# Patient Record
Sex: MALE | Race: WHITE | Employment: OTHER | URBAN - NONMETROPOLITAN AREA
[De-identification: names, ages, dates, MRNs, and addresses within clinical notes are randomized per-mention and may not be internally consistent; named-entity substitution may affect disease eponyms.]

---

## 2017-04-03 ENCOUNTER — OFFICE VISIT - GICH (OUTPATIENT)
Dept: FAMILY MEDICINE | Facility: OTHER | Age: 67
End: 2017-04-03

## 2017-04-03 ENCOUNTER — HISTORY (OUTPATIENT)
Dept: FAMILY MEDICINE | Facility: OTHER | Age: 67
End: 2017-04-03

## 2017-04-03 DIAGNOSIS — E78.2 MIXED HYPERLIPIDEMIA: ICD-10-CM

## 2017-04-03 DIAGNOSIS — Z00.00 ENCOUNTER FOR GENERAL ADULT MEDICAL EXAMINATION WITHOUT ABNORMAL FINDINGS: ICD-10-CM

## 2017-04-03 DIAGNOSIS — Z86.0100 HISTORY OF COLONIC POLYPS: ICD-10-CM

## 2017-04-03 DIAGNOSIS — I10 ESSENTIAL (PRIMARY) HYPERTENSION: ICD-10-CM

## 2017-04-03 LAB
A/G RATIO - HISTORICAL: 1.5 (ref 1–2)
ALBUMIN SERPL-MCNC: 4.5 G/DL (ref 3.5–5.7)
ALP SERPL-CCNC: 71 IU/L (ref 34–104)
ALT (SGPT) - HISTORICAL: 18 IU/L (ref 7–52)
ANION GAP - HISTORICAL: 9 (ref 5–18)
AST SERPL-CCNC: 15 IU/L (ref 13–39)
BACTERIA URINE: NORMAL BACTERIA/HPF
BILIRUB SERPL-MCNC: 0.6 MG/DL (ref 0.3–1)
BILIRUB UR QL: NEGATIVE
BUN SERPL-MCNC: 11 MG/DL (ref 7–25)
BUN/CREAT RATIO - HISTORICAL: 13
CALCIUM SERPL-MCNC: 9.9 MG/DL (ref 8.6–10.3)
CHLORIDE SERPLBLD-SCNC: 98 MMOL/L (ref 98–107)
CHOL/HDL RATIO - HISTORICAL: 5.86
CHOLESTEROL TOTAL: 217 MG/DL
CLARITY, URINE: CLEAR CLARITY
CO2 SERPL-SCNC: 28 MMOL/L (ref 21–31)
COLOR UR: YELLOW COLOR
CREAT SERPL-MCNC: 0.84 MG/DL (ref 0.7–1.3)
EPITHELIAL CELLS: NORMAL EPI/HPF
GFR IF NOT AFRICAN AMERICAN - HISTORICAL: >60 ML/MIN/1.73M2
GLOBULIN - HISTORICAL: 3.1 G/DL (ref 2–3.7)
GLUCOSE SERPL-MCNC: 95 MG/DL (ref 70–105)
GLUCOSE URINE: NEGATIVE MG/DL
HDLC SERPL-MCNC: 37 MG/DL (ref 23–92)
HEMOGLOBIN: 16.1 G/DL (ref 13.5–17.5)
KETONES UR QL: NEGATIVE MG/DL
LDLC SERPL CALC-MCNC: 135 MG/DL
LEUKOCYTE ESTERASE URINE: NEGATIVE
MCV RBC AUTO: 95 FL (ref 80–100)
NITRITE UR QL STRIP: NEGATIVE
NON-HDL CHOLESTEROL - HISTORICAL: 180 MG/DL
OCCULT BLOOD,URINE - HISTORICAL: ABNORMAL
PATIENT STATUS - HISTORICAL: ABNORMAL
PH UR: 5.5 [PH]
POTASSIUM SERPL-SCNC: 3.8 MMOL/L (ref 3.5–5.1)
PROT SERPL-MCNC: 7.6 G/DL (ref 6.4–8.9)
PROTEIN QUALITATIVE,URINE - HISTORICAL: NEGATIVE MG/DL
RBC - HISTORICAL: NORMAL /HPF
SODIUM SERPL-SCNC: 135 MMOL/L (ref 133–143)
SP GR UR STRIP: 1.01
TRIGL SERPL-MCNC: 223 MG/DL
UROBILINOGEN,QUALITATIVE - HISTORICAL: NORMAL EU/DL
WBC - HISTORICAL: NORMAL /HPF

## 2017-04-06 ENCOUNTER — AMBULATORY - GICH (OUTPATIENT)
Dept: FAMILY MEDICINE | Facility: OTHER | Age: 67
End: 2017-04-06

## 2018-01-04 NOTE — PROGRESS NOTES
Patient Information     Patient Name MRN Sex Nina Bone [id number] Male 1950      Progress Notes signed by Nitza Pisano MD at 2017  2:46 PM      Author:  Nitza Pisano MD Service:  (none) Author Type:  Physician     Filed:  2017  2:46 PM Encounter Date:  2017 Status:  Signed     :  Nitza Pisano MD (Physician)            -  2017        [place] [address]      RE:  NINA LABOY  MR#:  [id number]  :  1950    Dear Mr. Laboy:    The results of your lab work are enclosed.     As you can see, your blood profile is entirely normal.     Your cholesterol has gone up a bit since last year when it was 190. This is still acceptable, although it would be a good idea to work on diet and exercise.     We checked your hemoglobin and your urine, both of which were normal.     I would recommend trying to cut back a bit on cholesterol foods, such as eggs and red meat.     I would also encourage you to try to work on quitting smoking, and cut back a bit on the alcohol use.     Overall, this lab work looks just fine, and we should check it again in one year.     Sincerely,      NITZA PISANO MD    WR/hml  Voice Job ID: [id number]  Text Job ID:  [id number]    ENC A copy of comprehensive metabolic panel and lipid panel done 2017.     cc:  2017  RE:  NINA LABOY  MR#:  [id number]  Page 1

## 2018-01-04 NOTE — NURSING NOTE
Patient Information     Patient Name MRN Babar Sloan [id number] Male 1950      Nursing Note by Fany Pelletier at 4/3/2017  2:45 PM     Author:  Fany Pelletier Service:  (none) Author Type:  (none)     Filed:  4/3/2017  3:40 PM Encounter Date:  4/3/2017 Status:  Signed     :  Fany Pelletier            Patient presents to the clinic today for a px.  Fayn Pelletier ....................  4/3/2017   2:51 PM

## 2018-01-04 NOTE — PROGRESS NOTES
Patient Information     Patient Name MRN Babar Sloan [id number] Male 1950      Progress Notes by Chuck Howard MD at 4/3/2017  2:45 PM     Author:  Chuck Howard MD Service:  (none) Author Type:  Physician     Filed:  4/3/2017  5:08 PM Encounter Date:  4/3/2017 Status:  Signed     :  Chuck Howard MD (Physician)            HPI-In today for comprehensive evaluation. He's been feeling fine, and states that he's had a fairly good year. His medication remains the same. He's had no particular problems.    He continues to smoke at least a half pack per day sometimes a whole pack per day. He drinks on call on a daily basis, 4-5 beers. His wife had inform me that she felt that this is becoming a problem. He denies any particular problems.    He otherwise feels well.   Past Medical History:     Diagnosis  Date     ACNE ROSACEA 2011     External hemorrhoids     noted on colonoscopy      Healthcare maintenance      Heavy alcohol use     4-5 beers/day      HYPERTENSION      INGUINAL HERNIA, RIGHT 2011     Microscopic hematuria      Tobacco use      Past Surgical History:      Procedure  Laterality Date     Bilat cataracts       CARPAL TUNNEL RELEASE Right     right       Closed reduction of right forearm fracture       COLONOSCOPY DIAGNOSTIC  16    F/U        COLONOSCOPY SCREENING      F/U        HERNIA REPAIR Right 13    [facility] with mesh       Family History       Problem   Relation Age of Onset     Hypertension  Other      Multiple family members       Hypertension  Father      Heart Disease  Father      Social History        Substance Use Topics          Smoking status:   Current Every Day Smoker      Packs/day:  0.50      Years:  42.00      Types:  Cigarettes      Smokeless tobacco:   Never Used      Alcohol use   Yes      Comment: About 6 beers daily, more on the weekend.       No Known Allergies    Current Outpatient Prescriptions:       aspirin 81 mg tablet, Take 81 mg by mouth once daily., Disp: , Rfl:      atenolol (TENORMIN) 25 mg tablet, Take 1 tablet by mouth once daily., Disp: 90 tablet, Rfl: 4     hydroCHLOROthiazide (HCTZ) 25 mg tablet, Take 1 tablet by mouth once daily., Disp: 90 tablet, Rfl: 4  Medications have been reviewed by me and are current to the best of my knowledge and ability.      ROS:  See HPI:  General denies any recent weight change, fever, chills, or night sweats. Eyes, ENT, cardiopulmonary, GI, , rheumatologic, hematologic, skin, lymph, neurologic, psychiatric and endocrine are all negative.    Physical exam-he is a pleasant, cooperative,  66 y.o. male   in no apparent distress.  HEENT-within normal limits for age.  Neck-supple with no adenopathy, thyromegaly, or bruits.  Good carotid pulses.  Lungs-clear with good air movement.  No rales, rhonchi, or wheezes are heard  Heart-regular rhythm with no murmur or gallop appreciated.  Abdomen-soft and nontender with no organomegaly or masses, bowel sounds are normal.  No bruits are heard.  Back-no CVA or low back tenderness.  -normal penis and testicles.  No hernia.  No lesions are seen.  Rectal-normal sphincter tone. Empty rectal vault. Normal size prostate with no nodules, masses, or asymmetry and no tenderness.  Extremities-no cyanosis, clubbing, or edema.  Normal distal pulses.  Skin-no significant lesions or rashes are noted. He does have rosacea of the face which is fairly stable  Lymphatic-no abnormal nodes are noted   Neurologic-intact and nonfocal.  Psychiatric-alert and oriented.  Normal mood and affect.    Lab-pending  Imaging-none indicated    Assessment-preventive healthcare-normal exam. Immunizations and colonoscopy are both up-to-date.    Persistent smoking    Fairly heavy alcohol use-patient denies but wife states that this is a problem. Of note is that his wife, who was seen a few days ago, asked me not to say that she had said anything.    Hypertension with  good control    History of colon polyp    Mild hyperlipidemia    Plan-encouraged him to work on cutting down or quitting smoking along with alcohol use which is entirely beer drinking. Medications refilled for another year. Laboratory studies done, he'll be notified of the results. Colonoscopy will be due in 3 years. Follow-up at yearly intervals.    Chuck Howard MD ....................  4/3/2017   5:00 PM  HPI-In today for comprehensive evaluation.    Past Medical History:     Diagnosis  Date     ACNE ROSACEA 12/1/2011     External hemorrhoids 12/05    noted on colonoscopy      Healthcare maintenance      Heavy alcohol use     4-5 beers/day      HYPERTENSION      INGUINAL HERNIA, RIGHT 12/1/2011     Microscopic hematuria      Tobacco use      Past Surgical History:      Procedure  Laterality Date     Bilat cataracts       CARPAL TUNNEL RELEASE Right 2003    right       Closed reduction of right forearm fracture       COLONOSCOPY DIAGNOSTIC  4/25/16    F/U 2021       COLONOSCOPY SCREENING  12/05    F/U 2015       HERNIA REPAIR Right 1/29/13    [facility] with mesh       Family History       Problem   Relation Age of Onset     Hypertension  Other      Multiple family members       Hypertension  Father      Heart Disease  Father      Social History        Substance Use Topics          Smoking status:   Current Every Day Smoker      Packs/day:  0.50      Years:  42.00      Types:  Cigarettes      Smokeless tobacco:   Never Used      Alcohol use   Yes      Comment: About 6 beers daily, more on the weekend.

## 2018-01-27 VITALS
DIASTOLIC BLOOD PRESSURE: 86 MMHG | HEIGHT: 69 IN | BODY MASS INDEX: 27.25 KG/M2 | SYSTOLIC BLOOD PRESSURE: 136 MMHG | WEIGHT: 184 LBS | HEART RATE: 60 BPM

## 2018-02-24 ENCOUNTER — DOCUMENTATION ONLY (OUTPATIENT)
Dept: FAMILY MEDICINE | Facility: OTHER | Age: 68
End: 2018-02-24

## 2018-02-24 PROBLEM — I10 HYPERTENSION: Status: ACTIVE | Noted: 2018-02-24

## 2018-02-24 RX ORDER — ATENOLOL 25 MG/1
25 TABLET ORAL DAILY
COMMUNITY
Start: 2017-04-03 | End: 2018-04-16

## 2018-02-24 RX ORDER — HYDROCHLOROTHIAZIDE 25 MG/1
25 TABLET ORAL DAILY
COMMUNITY
Start: 2017-04-03 | End: 2018-05-15

## 2018-04-16 DIAGNOSIS — I10 BENIGN ESSENTIAL HYPERTENSION: Primary | ICD-10-CM

## 2018-04-20 RX ORDER — ATENOLOL 25 MG/1
TABLET ORAL
Qty: 90 TABLET | Refills: 0 | Status: SHIPPED | OUTPATIENT
Start: 2018-04-20 | End: 2018-05-15

## 2018-04-20 NOTE — TELEPHONE ENCOUNTER
[facility] pharmacy and pt request a Rx refill for atenolol.  Beta Blockers protocol failed due to no blood pressure under 140/90 in past 12 months and no recent (12 mo) or future (30 days) visit within the authorizing provider's speciality.  Pt's last exam with PCP Dr. CHAKA Howard was on 4-3-17, yet has a scheduled appt with PCP on 5-15-18.  Will route to PCP for review and consideration of refill.  Unable to complete prescription refill per RN Medication Refill Policy.................... Cristiane Arvizu ....................  4/20/2018   10:30 AM

## 2018-05-15 ENCOUNTER — OFFICE VISIT (OUTPATIENT)
Dept: FAMILY MEDICINE | Facility: OTHER | Age: 68
End: 2018-05-15
Attending: FAMILY MEDICINE
Payer: MEDICARE

## 2018-05-15 ENCOUNTER — HOSPITAL ENCOUNTER (OUTPATIENT)
Dept: GENERAL RADIOLOGY | Facility: OTHER | Age: 68
Discharge: HOME | End: 2018-05-15
Attending: FAMILY MEDICINE | Admitting: FAMILY MEDICINE
Payer: MEDICARE

## 2018-05-15 VITALS
HEART RATE: 76 BPM | HEIGHT: 69 IN | WEIGHT: 177.4 LBS | DIASTOLIC BLOOD PRESSURE: 98 MMHG | BODY MASS INDEX: 26.28 KG/M2 | SYSTOLIC BLOOD PRESSURE: 136 MMHG

## 2018-05-15 DIAGNOSIS — G89.29 CHRONIC PAIN OF LEFT KNEE: ICD-10-CM

## 2018-05-15 DIAGNOSIS — M25.562 CHRONIC PAIN OF LEFT KNEE: ICD-10-CM

## 2018-05-15 DIAGNOSIS — Z13.6 ENCOUNTER FOR ABDOMINAL AORTIC ANEURYSM (AAA) SCREENING: ICD-10-CM

## 2018-05-15 DIAGNOSIS — I10 BENIGN ESSENTIAL HYPERTENSION: Primary | ICD-10-CM

## 2018-05-15 LAB
ALBUMIN SERPL-MCNC: 4.8 G/DL (ref 3.5–5.7)
ALBUMIN UR-MCNC: NEGATIVE MG/DL
ALP SERPL-CCNC: 81 U/L (ref 34–104)
ALT SERPL W P-5'-P-CCNC: 22 U/L (ref 7–52)
ANION GAP SERPL CALCULATED.3IONS-SCNC: 9 MMOL/L (ref 3–14)
APPEARANCE UR: CLEAR
AST SERPL W P-5'-P-CCNC: 19 U/L (ref 13–39)
BASOPHILS # BLD AUTO: 0.1 10E9/L (ref 0–0.2)
BASOPHILS NFR BLD AUTO: 0.9 %
BILIRUB SERPL-MCNC: 0.6 MG/DL (ref 0.3–1)
BILIRUB UR QL STRIP: NEGATIVE
BUN SERPL-MCNC: 18 MG/DL (ref 7–25)
CALCIUM SERPL-MCNC: 10.3 MG/DL (ref 8.6–10.3)
CHLORIDE SERPL-SCNC: 99 MMOL/L (ref 98–107)
CHOLEST SERPL-MCNC: 193 MG/DL
CO2 SERPL-SCNC: 27 MMOL/L (ref 21–31)
COLOR UR AUTO: YELLOW
CREAT SERPL-MCNC: 0.86 MG/DL (ref 0.7–1.3)
DIFFERENTIAL METHOD BLD: NORMAL
EOSINOPHIL # BLD AUTO: 0.2 10E9/L (ref 0–0.7)
EOSINOPHIL NFR BLD AUTO: 2.5 %
ERYTHROCYTE [DISTWIDTH] IN BLOOD BY AUTOMATED COUNT: 13.1 % (ref 10–15)
GFR SERPL CREATININE-BSD FRML MDRD: 89 ML/MIN/1.7M2
GLUCOSE SERPL-MCNC: 95 MG/DL (ref 70–105)
GLUCOSE UR STRIP-MCNC: NEGATIVE MG/DL
HCT VFR BLD AUTO: 43.7 % (ref 40–53)
HDLC SERPL-MCNC: 36 MG/DL (ref 23–92)
HGB BLD-MCNC: 15.5 G/DL (ref 13.3–17.7)
HGB UR QL STRIP: ABNORMAL
IMM GRANULOCYTES # BLD: 0 10E9/L (ref 0–0.4)
IMM GRANULOCYTES NFR BLD: 0.6 %
KETONES UR STRIP-MCNC: NEGATIVE MG/DL
LDLC SERPL CALC-MCNC: 120 MG/DL
LEUKOCYTE ESTERASE UR QL STRIP: NEGATIVE
LYMPHOCYTES # BLD AUTO: 2.2 10E9/L (ref 0.8–5.3)
LYMPHOCYTES NFR BLD AUTO: 32.2 %
MCH RBC QN AUTO: 31.8 PG (ref 26.5–33)
MCHC RBC AUTO-ENTMCNC: 35.5 G/DL (ref 31.5–36.5)
MCV RBC AUTO: 90 FL (ref 78–100)
MONOCYTES # BLD AUTO: 0.7 10E9/L (ref 0–1.3)
MONOCYTES NFR BLD AUTO: 10.1 %
NEUTROPHILS # BLD AUTO: 3.6 10E9/L (ref 1.6–8.3)
NEUTROPHILS NFR BLD AUTO: 53.7 %
NITRATE UR QL: NEGATIVE
NONHDLC SERPL-MCNC: 157 MG/DL
PH UR STRIP: 5 PH (ref 5–7)
PLATELET # BLD AUTO: 301 10E9/L (ref 150–450)
POTASSIUM SERPL-SCNC: 4.2 MMOL/L (ref 3.5–5.1)
PROT SERPL-MCNC: 7.9 G/DL (ref 6.4–8.9)
RBC # BLD AUTO: 4.88 10E12/L (ref 4.4–5.9)
RBC #/AREA URNS AUTO: NORMAL /HPF
SODIUM SERPL-SCNC: 135 MMOL/L (ref 134–144)
SOURCE: ABNORMAL
SP GR UR STRIP: 1.02 (ref 1–1.03)
TRIGL SERPL-MCNC: 187 MG/DL
UROBILINOGEN UR STRIP-ACNC: 0.2 EU/DL (ref 0.2–1)
WBC # BLD AUTO: 6.7 10E9/L (ref 4–11)
WBC #/AREA URNS AUTO: NORMAL /HPF

## 2018-05-15 PROCEDURE — 85025 COMPLETE CBC W/AUTO DIFF WBC: CPT | Performed by: FAMILY MEDICINE

## 2018-05-15 PROCEDURE — G0463 HOSPITAL OUTPT CLINIC VISIT: HCPCS

## 2018-05-15 PROCEDURE — 99214 OFFICE O/P EST MOD 30 MIN: CPT | Performed by: FAMILY MEDICINE

## 2018-05-15 PROCEDURE — 81001 URINALYSIS AUTO W/SCOPE: CPT | Performed by: FAMILY MEDICINE

## 2018-05-15 PROCEDURE — 80053 COMPREHEN METABOLIC PANEL: CPT | Performed by: FAMILY MEDICINE

## 2018-05-15 PROCEDURE — 36415 COLL VENOUS BLD VENIPUNCTURE: CPT | Performed by: FAMILY MEDICINE

## 2018-05-15 PROCEDURE — 80061 LIPID PANEL: CPT | Performed by: FAMILY MEDICINE

## 2018-05-15 RX ORDER — ATENOLOL 50 MG/1
50 TABLET ORAL DAILY
Qty: 90 TABLET | Refills: 3 | Status: SHIPPED | OUTPATIENT
Start: 2018-05-15 | End: 2018-09-06

## 2018-05-15 RX ORDER — HYDROCHLOROTHIAZIDE 25 MG/1
25 TABLET ORAL DAILY
Qty: 90 TABLET | Refills: 3 | Status: SHIPPED | OUTPATIENT
Start: 2018-05-15 | End: 2019-06-17

## 2018-05-15 ASSESSMENT — PAIN SCALES - GENERAL: PAINLEVEL: MODERATE PAIN (4)

## 2018-05-15 NOTE — PROGRESS NOTES
HPI-In today for comprehensive evaluation.  Overall, he is been feeling fine.  The only thing is bothering him is his left knee.  He is noted for several months, in fact perhaps over a year, that he has had pain in the left knee fairly diffusely over the anterior aspect.  There is been no injury at all recently and he does not recall any remote injury.  The pain stays localized in the area of the knee without radiation.  He is been taking just 1 200 mg ibuprofen daily and this seems to help and he is noted that since the weather got nicer out the pain is improved.  It bothers him mostly with walking and not at rest.  Otherwise he is feeling fine with no new problems or concerns.    He continues to smoke.  He is been a smoker since high school and has roughly a 35-pack-year history.  He is down to about a half pack per day now.    Medication remains the same.    Past Medical History:   Diagnosis Date     Encounter for general adult medical examination without abnormal findings     No Comments Provided     Essential (primary) hypertension     No Comments Provided     Other microscopic hematuria     No Comments Provided     Other problems related to lifestyle     4-5 beers/day     Residual hemorrhoidal skin tags     12/05,noted on colonoscopy     Rosacea     12/1/2011     Tobacco use     No Comments Provided     Unilateral inguinal hernia without obstruction or gangrene     12/1/2011     Past Surgical History:   Procedure Laterality Date     COLONOSCOPY      12/05,F/U 2015     COLONOSCOPY      4/25/16,F/U 2021     OTHER SURGICAL HISTORY      [id number],OTHER     OTHER SURGICAL HISTORY      [id number],OTHER     OTHER SURGICAL HISTORY      1/29/13,,HERNIA REPAIR,Right,[facility] with mesh     RELEASE CARPAL TUNNEL      2003,right     Family History   Problem Relation Age of Onset     Hypertension Father      Hypertension     HEART DISEASE Father      Heart Disease     Hypertension Other      Hypertension,Multiple family members      Social History   Substance Use Topics     Smoking status: Current Every Day Smoker     Packs/day: 0.50     Years: 42.00     Types: Cigarettes     Smokeless tobacco: Never Used     Alcohol use Yes      Comment: Alcoholic Drinks/day: About 6 beers daily, more on the weekend.     No Known Allergies    Current Outpatient Prescriptions:      aspirin 81 MG tablet, Take 81 mg by mouth daily, Disp: , Rfl:      atenolol (TENORMIN) 50 MG tablet, Take 1 tablet (50 mg) by mouth daily Dose increase, Disp: 90 tablet, Rfl: 3     hydrochlorothiazide (HYDRODIURIL) 25 MG tablet, Take 1 tablet (25 mg) by mouth daily, Disp: 90 tablet, Rfl: 3     [DISCONTINUED] atenolol (TENORMIN) 25 MG tablet, TAKE 1 TABLET BY MOUTH ONCE DAILY, Disp: 90 tablet, Rfl: 0     [DISCONTINUED] hydrochlorothiazide (HYDRODIURIL) 25 MG tablet, Take 25 mg by mouth daily, Disp: , Rfl:     ROS:  See HPI: No recent weight change, fever, chills, night sweats.  Eyes, ENT, cardiopulmonary, GI, , rheumatologic, hematologic, skin, lymph, neurologic, psychiatric and endocrine are all negative other than was noted in the HPI and his rosacea which he says is quite stable.    Physical exam-he is a pleasant, cooperative,  67 year old male   in noapparent distress.  HEENT-within normal limits for age.  He has diffuse rosacea changes on the face  Neck-supple with no adenopathy, thyromegaly, or bruits.  Good carotid pulses.  Lungs-clear with good air movement.  No rales, rhonchi, or wheezes are heard  Heart-regular rhythm with no murmur or gallop appreciated.  Abdomen-soft and nontender with no organomegaly or masses, bowel sounds are normal.  No bruits are heard.  Back-no CVA or low back tenderness.  -normal penis and testicles.  No hernia.  No lesions are seen.  Rectal-normal sphincter tone.  There are few small external hemorrhoids that are not inflamed.  Rectal vault is empty.  Prostate is normal size with no nodules, tenderness, or asymmetry.  Extremities-no  cyanosis, clubbing, or edema.  Normal distal pulses.  Skin-no significant lesions or rashes are noted.  Lymphatic-noabnormal nodes are noted   Neurologic-intact and nonfocal.  Psychiatric-alert and oriented.  Normal mood and affect.    Lab-pending  Imaging-the x-rays were done and personally reviewed by me and reviewed with the patient.  He does have some very mild medial joint line narrowing, but no real specific findings otherwise.    Assessment-preventive healthcare-normal exam.  Immunizations are all up-to-date.  Colonoscopy is due in 2021 for follow-up of a tubular adenoma.  With his smoking history screening aortic ultrasound is recommended and will be scheduled.    Hypertension with borderline adequate control.  Repeat blood pressure by me was the same.  His pulse rate is up over 70s I recommended increasing atenolol to 50 mg daily, continue same hydrochlorothiazide, and follow-up for blood pressure recheck in 2-3 months.  Laboratory studies will be done and he will be notified of the results.    Knee pain-this is improving and appears to be likely related to tendinitis.  Suggested he just continue with exercising, use heat and ice when needed, ibuprofen, and reevaluate if symptoms worsen.    Plan-med changes as noted above.  Ultrasound scheduled.  Otherwise if all goes well follow-up in 2-3 months for blood pressure and then at yearly intervals for a general exam.    NITZA PISANO....................  5/15/2018   2:03 PM

## 2018-05-15 NOTE — MR AVS SNAPSHOT
"              After Visit Summary   5/15/2018    Babar Laboy    MRN: [id number]           Patient Information     Date Of Birth          1950        Visit Information        Provider Department      5/15/2018 12:45 PM Chuck Howard MD [facility]        Today's Diagnoses     Benign essential hypertension    -  1    Chronic pain of left knee        Encounter for abdominal aortic aneurysm (AAA) screening           Follow-ups after your visit        Future tests that were ordered for you today     Open Future Orders        Priority Expected Expires Ordered    US Aorta Medicare AAA Screening Routine  5/15/2019 5/15/2018    XR Knee Standing 2v  Bilateral & 2v Left Routine 5/15/2018 5/15/2019 5/15/2018            Who to contact     If you have questions or need follow up information about today's clinic visit or your schedule please contact [facility] directly at [phone].  Normal or non-critical lab and imaging results will be communicated to you by [employer], letter or phone within 4 business days after the clinic has received the results. If you do not hear from us within 7 days, please contact the clinic through [employer] or phone. If you have a critical or abnormal lab result, we will notify you by phone as soon as possible.  Submit refill requests through [employer] or call your pharmacy and they will forward the refill request to us. Please allow 3 business days for your refill to be completed.          Additional Information About Your Visit        MyChart Information     [employer] lets you send messages to your doctor, view your test results, renew your prescriptions, schedule appointments and more. To sign up, go to [email or web address] . Click on \"Log in\" on the left side of the screen, which will take you to the Welcome page. Then click on \"Sign up Now\" on the right side of the page.     You will be asked to enter the access code listed below, as well " "as some personal information. Please follow the directions to create your username and password.     Your access code is: [email or web address]  Expires: 2018  2:09 PM     Your access code will  in 90 days. If you need help or a new code, please call your [place] clinic or [phone].        Care EveryWhere ID     This is your Care EveryWhere ID. This could be used by other organizations to access your [place] medical records  [id number]        Your Vitals Were     Pulse Height BMI (Body Mass Index)             76 5' 9\" (1.753 m) 26.2 kg/m2          Blood Pressure from Last 3 Encounters:   05/15/18 (!) 136/98   17 136/86   16 130/82    Weight from Last 3 Encounters:   05/15/18 177 lb 6.4 oz (80.5 kg)   17 184 lb (83.5 kg)   16 185 lb 12.8 oz (84.3 kg)              We Performed the Following     *UA reflex to Microscopic     CBC with platelets differential     Comprehensive metabolic panel     Lipid Profile          Today's Medication Changes          These changes are accurate as of 5/15/18  2:09 PM.  If you have any questions, ask your nurse or doctor.               These medicines have changed or have updated prescriptions.        Dose/Directions    atenolol 50 MG tablet   Commonly known as:  TENORMIN   This may have changed:  See the new instructions.   Used for:  Benign essential hypertension   Changed by:  Chuck Howard MD        Dose:  50 mg   Take 1 tablet (50 mg) by mouth daily Dose increase   Quantity:  90 tablet   Refills:  3            Where to get your medicines      These medications were sent to [facility] - [place], [place] - [address]  [address], [facility] [address]     Phone:  [phone]     atenolol 50 MG tablet    hydrochlorothiazide 25 MG tablet                Primary Care Provider Office Phone # Fax #    Chuck Howard [phone]       [address] [facility] [address]      "   Equal Access to Services     [facility] : Hadii aad ku hadsilviojosefina Kelseykhoa, wabernardinoda luqadaha, qaybta jesigraciethompson mccall. So [facility] [phone].    ATENCIÓN: Si habla español, tiene a pena disposición servicios gratuitos de asistencia lingüística. Llame al [phone].    We comply with applicable federal civil rights laws and [place] laws. We do not discriminate on the basis of race, color, national origin, age, disability, sex, sexual orientation, or gender identity.            Thank you!     Thank you for choosing [facility]  for your care. Our goal is always to provide you with excellent care. Hearing back from our patients is one way we can continue to improve our services. Please take a few minutes to complete the written survey that you may receive in the mail after your visit with us. Thank you!             Your Updated Medication List - Protect others around you: Learn how to safely use, store and throw away your medicines at www.disposemymeds.org.          This list is accurate as of 5/15/18  2:09 PM.  Always use your most recent med list.                   Brand Name Dispense Instructions for use Diagnosis    aspirin 81 MG tablet      Take 81 mg by mouth daily        atenolol 50 MG tablet    TENORMIN    90 tablet    Take 1 tablet (50 mg) by mouth daily Dose increase    Benign essential hypertension       hydrochlorothiazide 25 MG tablet    HYDRODIURIL    90 tablet    Take 1 tablet (25 mg) by mouth daily    Benign essential hypertension

## 2018-05-15 NOTE — LETTER
May 15, 2018      Babar Laboy  [address]   [facility] [address]        Dear ,    I am writing to inform you of your test results.    Your lab work all looks just fine.  It is essentially normal.  Your cholesterol has come down nicely from last year, and the slightly elevated triglycerides are not a problem.    Lab work should just be repeated again in 1 year.    As we discussed, we should see her back in August or September for blood pressure recheck.    Resulted Orders   Comprehensive metabolic panel   Result Value Ref Range    Sodium 135 134 - 144 mmol/L    Potassium 4.2 3.5 - 5.1 mmol/L    Chloride 99 98 - 107 mmol/L    Carbon Dioxide 27 21 - 31 mmol/L    Anion Gap 9 3 - 14 mmol/L    Glucose 95 70 - 105 mg/dL    Urea Nitrogen 18 7 - 25 mg/dL    Creatinine 0.86 0.70 - 1.30 mg/dL    GFR Estimate 89 >60 mL/min/1.7m2    GFR Estimate If Black >90 >60 mL/min/1.7m2    Calcium 10.3 8.6 - 10.3 mg/dL    Bilirubin Total 0.6 0.3 - 1.0 mg/dL    Albumin 4.8 3.5 - 5.7 g/dL    Protein Total 7.9 6.4 - 8.9 g/dL    Alkaline Phosphatase 81 34 - 104 U/L    ALT 22 7 - 52 U/L    AST 19 13 - 39 U/L   CBC with platelets differential   Result Value Ref Range    WBC 6.7 4.0 - 11.0 10e9/L    RBC Count 4.88 4.4 - 5.9 10e12/L    Hemoglobin 15.5 13.3 - 17.7 g/dL    Hematocrit 43.7 40.0 - 53.0 %    MCV 90 78 - 100 fl    MCH 31.8 26.5 - 33.0 pg    MCHC 35.5 31.5 - 36.5 g/dL    RDW 13.1 10.0 - 15.0 %    Platelet Count 301 150 - 450 10e9/L    Diff Method Automated Method     % Neutrophils 53.7 %    % Lymphocytes 32.2 %    % Monocytes 10.1 %    % Eosinophils 2.5 %    % Basophils 0.9 %    % Immature Granulocytes 0.6 %    Absolute Neutrophil 3.6 1.6 - 8.3 10e9/L    Absolute Lymphocytes 2.2 0.8 - 5.3 10e9/L    Absolute Monocytes 0.7 0.0 - 1.3 10e9/L    Absolute Eosinophils 0.2 0.0 - 0.7 10e9/L    Absolute Basophils 0.1 0.0 - 0.2 10e9/L    Abs Immature Granulocytes 0.0 0 - 0.4 10e9/L   Lipid Profile   Result Value Ref Range    Cholesterol  193 <200 mg/dL    Triglycerides 187 (H) <150 mg/dL      Comment:      Borderline high:  150-199 mg/dl  High:             200-499 mg/dl  Very high:       >499 mg/dl      HDL Cholesterol 36 23 - 92 mg/dL    LDL Cholesterol Calculated 120 (H) <100 mg/dL      Comment:      Above desirable:  100-129 mg/dl  Borderline High:  130-159 mg/dL  High:             160-189 mg/dL  Very high:       >189 mg/dl      Non HDL Cholesterol 157 (H) <130 mg/dL      Comment:      Above Desirable:  130-159 mg/dl  Borderline high:  160-189 mg/dl  High:             190-219 mg/dl  Very high:       >219 mg/dl     *UA reflex to Microscopic   Result Value Ref Range    Color Urine Yellow     Appearance Urine Clear     Glucose Urine Negative NEG^Negative mg/dL    Bilirubin Urine Negative NEG^Negative    Ketones Urine Negative NEG^Negative mg/dL    Specific Gravity Urine 1.025 1.003 - 1.035    Blood Urine Trace (A) NEG^Negative    pH Urine 5.0 5.0 - 7.0 pH    Protein Albumin Urine Negative NEG^Negative mg/dL    Urobilinogen Urine 0.2 0.2 - 1.0 EU/dL    Nitrite Urine Negative NEG^Negative    Leukocyte Esterase Urine Negative NEG^Negative    Source Unspecified Urine    Urine Microscopic   Result Value Ref Range    WBC Urine 0 - 5 OTO5^0 - 5 /HPF    RBC Urine O - 2 OTO2^O - 2 /HPF       If you have any questions or concerns, please call the clinic at the number listed above.       Sincerely,        NITZA PISANO MD

## 2018-05-15 NOTE — NURSING NOTE
Patient presents today for annual physical. Patient complains of left knee pain that he would like looked at.    Gianna Crain LPN on 5/15/2018 at 12:52 PM

## 2018-05-17 ENCOUNTER — HOSPITAL ENCOUNTER (OUTPATIENT)
Dept: ULTRASOUND IMAGING | Facility: OTHER | Age: 68
Discharge: HOME | End: 2018-05-17
Attending: FAMILY MEDICINE | Admitting: FAMILY MEDICINE
Payer: MEDICARE

## 2018-05-17 DIAGNOSIS — Z13.6 ENCOUNTER FOR ABDOMINAL AORTIC ANEURYSM (AAA) SCREENING: ICD-10-CM

## 2018-05-17 PROCEDURE — 76706 US ABDL AORTA SCREEN AAA: CPT

## 2018-09-04 ENCOUNTER — TELEPHONE (OUTPATIENT)
Dept: FAMILY MEDICINE | Facility: OTHER | Age: 68
End: 2018-09-04

## 2018-09-04 NOTE — TELEPHONE ENCOUNTER
WLR-Pt needs med chk appt. I am unable to schedule in a timely manner. Please call. Thank you.  Vinita Alcaraz

## 2018-09-06 ENCOUNTER — OFFICE VISIT (OUTPATIENT)
Dept: FAMILY MEDICINE | Facility: OTHER | Age: 68
End: 2018-09-06
Attending: FAMILY MEDICINE
Payer: COMMERCIAL

## 2018-09-06 VITALS
BODY MASS INDEX: 25.64 KG/M2 | HEART RATE: 88 BPM | WEIGHT: 173.6 LBS | DIASTOLIC BLOOD PRESSURE: 86 MMHG | SYSTOLIC BLOOD PRESSURE: 134 MMHG | RESPIRATION RATE: 16 BRPM

## 2018-09-06 DIAGNOSIS — I10 BENIGN ESSENTIAL HYPERTENSION: ICD-10-CM

## 2018-09-06 PROCEDURE — G0463 HOSPITAL OUTPT CLINIC VISIT: HCPCS

## 2018-09-06 PROCEDURE — 99213 OFFICE O/P EST LOW 20 MIN: CPT | Performed by: FAMILY MEDICINE

## 2018-09-06 RX ORDER — ATENOLOL 50 MG/1
50 TABLET ORAL DAILY
Qty: 90 TABLET | Refills: 3 | Status: SHIPPED | OUTPATIENT
Start: 2018-09-06 | End: 2019-07-01

## 2018-09-06 ASSESSMENT — PAIN SCALES - GENERAL: PAINLEVEL: NO PAIN (0)

## 2018-09-06 NOTE — PROGRESS NOTES
SUBJECTIVE:  68 year old male who presents for blood pressure follow-up.  He tolerated the increase in medication well.  No new problems or concerns.  Denies any cardiopulmonary, neurologic, or any other symptoms.  He did have a gastroenteritis a couple of weeks ago but is resolved.  His only symptom was diarrhea.  No vomiting or abdominal pain.  No fever or chills.    Additional Review of Systems: See HPI: No new symptoms otherwise    Past Medical History:   Diagnosis Date     Encounter for general adult medical examination without abnormal findings     No Comments Provided     Essential (primary) hypertension     No Comments Provided     Other microscopic hematuria     No Comments Provided     Other problems related to lifestyle     4-5 beers/day     Residual hemorrhoidal skin tags     12/05,noted on colonoscopy     Rosacea     12/1/2011     Tobacco use     No Comments Provided     Unilateral inguinal hernia without obstruction or gangrene     12/1/2011        Current Outpatient Prescriptions   Medication Sig Dispense Refill     aspirin 81 MG tablet Take 81 mg by mouth daily       atenolol (TENORMIN) 50 MG tablet Take 1 tablet (50 mg) by mouth daily Dose increase 90 tablet 3     hydrochlorothiazide (HYDRODIURIL) 25 MG tablet Take 1 tablet (25 mg) by mouth daily 90 tablet 3     [DISCONTINUED] atenolol (TENORMIN) 50 MG tablet Take 1 tablet (50 mg) by mouth daily Dose increase 90 tablet 3       Allergies as of 09/06/2018     (No Known Allergies)        OBJECTIVE:  /86 (BP Location: Right arm, Patient Position: Sitting, Cuff Size: Adult Regular)  Pulse 88  Resp 16  Wt 173 lb 9.6 oz (78.7 kg)  BMI 25.64 kg/m2  EXAM: {EXAM -   General: He is a pleasant healthy-appearing man, cooperative, and in no apparent distress  HEENT/neck: Normal  Chest/cardiac: Lungs are clear.  Cardiac exam is normal.  Abdomen/: Soft and nontender  Blood pressure rechecked by me was 134/84    Labs/imaging: Previous labs are  reviewed    ASSESSMENT/PLAN:  Hypertension with good control.  Medications will remain the same, refilled for a year.  Recommended intermittent monitoring at home, and follow-up at yearly intervals.  NITZA PISANO MD on 9/6/2018 at 12:31 PM      Blood pressure follow-up.  He is feeling just fine.

## 2018-09-06 NOTE — NURSING NOTE
Babar presents to the clinic today for a blood pressure check and follow up.        Kuldeep Quiros LPN 09/06/18 10:09 AM

## 2018-09-06 NOTE — NURSING NOTE
"Chief Complaint   Patient presents with     Hypertension     Blood pressure check       Initial /86 (BP Location: Right arm, Patient Position: Sitting, Cuff Size: Adult Regular)  Pulse 88  Resp 16  Wt 173 lb 9.6 oz (78.7 kg)  BMI 25.64 kg/m2 Estimated body mass index is 25.64 kg/(m^2) as calculated from the following:    Height as of 5/15/18: 5' 9\" (1.753 m).    Weight as of this encounter: 173 lb 9.6 oz (78.7 kg).  Medication Reconciliation: complete    Kuldeep Quiros LPN    "

## 2018-09-06 NOTE — MR AVS SNAPSHOT
"              After Visit Summary   2018    Babar Laboy    MRN: [id number]           Patient Information     Date Of Birth          1950        Visit Information        Provider Department      2018 10:00 AM Chuck Howard MD [facility]        Today's Diagnoses     Benign essential hypertension           Follow-ups after your visit        Who to contact     If you have questions or need follow up information about today's clinic visit or your schedule please contact [facility] directly at [phone].  Normal or non-critical lab and imaging results will be communicated to you by [employer], letter or phone within 4 business days after the clinic has received the results. If you do not hear from us within 7 days, please contact the clinic through [employer] or phone. If you have a critical or abnormal lab result, we will notify you by phone as soon as possible.  Submit refill requests through [employer] or call your pharmacy and they will forward the refill request to us. Please allow 3 business days for your refill to be completed.          Additional Information About Your Visit        MyChart Information     [employer] lets you send messages to your doctor, view your test results, renew your prescriptions, schedule appointments and more. To sign up, go to [email or web address] . Click on \"Log in\" on the left side of the screen, which will take you to the Welcome page. Then click on \"Sign up Now\" on the right side of the page.     You will be asked to enter the access code listed below, as well as some personal information. Please follow the directions to create your username and password.     Your access code is: [email or web address]  Expires: 2018 12:31 PM     Your access code will  in 90 days. If you need help or a new code, please call your [place] clinic or [phone].        Care EveryWhere ID     This is your Care EveryWhere ID. This could be " used by other organizations to access your [facility] medical records  [id number]        Your Vitals Were     Pulse Respirations BMI (Body Mass Index)             88 16 25.64 kg/m2          Blood Pressure from Last 3 Encounters:   09/06/18 134/86   05/15/18 (!) 136/98   04/03/17 136/86    Weight from Last 3 Encounters:   09/06/18 173 lb 9.6 oz (78.7 kg)   05/15/18 177 lb 6.4 oz (80.5 kg)   04/03/17 184 lb (83.5 kg)              Today, you had the following     No orders found for display         Where to get your medicines      These medications were sent to [id number] - [place], [place] - [address]  [address], [facility] [address]     Phone:  [phone]     atenolol 50 MG tablet          Primary Care Provider Office Phone # Fax #    Chuck Howard [phone]       [address] [facility] [address]        Equal Access to Services     [facility]: Hadii aad ku hadasho Sobroderickali, waaxda luqadaha, qaybta kaalmada danilo, thompson treviño . So [facility] [phone].    ATENCIÓN: Si habla español, tiene a pena disposición servicios gratuitos de asistencia lingüística. Liliam al [phone].    We comply with applicable federal civil rights laws and [place] laws. We do not discriminate on the basis of race, color, national origin, age, disability, sex, sexual orientation, or gender identity.            Thank you!     Thank you for choosing [facility] AND [facility]  for your care. Our goal is always to provide you with excellent care. Hearing back from our patients is one way we can continue to improve our services. Please take a few minutes to complete the written survey that you may receive in the mail after your visit with us. Thank you!             Your Updated Medication List - Protect others around you: Learn how to safely use, store and throw away your medicines at www.disposemymeds.org.          This  list is accurate as of 9/6/18 12:31 PM.  Always use your most recent med list.                   Brand Name Dispense Instructions for use Diagnosis    aspirin 81 MG tablet      Take 81 mg by mouth daily        atenolol 50 MG tablet    TENORMIN    90 tablet    Take 1 tablet (50 mg) by mouth daily Dose increase    Benign essential hypertension       hydrochlorothiazide 25 MG tablet    HYDRODIURIL    90 tablet    Take 1 tablet (25 mg) by mouth daily    Benign essential hypertension

## 2019-01-01 ENCOUNTER — HOSPITAL ENCOUNTER (OUTPATIENT)
Dept: GENERAL RADIOLOGY | Facility: OTHER | Age: 69
End: 2019-12-23
Attending: SURGERY | Admitting: SURGERY
Payer: MEDICARE

## 2019-01-01 ENCOUNTER — NURSE TRIAGE (OUTPATIENT)
Dept: PEDIATRICS | Facility: OTHER | Age: 69
End: 2019-01-01

## 2019-01-01 ENCOUNTER — HOSPITAL ENCOUNTER (OUTPATIENT)
Facility: OTHER | Age: 69
Discharge: HOME | End: 2019-12-23
Attending: SURGERY | Admitting: SURGERY
Payer: MEDICARE

## 2019-01-01 ENCOUNTER — ANESTHESIA (OUTPATIENT)
Dept: SURGERY | Facility: OTHER | Age: 69
End: 2019-01-01
Payer: MEDICARE

## 2019-01-01 ENCOUNTER — ONCOLOGY VISIT (OUTPATIENT)
Dept: ONCOLOGY | Facility: OTHER | Age: 69
End: 2019-01-01
Attending: NURSE PRACTITIONER
Payer: MEDICARE

## 2019-01-01 ENCOUNTER — HOSPITAL ENCOUNTER (OUTPATIENT)
Dept: INFUSION THERAPY | Facility: OTHER | Age: 69
Discharge: HOME | End: 2019-12-31
Attending: INTERNAL MEDICINE | Admitting: INTERNAL MEDICINE
Payer: MEDICARE

## 2019-01-01 ENCOUNTER — TELEPHONE (OUTPATIENT)
Dept: PEDIATRICS | Facility: OTHER | Age: 69
End: 2019-01-01

## 2019-01-01 ENCOUNTER — ONCOLOGY VISIT (OUTPATIENT)
Dept: ONCOLOGY | Facility: OTHER | Age: 69
End: 2019-01-01
Attending: INTERNAL MEDICINE
Payer: COMMERCIAL

## 2019-01-01 ENCOUNTER — APPOINTMENT (OUTPATIENT)
Dept: GENERAL RADIOLOGY | Facility: OTHER | Age: 69
End: 2019-01-01
Attending: EMERGENCY MEDICINE
Payer: MEDICARE

## 2019-01-01 ENCOUNTER — TRANSFERRED RECORDS (OUTPATIENT)
Dept: HEALTH INFORMATION MANAGEMENT | Facility: OTHER | Age: 69
End: 2019-01-01

## 2019-01-01 ENCOUNTER — THERAPY VISIT (OUTPATIENT)
Dept: SLEEP MEDICINE | Facility: OTHER | Age: 69
End: 2019-01-01
Attending: INTERNAL MEDICINE
Payer: MEDICARE

## 2019-01-01 ENCOUNTER — APPOINTMENT (OUTPATIENT)
Dept: CT IMAGING | Facility: OTHER | Age: 69
End: 2019-01-01
Attending: PHYSICIAN ASSISTANT
Payer: MEDICARE

## 2019-01-01 ENCOUNTER — HOSPITAL ENCOUNTER (OUTPATIENT)
Dept: INFUSION THERAPY | Facility: OTHER | Age: 69
Discharge: HOME | End: 2019-12-30
Attending: INTERNAL MEDICINE | Admitting: INTERNAL MEDICINE
Payer: MEDICARE

## 2019-01-01 ENCOUNTER — TELEPHONE (OUTPATIENT)
Dept: SURGERY | Facility: OTHER | Age: 69
End: 2019-01-01

## 2019-01-01 ENCOUNTER — APPOINTMENT (OUTPATIENT)
Dept: GENERAL RADIOLOGY | Facility: OTHER | Age: 69
End: 2019-01-01
Attending: SURGERY
Payer: MEDICARE

## 2019-01-01 ENCOUNTER — ANESTHESIA EVENT (OUTPATIENT)
Dept: SURGERY | Facility: OTHER | Age: 69
End: 2019-01-01
Payer: MEDICARE

## 2019-01-01 ENCOUNTER — HOSPITAL ENCOUNTER (OUTPATIENT)
Dept: MRI IMAGING | Facility: OTHER | Age: 69
End: 2019-12-31
Attending: NURSE PRACTITIONER
Payer: MEDICARE

## 2019-01-01 ENCOUNTER — HOSPITAL ENCOUNTER (OUTPATIENT)
Dept: PET IMAGING | Facility: OTHER | Age: 69
Discharge: HOME | End: 2019-12-27
Attending: INTERNAL MEDICINE | Admitting: INTERNAL MEDICINE
Payer: MEDICARE

## 2019-01-01 ENCOUNTER — ANESTHESIA EVENT (OUTPATIENT)
Dept: ANESTHESIOLOGY | Facility: OTHER | Age: 69
End: 2019-01-01

## 2019-01-01 ENCOUNTER — OFFICE VISIT (OUTPATIENT)
Dept: CARDIOLOGY | Facility: OTHER | Age: 69
End: 2019-01-01
Attending: INTERNAL MEDICINE
Payer: COMMERCIAL

## 2019-01-01 ENCOUNTER — HOSPITAL ENCOUNTER (OUTPATIENT)
Dept: MRI IMAGING | Facility: OTHER | Age: 69
Discharge: HOME | End: 2019-12-19
Attending: INTERNAL MEDICINE | Admitting: INTERNAL MEDICINE
Payer: MEDICARE

## 2019-01-01 ENCOUNTER — HOSPITAL ENCOUNTER (EMERGENCY)
Facility: OTHER | Age: 69
Discharge: HOME | End: 2019-12-03
Attending: PHYSICIAN ASSISTANT | Admitting: PHYSICIAN ASSISTANT
Payer: MEDICARE

## 2019-01-01 ENCOUNTER — ANESTHESIA (OUTPATIENT)
Dept: ANESTHESIOLOGY | Facility: OTHER | Age: 69
End: 2019-01-01

## 2019-01-01 ENCOUNTER — HOSPITAL ENCOUNTER (EMERGENCY)
Facility: OTHER | Age: 69
Discharge: HOME | End: 2019-12-06
Attending: EMERGENCY MEDICINE | Admitting: EMERGENCY MEDICINE
Payer: MEDICARE

## 2019-01-01 VITALS
HEIGHT: 69 IN | DIASTOLIC BLOOD PRESSURE: 88 MMHG | HEART RATE: 60 BPM | OXYGEN SATURATION: 99 % | BODY MASS INDEX: 27.7 KG/M2 | SYSTOLIC BLOOD PRESSURE: 124 MMHG | TEMPERATURE: 98.1 F | RESPIRATION RATE: 16 BRPM | WEIGHT: 187 LBS

## 2019-01-01 VITALS
RESPIRATION RATE: 16 BRPM | DIASTOLIC BLOOD PRESSURE: 85 MMHG | OXYGEN SATURATION: 95 % | TEMPERATURE: 97.3 F | SYSTOLIC BLOOD PRESSURE: 127 MMHG

## 2019-01-01 VITALS
SYSTOLIC BLOOD PRESSURE: 175 MMHG | DIASTOLIC BLOOD PRESSURE: 99 MMHG | WEIGHT: 175 LBS | OXYGEN SATURATION: 98 % | TEMPERATURE: 98.5 F | HEIGHT: 69 IN | RESPIRATION RATE: 12 BRPM | BODY MASS INDEX: 25.92 KG/M2 | HEART RATE: 58 BPM

## 2019-01-01 VITALS
OXYGEN SATURATION: 99 % | SYSTOLIC BLOOD PRESSURE: 150 MMHG | WEIGHT: 175 LBS | DIASTOLIC BLOOD PRESSURE: 80 MMHG | TEMPERATURE: 98 F | RESPIRATION RATE: 20 BRPM | BODY MASS INDEX: 25.92 KG/M2 | HEIGHT: 69 IN

## 2019-01-01 VITALS
DIASTOLIC BLOOD PRESSURE: 78 MMHG | BODY MASS INDEX: 27.25 KG/M2 | RESPIRATION RATE: 18 BRPM | OXYGEN SATURATION: 99 % | SYSTOLIC BLOOD PRESSURE: 110 MMHG | HEART RATE: 60 BPM | HEIGHT: 69 IN | WEIGHT: 184 LBS | TEMPERATURE: 96.9 F

## 2019-01-01 VITALS
WEIGHT: 180 LBS | SYSTOLIC BLOOD PRESSURE: 157 MMHG | DIASTOLIC BLOOD PRESSURE: 78 MMHG | RESPIRATION RATE: 16 BRPM | HEART RATE: 57 BPM | BODY MASS INDEX: 26.57 KG/M2 | TEMPERATURE: 96.3 F

## 2019-01-01 DIAGNOSIS — Z71.6 TOBACCO ABUSE COUNSELING: ICD-10-CM

## 2019-01-01 DIAGNOSIS — I10 HYPERTENSION: ICD-10-CM

## 2019-01-01 DIAGNOSIS — Z79.01 ON CONTINUOUS ORAL ANTICOAGULATION: ICD-10-CM

## 2019-01-01 DIAGNOSIS — E87.1 HYPONATREMIA: ICD-10-CM

## 2019-01-01 DIAGNOSIS — C34.90 MALIGNANT NEOPLASM OF LUNG, UNSPECIFIED LATERALITY, UNSPECIFIED PART OF LUNG (H): ICD-10-CM

## 2019-01-01 DIAGNOSIS — M54.9 BACK PAIN, UNSPECIFIED BACK LOCATION, UNSPECIFIED BACK PAIN LATERALITY, UNSPECIFIED CHRONICITY: ICD-10-CM

## 2019-01-01 DIAGNOSIS — R91.8 HILAR MASS: Primary | ICD-10-CM

## 2019-01-01 DIAGNOSIS — Z85.118 PERSONAL HISTORY OF MALIGNANT NEOPLASM OF LUNG: ICD-10-CM

## 2019-01-01 DIAGNOSIS — C34.90 MALIGNANT NEOPLASM OF LUNG, UNSPECIFIED LATERALITY, UNSPECIFIED PART OF LUNG (H): Primary | ICD-10-CM

## 2019-01-01 DIAGNOSIS — R04.2 HEMOPTYSIS: ICD-10-CM

## 2019-01-01 DIAGNOSIS — R91.8 LUNG MASS: ICD-10-CM

## 2019-01-01 DIAGNOSIS — I48.0 PAROXYSMAL ATRIAL FIBRILLATION (H): ICD-10-CM

## 2019-01-01 DIAGNOSIS — C78.00 MALIGNANT NEOPLASM METASTATIC TO LUNG, UNSPECIFIED LATERALITY (H): ICD-10-CM

## 2019-01-01 DIAGNOSIS — R06.83 HABITUAL SNORING: Primary | ICD-10-CM

## 2019-01-01 DIAGNOSIS — G47.33 OSA (OBSTRUCTIVE SLEEP APNEA): ICD-10-CM

## 2019-01-01 DIAGNOSIS — I71.21 ASCENDING AORTIC ANEURYSM (H): ICD-10-CM

## 2019-01-01 DIAGNOSIS — R94.39 ABNORMAL STRESS TEST: ICD-10-CM

## 2019-01-01 DIAGNOSIS — I48.91 ATRIAL FIBRILLATION (H): ICD-10-CM

## 2019-01-01 DIAGNOSIS — G89.3 CANCER ASSOCIATED PAIN: Primary | ICD-10-CM

## 2019-01-01 DIAGNOSIS — Z79.899 ANALGESIC USE: ICD-10-CM

## 2019-01-01 DIAGNOSIS — Z72.0 TOBACCO ABUSE: ICD-10-CM

## 2019-01-01 DIAGNOSIS — Z23 NEED FOR INFLUENZA VACCINATION: Primary | ICD-10-CM

## 2019-01-01 DIAGNOSIS — I48.91 NEW ONSET A-FIB (H): ICD-10-CM

## 2019-01-01 DIAGNOSIS — Z95.828 PORT-A-CATH IN PLACE: ICD-10-CM

## 2019-01-01 DIAGNOSIS — I10 BENIGN ESSENTIAL HYPERTENSION: Chronic | ICD-10-CM

## 2019-01-01 DIAGNOSIS — E78.2 MIXED HYPERLIPIDEMIA: ICD-10-CM

## 2019-01-01 DIAGNOSIS — I51.7 ASYMMETRIC SEPTAL HYPERTROPHY: ICD-10-CM

## 2019-01-01 LAB
ALBUMIN SERPL-MCNC: 4.1 G/DL (ref 3.5–5.7)
ALBUMIN SERPL-MCNC: 4.2 G/DL (ref 3.5–5.7)
ALBUMIN SERPL-MCNC: 4.3 G/DL (ref 3.5–5.7)
ALBUMIN SERPL-MCNC: 4.6 G/DL (ref 3.5–5.7)
ALBUMIN UR-MCNC: NEGATIVE MG/DL
ALP SERPL-CCNC: 78 U/L (ref 34–104)
ALP SERPL-CCNC: 90 U/L (ref 34–104)
ALP SERPL-CCNC: 93 U/L (ref 34–104)
ALP SERPL-CCNC: 97 U/L (ref 34–104)
ALT SERPL W P-5'-P-CCNC: 15 U/L (ref 7–52)
ALT SERPL W P-5'-P-CCNC: 15 U/L (ref 7–52)
ALT SERPL W P-5'-P-CCNC: 17 U/L (ref 7–52)
ALT SERPL W P-5'-P-CCNC: 18 U/L (ref 7–52)
ANION GAP SERPL CALCULATED.3IONS-SCNC: 4 MMOL/L (ref 3–14)
ANION GAP SERPL CALCULATED.3IONS-SCNC: 6 MMOL/L (ref 3–14)
ANION GAP SERPL CALCULATED.3IONS-SCNC: 7 MMOL/L (ref 3–14)
ANION GAP SERPL CALCULATED.3IONS-SCNC: 8 MMOL/L (ref 3–14)
APPEARANCE UR: CLEAR
APTT PPP: 37 SEC (ref 22–37)
AST SERPL W P-5'-P-CCNC: 14 U/L (ref 13–39)
AST SERPL W P-5'-P-CCNC: 15 U/L (ref 13–39)
AST SERPL W P-5'-P-CCNC: 16 U/L (ref 13–39)
AST SERPL W P-5'-P-CCNC: 18 U/L (ref 13–39)
BASOPHILS # BLD AUTO: 0 10E9/L (ref 0–0.2)
BASOPHILS # BLD AUTO: 0.1 10E9/L (ref 0–0.2)
BASOPHILS NFR BLD AUTO: 0.6 %
BASOPHILS NFR BLD AUTO: 0.6 %
BASOPHILS NFR BLD AUTO: 0.7 %
BASOPHILS NFR BLD AUTO: 0.8 %
BILIRUB SERPL-MCNC: 0.4 MG/DL (ref 0.3–1)
BILIRUB SERPL-MCNC: 0.5 MG/DL (ref 0.3–1)
BILIRUB SERPL-MCNC: 0.6 MG/DL (ref 0.3–1)
BILIRUB SERPL-MCNC: 0.7 MG/DL (ref 0.3–1)
BILIRUB UR QL STRIP: NEGATIVE
BUN SERPL-MCNC: 11 MG/DL (ref 7–25)
BUN SERPL-MCNC: 15 MG/DL (ref 7–25)
BUN SERPL-MCNC: 17 MG/DL (ref 7–25)
BUN SERPL-MCNC: 17 MG/DL (ref 7–25)
CALCIUM SERPL-MCNC: 10.1 MG/DL (ref 8.6–10.3)
CALCIUM SERPL-MCNC: 9.3 MG/DL (ref 8.6–10.3)
CALCIUM SERPL-MCNC: 9.5 MG/DL (ref 8.6–10.3)
CALCIUM SERPL-MCNC: 9.6 MG/DL (ref 8.6–10.3)
CHLORIDE SERPL-SCNC: 89 MMOL/L (ref 98–107)
CHLORIDE SERPL-SCNC: 91 MMOL/L (ref 98–107)
CHLORIDE SERPL-SCNC: 92 MMOL/L (ref 98–107)
CHLORIDE SERPL-SCNC: 94 MMOL/L (ref 98–107)
CO2 SERPL-SCNC: 25 MMOL/L (ref 21–31)
CO2 SERPL-SCNC: 27 MMOL/L (ref 21–31)
CO2 SERPL-SCNC: 28 MMOL/L (ref 21–31)
CO2 SERPL-SCNC: 30 MMOL/L (ref 21–31)
COLOR UR AUTO: YELLOW
CREAT SERPL-MCNC: 0.81 MG/DL (ref 0.7–1.3)
CREAT SERPL-MCNC: 0.91 MG/DL (ref 0.7–1.3)
CREAT SERPL-MCNC: 1.01 MG/DL (ref 0.7–1.3)
CREAT SERPL-MCNC: 1.11 MG/DL (ref 0.7–1.3)
DIFFERENTIAL METHOD BLD: ABNORMAL
DIFFERENTIAL METHOD BLD: ABNORMAL
DIFFERENTIAL METHOD BLD: NORMAL
DIFFERENTIAL METHOD BLD: NORMAL
EOSINOPHIL # BLD AUTO: 0.1 10E9/L (ref 0–0.7)
EOSINOPHIL # BLD AUTO: 0.2 10E9/L (ref 0–0.7)
EOSINOPHIL NFR BLD AUTO: 1.8 %
EOSINOPHIL NFR BLD AUTO: 2.5 %
EOSINOPHIL NFR BLD AUTO: 2.8 %
EOSINOPHIL NFR BLD AUTO: 2.9 %
ERYTHROCYTE [DISTWIDTH] IN BLOOD BY AUTOMATED COUNT: 12.4 % (ref 10–15)
ERYTHROCYTE [DISTWIDTH] IN BLOOD BY AUTOMATED COUNT: 12.8 % (ref 10–15)
ERYTHROCYTE [DISTWIDTH] IN BLOOD BY AUTOMATED COUNT: 13 % (ref 10–15)
ERYTHROCYTE [DISTWIDTH] IN BLOOD BY AUTOMATED COUNT: 13.3 % (ref 10–15)
GFR SERPL CREATININE-BSD FRML MDRD: 66 ML/MIN/{1.73_M2}
GFR SERPL CREATININE-BSD FRML MDRD: 74 ML/MIN/{1.73_M2}
GFR SERPL CREATININE-BSD FRML MDRD: 83 ML/MIN/{1.73_M2}
GFR SERPL CREATININE-BSD FRML MDRD: >90 ML/MIN/{1.73_M2}
GLUCOSE SERPL-MCNC: 101 MG/DL (ref 70–105)
GLUCOSE SERPL-MCNC: 102 MG/DL (ref 70–105)
GLUCOSE SERPL-MCNC: 115 MG/DL (ref 70–105)
GLUCOSE SERPL-MCNC: 174 MG/DL (ref 70–105)
GLUCOSE UR STRIP-MCNC: NEGATIVE MG/DL
HCT VFR BLD AUTO: 38.3 % (ref 40–53)
HCT VFR BLD AUTO: 39.7 % (ref 40–53)
HCT VFR BLD AUTO: 40.1 % (ref 40–53)
HCT VFR BLD AUTO: 42.5 % (ref 40–53)
HGB BLD-MCNC: 13.4 G/DL (ref 13.3–17.7)
HGB BLD-MCNC: 14.1 G/DL (ref 13.3–17.7)
HGB BLD-MCNC: 14.1 G/DL (ref 13.3–17.7)
HGB BLD-MCNC: 14.7 G/DL (ref 13.3–17.7)
HGB UR QL STRIP: ABNORMAL
IMM GRANULOCYTES # BLD: 0 10E9/L (ref 0–0.4)
IMM GRANULOCYTES NFR BLD: 0.5 %
IMM GRANULOCYTES NFR BLD: 0.6 %
INR PPP: 1.2 (ref 0–1.3)
KETONES UR STRIP-MCNC: NEGATIVE MG/DL
LACTATE SERPL-SCNC: 0.6 MMOL/L (ref 0.5–2.2)
LDH SERPL L TO P-CCNC: 180 U/L (ref 140–271)
LEUKOCYTE ESTERASE UR QL STRIP: NEGATIVE
LIPASE SERPL-CCNC: 56 U/L (ref 11–82)
LYMPHOCYTES # BLD AUTO: 1.6 10E9/L (ref 0.8–5.3)
LYMPHOCYTES # BLD AUTO: 1.6 10E9/L (ref 0.8–5.3)
LYMPHOCYTES # BLD AUTO: 1.8 10E9/L (ref 0.8–5.3)
LYMPHOCYTES # BLD AUTO: 2 10E9/L (ref 0.8–5.3)
LYMPHOCYTES NFR BLD AUTO: 20.7 %
LYMPHOCYTES NFR BLD AUTO: 22.7 %
LYMPHOCYTES NFR BLD AUTO: 26.4 %
LYMPHOCYTES NFR BLD AUTO: 31.2 %
MCH RBC QN AUTO: 30.5 PG (ref 26.5–33)
MCH RBC QN AUTO: 30.9 PG (ref 26.5–33)
MCH RBC QN AUTO: 31.1 PG (ref 26.5–33)
MCH RBC QN AUTO: 31.1 PG (ref 26.5–33)
MCHC RBC AUTO-ENTMCNC: 34.6 G/DL (ref 31.5–36.5)
MCHC RBC AUTO-ENTMCNC: 35 G/DL (ref 31.5–36.5)
MCHC RBC AUTO-ENTMCNC: 35.2 G/DL (ref 31.5–36.5)
MCHC RBC AUTO-ENTMCNC: 35.5 G/DL (ref 31.5–36.5)
MCV RBC AUTO: 87 FL (ref 78–100)
MCV RBC AUTO: 87 FL (ref 78–100)
MCV RBC AUTO: 89 FL (ref 78–100)
MCV RBC AUTO: 89 FL (ref 78–100)
MONOCYTES # BLD AUTO: 0.9 10E9/L (ref 0–1.3)
MONOCYTES NFR BLD AUTO: 11.6 %
MONOCYTES NFR BLD AUTO: 12.9 %
MONOCYTES NFR BLD AUTO: 13.4 %
MONOCYTES NFR BLD AUTO: 14.4 %
NEUTROPHILS # BLD AUTO: 3.1 10E9/L (ref 1.6–8.3)
NEUTROPHILS # BLD AUTO: 3.8 10E9/L (ref 1.6–8.3)
NEUTROPHILS # BLD AUTO: 4.1 10E9/L (ref 1.6–8.3)
NEUTROPHILS # BLD AUTO: 5 10E9/L (ref 1.6–8.3)
NEUTROPHILS NFR BLD AUTO: 50.1 %
NEUTROPHILS NFR BLD AUTO: 56.4 %
NEUTROPHILS NFR BLD AUTO: 60.4 %
NEUTROPHILS NFR BLD AUTO: 64.8 %
NITRATE UR QL: NEGATIVE
PH UR STRIP: 5.5 PH (ref 5–9)
PLATELET # BLD AUTO: 278 10E9/L (ref 150–450)
PLATELET # BLD AUTO: 291 10E9/L (ref 150–450)
PLATELET # BLD AUTO: 307 10E9/L (ref 150–450)
PLATELET # BLD AUTO: 364 10E9/L (ref 150–450)
POTASSIUM SERPL-SCNC: 3.4 MMOL/L (ref 3.5–5.1)
POTASSIUM SERPL-SCNC: 4 MMOL/L (ref 3.5–5.1)
POTASSIUM SERPL-SCNC: 4.1 MMOL/L (ref 3.5–5.1)
POTASSIUM SERPL-SCNC: 4.2 MMOL/L (ref 3.5–5.1)
PROT SERPL-MCNC: 7.3 G/DL (ref 6.4–8.9)
PROT SERPL-MCNC: 7.4 G/DL (ref 6.4–8.9)
PROT SERPL-MCNC: 7.7 G/DL (ref 6.4–8.9)
PROT SERPL-MCNC: 8.3 G/DL (ref 6.4–8.9)
RBC # BLD AUTO: 4.4 10E12/L (ref 4.4–5.9)
RBC # BLD AUTO: 4.53 10E12/L (ref 4.4–5.9)
RBC # BLD AUTO: 4.54 10E12/L (ref 4.4–5.9)
RBC # BLD AUTO: 4.76 10E12/L (ref 4.4–5.9)
RBC #/AREA URNS AUTO: NORMAL /HPF
SODIUM SERPL-SCNC: 120 MMOL/L (ref 134–144)
SODIUM SERPL-SCNC: 123 MMOL/L (ref 134–144)
SODIUM SERPL-SCNC: 126 MMOL/L (ref 134–144)
SODIUM SERPL-SCNC: 127 MMOL/L (ref 134–144)
SODIUM SERPL-SCNC: 128 MMOL/L (ref 134–144)
SOURCE: ABNORMAL
SP GR UR STRIP: <1.005 (ref 1–1.03)
TROPONIN I SERPL-MCNC: 3.8 PG/ML
TSH SERPL DL<=0.05 MIU/L-ACNC: 1.56 IU/ML (ref 0.34–5.6)
UROBILINOGEN UR STRIP-ACNC: 0.2 EU/DL (ref 0.2–1)
WBC # BLD AUTO: 6.3 10E9/L (ref 4–11)
WBC # BLD AUTO: 6.7 10E9/L (ref 4–11)
WBC # BLD AUTO: 6.8 10E9/L (ref 4–11)
WBC # BLD AUTO: 7.7 10E9/L (ref 4–11)
WBC #/AREA URNS AUTO: NORMAL /HPF

## 2019-01-01 PROCEDURE — 85610 PROTHROMBIN TIME: CPT | Performed by: PHYSICIAN ASSISTANT

## 2019-01-01 PROCEDURE — 25000128 H RX IP 250 OP 636: Performed by: SURGERY

## 2019-01-01 PROCEDURE — 99214 OFFICE O/P EST MOD 30 MIN: CPT | Performed by: INTERNAL MEDICINE

## 2019-01-01 PROCEDURE — 25800030 ZZH RX IP 258 OP 636: Performed by: INTERNAL MEDICINE

## 2019-01-01 PROCEDURE — A9575 INJ GADOTERATE MEGLUMI 0.1ML: HCPCS | Performed by: NURSE PRACTITIONER

## 2019-01-01 PROCEDURE — 96375 TX/PRO/DX INJ NEW DRUG ADDON: CPT

## 2019-01-01 PROCEDURE — 99283 EMERGENCY DEPT VISIT LOW MDM: CPT | Mod: Z6 | Performed by: EMERGENCY MEDICINE

## 2019-01-01 PROCEDURE — 85025 COMPLETE CBC W/AUTO DIFF WBC: CPT | Performed by: INTERNAL MEDICINE

## 2019-01-01 PROCEDURE — 25000125 ZZHC RX 250: Performed by: SURGERY

## 2019-01-01 PROCEDURE — 84484 ASSAY OF TROPONIN QUANT: CPT | Performed by: PHYSICIAN ASSISTANT

## 2019-01-01 PROCEDURE — 72157 MRI CHEST SPINE W/O & W/DYE: CPT

## 2019-01-01 PROCEDURE — G0463 HOSPITAL OUTPT CLINIC VISIT: HCPCS | Mod: 25

## 2019-01-01 PROCEDURE — 93010 ELECTROCARDIOGRAM REPORT: CPT | Performed by: INTERNAL MEDICINE

## 2019-01-01 PROCEDURE — 74174 CTA ABD&PLVS W/CONTRAST: CPT

## 2019-01-01 PROCEDURE — 25000125 ZZHC RX 250: Performed by: NURSE ANESTHETIST, CERTIFIED REGISTERED

## 2019-01-01 PROCEDURE — 96360 HYDRATION IV INFUSION INIT: CPT | Performed by: PHYSICIAN ASSISTANT

## 2019-01-01 PROCEDURE — G0463 HOSPITAL OUTPT CLINIC VISIT: HCPCS

## 2019-01-01 PROCEDURE — 71045 X-RAY EXAM CHEST 1 VIEW: CPT

## 2019-01-01 PROCEDURE — 96417 CHEMO IV INFUS EACH ADDL SEQ: CPT

## 2019-01-01 PROCEDURE — 25800030 ZZH RX IP 258 OP 636: Performed by: NURSE ANESTHETIST, CERTIFIED REGISTERED

## 2019-01-01 PROCEDURE — 85025 COMPLETE CBC W/AUTO DIFF WBC: CPT | Mod: ZL | Performed by: INTERNAL MEDICINE

## 2019-01-01 PROCEDURE — 25000128 H RX IP 250 OP 636: Performed by: INTERNAL MEDICINE

## 2019-01-01 PROCEDURE — 25500064 ZZH RX 255 OP 636: Performed by: INTERNAL MEDICINE

## 2019-01-01 PROCEDURE — A9575 INJ GADOTERATE MEGLUMI 0.1ML: HCPCS | Performed by: INTERNAL MEDICINE

## 2019-01-01 PROCEDURE — 37000008 ZZH ANESTHESIA TECHNICAL FEE, 1ST 30 MIN: Performed by: SURGERY

## 2019-01-01 PROCEDURE — 85025 COMPLETE CBC W/AUTO DIFF WBC: CPT | Performed by: EMERGENCY MEDICINE

## 2019-01-01 PROCEDURE — 80053 COMPREHEN METABOLIC PANEL: CPT | Mod: ZL | Performed by: INTERNAL MEDICINE

## 2019-01-01 PROCEDURE — 25000128 H RX IP 250 OP 636: Performed by: NURSE ANESTHETIST, CERTIFIED REGISTERED

## 2019-01-01 PROCEDURE — A9552 F18 FDG: HCPCS | Performed by: INTERNAL MEDICINE

## 2019-01-01 PROCEDURE — 36415 COLL VENOUS BLD VENIPUNCTURE: CPT | Performed by: EMERGENCY MEDICINE

## 2019-01-01 PROCEDURE — 99284 EMERGENCY DEPT VISIT MOD MDM: CPT | Mod: 25 | Performed by: EMERGENCY MEDICINE

## 2019-01-01 PROCEDURE — 36000052 ZZH SURGERY LEVEL 2 EA 15 ADDTL MIN: Performed by: SURGERY

## 2019-01-01 PROCEDURE — C1788 PORT, INDWELLING, IMP: HCPCS | Performed by: SURGERY

## 2019-01-01 PROCEDURE — 83605 ASSAY OF LACTIC ACID: CPT | Performed by: PHYSICIAN ASSISTANT

## 2019-01-01 PROCEDURE — 36561 INSERT TUNNELED CV CATH: CPT | Performed by: SURGERY

## 2019-01-01 PROCEDURE — 36415 COLL VENOUS BLD VENIPUNCTURE: CPT | Mod: ZL | Performed by: INTERNAL MEDICINE

## 2019-01-01 PROCEDURE — 36561 INSERT TUNNELED CV CATH: CPT | Performed by: NURSE ANESTHETIST, CERTIFIED REGISTERED

## 2019-01-01 PROCEDURE — 80053 COMPREHEN METABOLIC PANEL: CPT | Performed by: PHYSICIAN ASSISTANT

## 2019-01-01 PROCEDURE — 25500064 ZZH RX 255 OP 636: Performed by: NURSE PRACTITIONER

## 2019-01-01 PROCEDURE — 96413 CHEMO IV INFUSION 1 HR: CPT

## 2019-01-01 PROCEDURE — 36000054 ZZH SURGERY LEVEL 2 W FLUORO 1ST 30 MIN: Performed by: SURGERY

## 2019-01-01 PROCEDURE — 81001 URINALYSIS AUTO W/SCOPE: CPT | Performed by: PHYSICIAN ASSISTANT

## 2019-01-01 PROCEDURE — 34300033 ZZH RX 343: Performed by: INTERNAL MEDICINE

## 2019-01-01 PROCEDURE — 80053 COMPREHEN METABOLIC PANEL: CPT | Performed by: EMERGENCY MEDICINE

## 2019-01-01 PROCEDURE — 27210794 ZZH OR GENERAL SUPPLY STERILE: Performed by: SURGERY

## 2019-01-01 PROCEDURE — 85025 COMPLETE CBC W/AUTO DIFF WBC: CPT | Performed by: PHYSICIAN ASSISTANT

## 2019-01-01 PROCEDURE — 72158 MRI LUMBAR SPINE W/O & W/DYE: CPT

## 2019-01-01 PROCEDURE — 93005 ELECTROCARDIOGRAM TRACING: CPT | Performed by: PHYSICIAN ASSISTANT

## 2019-01-01 PROCEDURE — 25500064 ZZH RX 255 OP 636: Performed by: PHYSICIAN ASSISTANT

## 2019-01-01 PROCEDURE — 40000306 ZZH STATISTIC PRE PROC ASSESS II: Performed by: SURGERY

## 2019-01-01 PROCEDURE — 90662 IIV NO PRSV INCREASED AG IM: CPT

## 2019-01-01 PROCEDURE — G0399 HOME SLEEP TEST/TYPE 3 PORTA: HCPCS | Mod: TC

## 2019-01-01 PROCEDURE — 25800030 ZZH RX IP 258 OP 636: Performed by: PHYSICIAN ASSISTANT

## 2019-01-01 PROCEDURE — 78815 PET IMAGE W/CT SKULL-THIGH: CPT | Mod: PI

## 2019-01-01 PROCEDURE — 40000278 XR SURGERY CARM FLUORO LESS THAN 5 MIN

## 2019-01-01 PROCEDURE — 96367 TX/PROPH/DG ADDL SEQ IV INF: CPT

## 2019-01-01 PROCEDURE — G0008 ADMIN INFLUENZA VIRUS VAC: HCPCS

## 2019-01-01 PROCEDURE — 71046 X-RAY EXAM CHEST 2 VIEWS: CPT

## 2019-01-01 PROCEDURE — 96372 THER/PROPH/DIAG INJ SC/IM: CPT | Performed by: EMERGENCY MEDICINE

## 2019-01-01 PROCEDURE — 83690 ASSAY OF LIPASE: CPT | Performed by: PHYSICIAN ASSISTANT

## 2019-01-01 PROCEDURE — 83615 LACTATE (LD) (LDH) ENZYME: CPT | Mod: ZL | Performed by: INTERNAL MEDICINE

## 2019-01-01 PROCEDURE — 85730 THROMBOPLASTIN TIME PARTIAL: CPT | Performed by: PHYSICIAN ASSISTANT

## 2019-01-01 PROCEDURE — 99285 EMERGENCY DEPT VISIT HI MDM: CPT | Mod: 25 | Performed by: PHYSICIAN ASSISTANT

## 2019-01-01 PROCEDURE — 99284 EMERGENCY DEPT VISIT MOD MDM: CPT | Mod: Z6 | Performed by: PHYSICIAN ASSISTANT

## 2019-01-01 PROCEDURE — 70553 MRI BRAIN STEM W/O & W/DYE: CPT

## 2019-01-01 PROCEDURE — 25000128 H RX IP 250 OP 636: Performed by: EMERGENCY MEDICINE

## 2019-01-01 PROCEDURE — 84295 ASSAY OF SERUM SODIUM: CPT | Performed by: PHYSICIAN ASSISTANT

## 2019-01-01 PROCEDURE — 71000027 ZZH RECOVERY PHASE 2 EACH 15 MINS: Performed by: SURGERY

## 2019-01-01 PROCEDURE — 84443 ASSAY THYROID STIM HORMONE: CPT | Mod: GZ | Performed by: INTERNAL MEDICINE

## 2019-01-01 PROCEDURE — 99284 EMERGENCY DEPT VISIT MOD MDM: CPT | Performed by: EMERGENCY MEDICINE

## 2019-01-01 PROCEDURE — 37000009 ZZH ANESTHESIA TECHNICAL FEE, EACH ADDTL 15 MIN: Performed by: SURGERY

## 2019-01-01 PROCEDURE — 99205 OFFICE O/P NEW HI 60 MIN: CPT | Performed by: INTERNAL MEDICINE

## 2019-01-01 PROCEDURE — 36415 COLL VENOUS BLD VENIPUNCTURE: CPT | Performed by: PHYSICIAN ASSISTANT

## 2019-01-01 PROCEDURE — 80053 COMPREHEN METABOLIC PANEL: CPT | Performed by: INTERNAL MEDICINE

## 2019-01-01 DEVICE — CATH PORT MRI POWERPORT 8FR 1808060: Type: IMPLANTABLE DEVICE | Site: JUGULAR | Status: FUNCTIONAL

## 2019-01-01 RX ORDER — HEPARIN SODIUM (PORCINE) LOCK FLUSH IV SOLN 100 UNIT/ML 100 UNIT/ML
500 SOLUTION INTRAVENOUS
Status: COMPLETED | OUTPATIENT
Start: 2019-01-01 | End: 2019-01-01

## 2019-01-01 RX ORDER — LORAZEPAM 0.5 MG/1
0.5 TABLET ORAL EVERY 4 HOURS PRN
Qty: 30 TABLET | Refills: 3 | Status: SHIPPED | OUTPATIENT
Start: 2019-01-01 | End: 2020-01-01

## 2019-01-01 RX ORDER — HEPARIN SODIUM (PORCINE) LOCK FLUSH IV SOLN 100 UNIT/ML 100 UNIT/ML
500 SOLUTION INTRAVENOUS
Status: CANCELLED
Start: 2020-01-01

## 2019-01-01 RX ORDER — ALBUTEROL SULFATE 0.83 MG/ML
2.5 SOLUTION RESPIRATORY (INHALATION)
Status: CANCELLED | OUTPATIENT
Start: 2019-01-01

## 2019-01-01 RX ORDER — PROPOFOL 10 MG/ML
INJECTION, EMULSION INTRAVENOUS PRN
Status: DISCONTINUED | OUTPATIENT
Start: 2019-01-01 | End: 2019-01-01

## 2019-01-01 RX ORDER — NALOXONE HYDROCHLORIDE 0.4 MG/ML
.1-.4 INJECTION, SOLUTION INTRAMUSCULAR; INTRAVENOUS; SUBCUTANEOUS
Status: CANCELLED | OUTPATIENT
Start: 2019-01-01

## 2019-01-01 RX ORDER — ONDANSETRON 2 MG/ML
4 INJECTION INTRAMUSCULAR; INTRAVENOUS EVERY 30 MIN PRN
Status: DISCONTINUED | OUTPATIENT
Start: 2019-01-01 | End: 2019-01-01 | Stop reason: HOSPADM

## 2019-01-01 RX ORDER — DIPHENHYDRAMINE HYDROCHLORIDE 50 MG/ML
50 INJECTION INTRAMUSCULAR; INTRAVENOUS
Status: CANCELLED
Start: 2020-01-01

## 2019-01-01 RX ORDER — MEPERIDINE HYDROCHLORIDE 50 MG/ML
25 INJECTION INTRAMUSCULAR; INTRAVENOUS; SUBCUTANEOUS EVERY 30 MIN PRN
Status: CANCELLED | OUTPATIENT
Start: 2020-01-01

## 2019-01-01 RX ORDER — PALONOSETRON 0.05 MG/ML
0.25 INJECTION, SOLUTION INTRAVENOUS ONCE
Status: COMPLETED | OUTPATIENT
Start: 2019-01-01 | End: 2019-01-01

## 2019-01-01 RX ORDER — METHYLPREDNISOLONE SODIUM SUCCINATE 125 MG/2ML
125 INJECTION, POWDER, LYOPHILIZED, FOR SOLUTION INTRAMUSCULAR; INTRAVENOUS
Status: CANCELLED
Start: 2019-01-01

## 2019-01-01 RX ORDER — SODIUM CHLORIDE 9 MG/ML
1000 INJECTION, SOLUTION INTRAVENOUS CONTINUOUS PRN
Status: CANCELLED
Start: 2019-01-01

## 2019-01-01 RX ORDER — MEPERIDINE HYDROCHLORIDE 50 MG/ML
25 INJECTION INTRAMUSCULAR; INTRAVENOUS; SUBCUTANEOUS EVERY 30 MIN PRN
Status: CANCELLED | OUTPATIENT
Start: 2019-01-01

## 2019-01-01 RX ORDER — SODIUM CHLORIDE 9 MG/ML
1000 INJECTION, SOLUTION INTRAVENOUS CONTINUOUS PRN
Status: CANCELLED
Start: 2020-01-01

## 2019-01-01 RX ORDER — CEFAZOLIN SODIUM 1 G/50ML
1 INJECTION, SOLUTION INTRAVENOUS SEE ADMIN INSTRUCTIONS
Status: DISCONTINUED | OUTPATIENT
Start: 2019-01-01 | End: 2019-01-01 | Stop reason: HOSPADM

## 2019-01-01 RX ORDER — EPINEPHRINE 0.3 MG/.3ML
0.3 INJECTION SUBCUTANEOUS EVERY 5 MIN PRN
Status: CANCELLED | OUTPATIENT
Start: 2019-01-01

## 2019-01-01 RX ORDER — SODIUM CHLORIDE, SODIUM LACTATE, POTASSIUM CHLORIDE, CALCIUM CHLORIDE 600; 310; 30; 20 MG/100ML; MG/100ML; MG/100ML; MG/100ML
INJECTION, SOLUTION INTRAVENOUS CONTINUOUS
Status: DISCONTINUED | OUTPATIENT
Start: 2019-01-01 | End: 2019-01-01 | Stop reason: HOSPADM

## 2019-01-01 RX ORDER — SODIUM CHLORIDE, SODIUM LACTATE, POTASSIUM CHLORIDE, CALCIUM CHLORIDE 600; 310; 30; 20 MG/100ML; MG/100ML; MG/100ML; MG/100ML
INJECTION, SOLUTION INTRAVENOUS CONTINUOUS PRN
Status: DISCONTINUED | OUTPATIENT
Start: 2019-01-01 | End: 2019-01-01

## 2019-01-01 RX ORDER — ALBUTEROL SULFATE 0.83 MG/ML
2.5 SOLUTION RESPIRATORY (INHALATION)
Status: CANCELLED | OUTPATIENT
Start: 2020-01-01

## 2019-01-01 RX ORDER — METHYLPREDNISOLONE SODIUM SUCCINATE 125 MG/2ML
125 INJECTION, POWDER, LYOPHILIZED, FOR SOLUTION INTRAMUSCULAR; INTRAVENOUS
Status: CANCELLED
Start: 2020-01-01

## 2019-01-01 RX ORDER — OXYCODONE HYDROCHLORIDE 5 MG/1
5 TABLET ORAL EVERY 6 HOURS PRN
Qty: 25 TABLET | Refills: 0 | Status: SHIPPED | OUTPATIENT
Start: 2019-01-01 | End: 2020-01-01

## 2019-01-01 RX ORDER — PROPOFOL 10 MG/ML
INJECTION, EMULSION INTRAVENOUS CONTINUOUS PRN
Status: DISCONTINUED | OUTPATIENT
Start: 2019-01-01 | End: 2019-01-01

## 2019-01-01 RX ORDER — DIPHENHYDRAMINE HYDROCHLORIDE 50 MG/ML
50 INJECTION INTRAMUSCULAR; INTRAVENOUS
Status: CANCELLED
Start: 2019-01-01

## 2019-01-01 RX ORDER — BUPIVACAINE HYDROCHLORIDE AND EPINEPHRINE 5; 5 MG/ML; UG/ML
INJECTION, SOLUTION PERINEURAL PRN
Status: DISCONTINUED | OUTPATIENT
Start: 2019-01-01 | End: 2019-01-01 | Stop reason: HOSPADM

## 2019-01-01 RX ORDER — EPINEPHRINE 0.3 MG/.3ML
0.3 INJECTION SUBCUTANEOUS EVERY 5 MIN PRN
Status: CANCELLED | OUTPATIENT
Start: 2020-01-01

## 2019-01-01 RX ORDER — NALOXONE HYDROCHLORIDE 0.4 MG/ML
.1-.4 INJECTION, SOLUTION INTRAMUSCULAR; INTRAVENOUS; SUBCUTANEOUS
Status: DISCONTINUED | OUTPATIENT
Start: 2019-01-01 | End: 2019-01-01 | Stop reason: HOSPADM

## 2019-01-01 RX ORDER — BUPROPION HYDROCHLORIDE 100 MG/1
100 TABLET ORAL 2 TIMES DAILY
Qty: 30 TABLET | Refills: 0 | Status: SHIPPED | OUTPATIENT
Start: 2019-01-01 | End: 2019-01-01

## 2019-01-01 RX ORDER — EPINEPHRINE 1 MG/ML
0.3 INJECTION, SOLUTION, CONCENTRATE INTRAVENOUS EVERY 5 MIN PRN
Status: CANCELLED | OUTPATIENT
Start: 2019-01-01

## 2019-01-01 RX ORDER — PALONOSETRON 0.05 MG/ML
0.25 INJECTION, SOLUTION INTRAVENOUS ONCE
Status: CANCELLED
Start: 2019-01-01

## 2019-01-01 RX ORDER — OXYCODONE HYDROCHLORIDE 5 MG/1
5 TABLET ORAL EVERY 6 HOURS PRN
Qty: 25 TABLET | Refills: 0 | Status: SHIPPED | OUTPATIENT
Start: 2019-01-01 | End: 2019-01-01

## 2019-01-01 RX ORDER — NALOXONE HYDROCHLORIDE 0.4 MG/ML
.1-.4 INJECTION, SOLUTION INTRAMUSCULAR; INTRAVENOUS; SUBCUTANEOUS
Status: CANCELLED | OUTPATIENT
Start: 2020-01-01

## 2019-01-01 RX ORDER — LIDOCAINE 40 MG/G
CREAM TOPICAL
Status: DISCONTINUED | OUTPATIENT
Start: 2019-01-01 | End: 2019-01-01 | Stop reason: HOSPADM

## 2019-01-01 RX ORDER — HYDROMORPHONE HYDROCHLORIDE 1 MG/ML
0.5 INJECTION, SOLUTION INTRAMUSCULAR; INTRAVENOUS; SUBCUTANEOUS
Status: COMPLETED | OUTPATIENT
Start: 2019-01-01 | End: 2019-01-01

## 2019-01-01 RX ORDER — LORAZEPAM 2 MG/ML
0.5 INJECTION INTRAMUSCULAR EVERY 4 HOURS PRN
Status: CANCELLED
Start: 2019-01-01

## 2019-01-01 RX ORDER — LORAZEPAM 2 MG/ML
0.5 INJECTION INTRAMUSCULAR EVERY 4 HOURS PRN
Status: CANCELLED
Start: 2020-01-01

## 2019-01-01 RX ORDER — ALBUTEROL SULFATE 90 UG/1
1-2 AEROSOL, METERED RESPIRATORY (INHALATION)
Status: CANCELLED
Start: 2020-01-01

## 2019-01-01 RX ORDER — LIDOCAINE/PRILOCAINE 2.5 %-2.5%
CREAM (GRAM) TOPICAL
Qty: 30 G | Refills: 1 | Status: SHIPPED | OUTPATIENT
Start: 2019-01-01

## 2019-01-01 RX ORDER — ALBUTEROL SULFATE 90 UG/1
1-2 AEROSOL, METERED RESPIRATORY (INHALATION)
Status: CANCELLED
Start: 2019-01-01

## 2019-01-01 RX ORDER — EPINEPHRINE 1 MG/ML
0.3 INJECTION, SOLUTION, CONCENTRATE INTRAVENOUS EVERY 5 MIN PRN
Status: CANCELLED | OUTPATIENT
Start: 2020-01-01

## 2019-01-01 RX ORDER — FENTANYL CITRATE 50 UG/ML
25-50 INJECTION, SOLUTION INTRAMUSCULAR; INTRAVENOUS
Status: DISCONTINUED | OUTPATIENT
Start: 2019-01-01 | End: 2019-01-01 | Stop reason: HOSPADM

## 2019-01-01 RX ORDER — CEFAZOLIN SODIUM 2 G/100ML
2 INJECTION, SOLUTION INTRAVENOUS
Status: COMPLETED | OUTPATIENT
Start: 2019-01-01 | End: 2019-01-01

## 2019-01-01 RX ORDER — ONDANSETRON 4 MG/1
4 TABLET, ORALLY DISINTEGRATING ORAL EVERY 30 MIN PRN
Status: DISCONTINUED | OUTPATIENT
Start: 2019-01-01 | End: 2019-01-01 | Stop reason: HOSPADM

## 2019-01-01 RX ORDER — PROCHLORPERAZINE MALEATE 10 MG
10 TABLET ORAL EVERY 6 HOURS PRN
Qty: 30 TABLET | Refills: 3 | Status: SHIPPED | OUTPATIENT
Start: 2019-01-01 | End: 2020-01-01

## 2019-01-01 RX ORDER — FENTANYL CITRATE 50 UG/ML
INJECTION, SOLUTION INTRAMUSCULAR; INTRAVENOUS PRN
Status: DISCONTINUED | OUTPATIENT
Start: 2019-01-01 | End: 2019-01-01

## 2019-01-01 RX ADMIN — LIDOCAINE HYDROCHLORIDE 0.1 ML: 10 INJECTION, SOLUTION EPIDURAL; INFILTRATION; INTRACAUDAL; PERINEURAL at 13:27

## 2019-01-01 RX ADMIN — Medication 500 UNITS: at 11:34

## 2019-01-01 RX ADMIN — PROPOFOL 60 MG: 10 INJECTION, EMULSION INTRAVENOUS at 14:13

## 2019-01-01 RX ADMIN — FENTANYL CITRATE 50 MCG: 50 INJECTION, SOLUTION INTRAMUSCULAR; INTRAVENOUS at 14:13

## 2019-01-01 RX ADMIN — CEFAZOLIN SODIUM 2 G: 2 INJECTION, SOLUTION INTRAVENOUS at 14:09

## 2019-01-01 RX ADMIN — GADOTERATE MEGLUMINE 17 ML: 376.9 INJECTION INTRAVENOUS at 08:56

## 2019-01-01 RX ADMIN — SODIUM CHLORIDE 250 ML: 9 INJECTION, SOLUTION INTRAVENOUS at 10:05

## 2019-01-01 RX ADMIN — PALONOSETRON 0.25 MG: 0.05 INJECTION, SOLUTION INTRAVENOUS at 10:51

## 2019-01-01 RX ADMIN — HYDROMORPHONE HYDROCHLORIDE 0.5 MG: 1 INJECTION, SOLUTION INTRAMUSCULAR; INTRAVENOUS; SUBCUTANEOUS at 11:20

## 2019-01-01 RX ADMIN — SODIUM CHLORIDE, POTASSIUM CHLORIDE, SODIUM LACTATE AND CALCIUM CHLORIDE: 600; 310; 30; 20 INJECTION, SOLUTION INTRAVENOUS at 13:27

## 2019-01-01 RX ADMIN — SODIUM CHLORIDE 1000 ML: 9 INJECTION, SOLUTION INTRAVENOUS at 12:58

## 2019-01-01 RX ADMIN — CARBOPLATIN 530 MG: 10 INJECTION, SOLUTION INTRAVENOUS at 12:36

## 2019-01-01 RX ADMIN — SODIUM CHLORIDE 1000 ML: 9 INJECTION, SOLUTION INTRAVENOUS at 14:32

## 2019-01-01 RX ADMIN — IOHEXOL 99 ML: 350 INJECTION, SOLUTION INTRAVENOUS at 12:44

## 2019-01-01 RX ADMIN — MIDAZOLAM 2 MG: 1 INJECTION INTRAMUSCULAR; INTRAVENOUS at 13:41

## 2019-01-01 RX ADMIN — ATEZOLIZUMAB 1200 MG: 1200 INJECTION, SOLUTION INTRAVENOUS at 11:25

## 2019-01-01 RX ADMIN — SODIUM CHLORIDE 150 MG: 9 INJECTION, SOLUTION INTRAVENOUS at 10:53

## 2019-01-01 RX ADMIN — GADOTERATE MEGLUMINE 17 ML: 376.9 INJECTION INTRAVENOUS at 14:11

## 2019-01-01 RX ADMIN — FLUDEOXYGLUCOSE F-18 12.96 MCI.: 500 INJECTION, SOLUTION INTRAVENOUS at 17:04

## 2019-01-01 RX ADMIN — SODIUM CHLORIDE 250 ML: 9 INJECTION, SOLUTION INTRAVENOUS at 10:48

## 2019-01-01 RX ADMIN — SODIUM CHLORIDE, POTASSIUM CHLORIDE, SODIUM LACTATE AND CALCIUM CHLORIDE: 600; 310; 30; 20 INJECTION, SOLUTION INTRAVENOUS at 14:09

## 2019-01-01 RX ADMIN — PROPOFOL 140 MCG/KG/MIN: 10 INJECTION, EMULSION INTRAVENOUS at 14:13

## 2019-01-01 RX ADMIN — ETOPOSIDE 200 MG: 20 INJECTION, SOLUTION, CONCENTRATE INTRAVENOUS at 13:32

## 2019-01-01 RX ADMIN — ETOPOSIDE 200 MG: 20 INJECTION, SOLUTION, CONCENTRATE INTRAVENOUS at 10:19

## 2019-01-01 ASSESSMENT — ENCOUNTER SYMPTOMS
ADENOPATHY: 0
CONFUSION: 0
VOMITING: 0
BRUISES/BLEEDS EASILY: 0
BACK PAIN: 1
DIARRHEA: 0
CHILLS: 0
NAUSEA: 0
DYSRHYTHMIAS: 1
ABDOMINAL PAIN: 1
LIGHT-HEADEDNESS: 0
CHILLS: 0
CONSTIPATION: 0
FEVER: 0
SHORTNESS OF BREATH: 1
FLANK PAIN: 1
WOUND: 0
CHEST TIGHTNESS: 0
DYSURIA: 0
CHEST TIGHTNESS: 0
COUGH: 1
AGITATION: 0
VOMITING: 0
NAUSEA: 0
BACK PAIN: 1
SHORTNESS OF BREATH: 0
HEMATURIA: 0
FEVER: 0

## 2019-01-01 ASSESSMENT — PAIN SCALES - GENERAL
PAINLEVEL: MODERATE PAIN (4)
PAINLEVEL: NO PAIN (0)

## 2019-01-01 ASSESSMENT — MIFFLIN-ST. JEOR
SCORE: 1613.86
SCORE: 1600
SCORE: 1559.17
SCORE: 1559.17

## 2019-01-01 ASSESSMENT — LIFESTYLE VARIABLES: TOBACCO_USE: 1

## 2019-06-17 DIAGNOSIS — I10 BENIGN ESSENTIAL HYPERTENSION: ICD-10-CM

## 2019-06-19 RX ORDER — HYDROCHLOROTHIAZIDE 25 MG/1
TABLET ORAL
Qty: 30 TABLET | Refills: 0 | Status: SHIPPED | OUTPATIENT
Start: 2019-06-19 | End: 2019-07-01

## 2019-06-19 NOTE — TELEPHONE ENCOUNTER
Patient due for labs, has appt with Dr. Grady on 7/1/19.  Sent 30 day supply to get him through to appt.  Prescription refilled per RN Medication Refill Policy..................Iwona Srinivasan 6/19/2019 1:44 PM

## 2019-07-01 ENCOUNTER — OFFICE VISIT (OUTPATIENT)
Dept: PEDIATRICS | Facility: OTHER | Age: 69
End: 2019-07-01
Attending: INTERNAL MEDICINE
Payer: COMMERCIAL

## 2019-07-01 VITALS
DIASTOLIC BLOOD PRESSURE: 82 MMHG | HEIGHT: 69 IN | HEART RATE: 78 BPM | BODY MASS INDEX: 27.58 KG/M2 | SYSTOLIC BLOOD PRESSURE: 130 MMHG | WEIGHT: 186.2 LBS | TEMPERATURE: 97.8 F | RESPIRATION RATE: 16 BRPM

## 2019-07-01 DIAGNOSIS — E78.2 MIXED HYPERLIPIDEMIA: ICD-10-CM

## 2019-07-01 DIAGNOSIS — I10 BENIGN ESSENTIAL HYPERTENSION: ICD-10-CM

## 2019-07-01 DIAGNOSIS — Z76.89 ENCOUNTER TO ESTABLISH CARE: Primary | ICD-10-CM

## 2019-07-01 DIAGNOSIS — Z23 NEED FOR VACCINATION: ICD-10-CM

## 2019-07-01 DIAGNOSIS — Z12.5 SPECIAL SCREENING FOR MALIGNANT NEOPLASM OF PROSTATE: ICD-10-CM

## 2019-07-01 DIAGNOSIS — I48.91 ATRIAL FIBRILLATION, UNSPECIFIED TYPE (H): ICD-10-CM

## 2019-07-01 DIAGNOSIS — I49.9 IRREGULAR HEART BEAT: ICD-10-CM

## 2019-07-01 LAB
ANION GAP SERPL CALCULATED.3IONS-SCNC: 7 MMOL/L (ref 3–14)
BUN SERPL-MCNC: 17 MG/DL (ref 7–25)
CALCIUM SERPL-MCNC: 9.9 MG/DL (ref 8.6–10.3)
CHLORIDE SERPL-SCNC: 99 MMOL/L (ref 98–107)
CHOLEST SERPL-MCNC: 201 MG/DL
CO2 SERPL-SCNC: 26 MMOL/L (ref 21–31)
CREAT SERPL-MCNC: 0.83 MG/DL (ref 0.7–1.3)
GFR SERPL CREATININE-BSD FRML MDRD: >90 ML/MIN/{1.73_M2}
GLUCOSE SERPL-MCNC: 117 MG/DL (ref 70–105)
HDLC SERPL-MCNC: 36 MG/DL (ref 23–92)
LDLC SERPL CALC-MCNC: 127 MG/DL
NONHDLC SERPL-MCNC: 165 MG/DL
POTASSIUM SERPL-SCNC: 4.2 MMOL/L (ref 3.5–5.1)
PSA SERPL-ACNC: 0.47 NG/ML
SODIUM SERPL-SCNC: 132 MMOL/L (ref 134–144)
TRIGL SERPL-MCNC: 190 MG/DL

## 2019-07-01 PROCEDURE — 36415 COLL VENOUS BLD VENIPUNCTURE: CPT | Mod: ZL | Performed by: INTERNAL MEDICINE

## 2019-07-01 PROCEDURE — G0103 PSA SCREENING: HCPCS | Mod: ZL | Performed by: INTERNAL MEDICINE

## 2019-07-01 PROCEDURE — 96372 THER/PROPH/DIAG INJ SC/IM: CPT

## 2019-07-01 PROCEDURE — 93005 ELECTROCARDIOGRAM TRACING: CPT | Performed by: INTERNAL MEDICINE

## 2019-07-01 PROCEDURE — 93010 ELECTROCARDIOGRAM REPORT: CPT | Performed by: INTERNAL MEDICINE

## 2019-07-01 PROCEDURE — 80048 BASIC METABOLIC PNL TOTAL CA: CPT | Mod: ZL | Performed by: INTERNAL MEDICINE

## 2019-07-01 PROCEDURE — G0009 ADMIN PNEUMOCOCCAL VACCINE: HCPCS

## 2019-07-01 PROCEDURE — 90732 PPSV23 VACC 2 YRS+ SUBQ/IM: CPT

## 2019-07-01 PROCEDURE — G0463 HOSPITAL OUTPT CLINIC VISIT: HCPCS | Mod: 25

## 2019-07-01 PROCEDURE — 99397 PER PM REEVAL EST PAT 65+ YR: CPT | Mod: GY | Performed by: INTERNAL MEDICINE

## 2019-07-01 PROCEDURE — 80061 LIPID PANEL: CPT | Mod: ZL | Performed by: INTERNAL MEDICINE

## 2019-07-01 PROCEDURE — G0009 ADMIN PNEUMOCOCCAL VACCINE: HCPCS | Performed by: INTERNAL MEDICINE

## 2019-07-01 RX ORDER — HYDROCHLOROTHIAZIDE 25 MG/1
25 TABLET ORAL DAILY
Qty: 90 TABLET | Refills: 3 | Status: SHIPPED | OUTPATIENT
Start: 2019-07-01 | End: 2020-01-01

## 2019-07-01 RX ORDER — ATENOLOL 50 MG/1
50 TABLET ORAL DAILY
Qty: 90 TABLET | Refills: 3 | Status: SHIPPED | OUTPATIENT
Start: 2019-07-01 | End: 2020-01-01

## 2019-07-01 RX ORDER — ATORVASTATIN CALCIUM 10 MG/1
10 TABLET, FILM COATED ORAL DAILY
Qty: 90 TABLET | Refills: 3 | Status: SHIPPED | OUTPATIENT
Start: 2019-07-01 | End: 2020-01-01

## 2019-07-01 ASSESSMENT — MIFFLIN-ST. JEOR: SCORE: 1614.98

## 2019-07-01 ASSESSMENT — PAIN SCALES - GENERAL: PAINLEVEL: NO PAIN (0)

## 2019-07-01 NOTE — NURSING NOTE
Patient presents to clinic for PX and medication review.  Ivonne Staley LPN ....................  7/1/2019   8:58 AM      No LMP for male patient.  Medication Reconciliation: complete    Ivonne Staley LPN ....................  7/1/2019   8:58 AM

## 2019-07-01 NOTE — LETTER
July 1, 2019      Babar Laboy  [address]   [facility] [address]        Dear ,    We are writing to inform you of your test results.    Cholesterol is worse, glad you're starting the simvastatin.  Glucose is a little high, could be a sign of pre-diabetes.  Kidney function is normal.    Signed, Mario Grady MD  Internal Medicine & Pediatrics      Resulted Orders   PSA Screen GH   Result Value Ref Range    PSA Screen 0.474 <3.100 ng/mL      Comment:      The DXI Access PSAS WHO assay is a two site immunoenzymatic assay. Assay   values obtained with different assay methods cannot be used interchangeably   due to differences in assay methods and reagent specificity.     Lipid Profile   Result Value Ref Range    Cholesterol 201 (H) <200 mg/dL    Triglycerides 190 (H) <150 mg/dL      Comment:      Borderline high:  150-199 mg/dl  High:             200-499 mg/dl  Very high:       >499 mg/dl      HDL Cholesterol 36 23 - 92 mg/dL    LDL Cholesterol Calculated 127 (H) <100 mg/dL      Comment:      Above desirable:  100-129 mg/dl  Borderline High:  130-159 mg/dL  High:             160-189 mg/dL  Very high:       >189 mg/dl      Non HDL Cholesterol 165 (H) <130 mg/dL      Comment:      Above Desirable:  130-159 mg/dl  Borderline high:  160-189 mg/dl  High:             190-219 mg/dl  Very high:       >219 mg/dl     Basic metabolic panel   Result Value Ref Range    Sodium 132 (L) 134 - 144 mmol/L    Potassium 4.2 3.5 - 5.1 mmol/L    Chloride 99 98 - 107 mmol/L    Carbon Dioxide 26 21 - 31 mmol/L    Anion Gap 7 3 - 14 mmol/L    Glucose 117 (H) 70 - 105 mg/dL    Urea Nitrogen 17 7 - 25 mg/dL    Creatinine 0.83 0.70 - 1.30 mg/dL    GFR Estimate >90 >60 mL/min/[1.73_m2]    GFR Estimate If Black >90 >60 mL/min/[1.73_m2]    Calcium 9.9 8.6 - 10.3 mg/dL       If you have any questions or concerns, please call the clinic at the number listed above.       Sincerely,        Mario Grady MD

## 2019-07-01 NOTE — NURSING NOTE
Chief Complaint   Patient presents with     Physical     Prior to injection, verified patient identity using patient's name and date of birth.  Due to injection administration, patient instructed to remain in clinic for 15 minutes  afterwards, and to report any adverse reaction to me immediately.    pneumovax    Drug Amount Wasted:  None.  Vial/Syringe: Single dose vial  Expiration Date:  09/26/2020    Medication Reconciliation: completed   Yarelis Tom LPN  7/1/2019 9:49 AM

## 2019-07-01 NOTE — PATIENT INSTRUCTIONS
-- Continue aspirin    -- Start atorvastatin (cholesterol)   -- Start Eliquis (afib)     -- Tetanus (Tdap) and shingles at pharmacy or nurse only   -- Pneumonia vaccine today    The 10-year ASCVD risk score ([place] PEREZ Jr., et al., 2013) is: 25%    Values used to calculate the score:      Age: 68 years      Sex: Male      Is Non- : No      Diabetic: No      Tobacco smoker: Yes      Systolic Blood Pressure: 130 mmHg      Is BP treated: Yes      HDL Cholesterol: 36 mg/dL      Total Cholesterol: 193 mg/dL

## 2019-07-01 NOTE — PROGRESS NOTES
Subjective  Babar Laboy is a 68 year old male who presents for [facility] primary care.  Previous physician was Dr. Howard.  He has a history of hypertension which has been well controlled on atenolol, hydrochlorothiazide.  He continues on aspirin for prophylaxis.  He is been feeling well.  No exertional symptoms.  Gets up to urinate once at night.  No blood in stool.  Colonoscopies are up-to-date.  He continues to smoke, does not want to quit.    Review of Systems:  Skin: Negative  Eyes: Negative  Ears/Nose/Throat: Negative  Respiratory: Negative  Cardiovascular: Negative  Gastrointestinal: Negative  Genitourinary: Negative  Musculoskeletal: Negative  Neurologic: Negative  Psychiatric: Negative  Hematologic/Lymphatic/Immunologic: Negative  Endocrine: Negative        Problem List/PMH: reviewed in EMR, and made relevant updates today.  Medications: reviewed in EMR, and made relevant updates today.  Allergies: reviewed in EMR, and made relevant updates today.    Social Hx:  Social History     Tobacco Use     Smoking status: Current Every Day Smoker     Packs/day: 0.50     Years: 42.00     Pack years: 21.00     Types: Cigarettes     Smokeless tobacco: Never Used   Substance Use Topics     Alcohol use: Yes     Comment: Alcoholic Drinks/day: About 6 beers daily, more on the weekend.     Drug use: No     Social History     Social History Narrative    .  Two sons living at home.  He had a daughter in her early 20's who was killed in a motor vehicle accident in 2004.      Wife works outside the home as well.      Patient is now retired from [employer].    **pre upload 01/10/2013**     I reviewed social history and made relevant updates today.    Family Hx:   Family History   Problem Relation Age of Onset     Hypertension Father      Heart Disease Father      Myocardial Infarction Father      Cerebrovascular Disease Father      Hypertension Other         Hypertension,Multiple family members      "Pancreatic Cancer Brother      Colon Cancer No family hx of      Prostate Cancer No family hx of        Objective  Vitals: reviewed in EMR.  /82 (BP Location: Right arm, Patient Position: Sitting, Cuff Size: Adult Large)   Pulse 78   Temp 97.8  F (36.6  C) (Tympanic)   Resp 16   Ht 1.753 m (5' 9\")   Wt 84.5 kg (186 lb 3.2 oz)   BMI 27.50 kg/m      Gen: Pleasant male, NAD.  HEENT: MMM, no OP erythema.   Neck: Supple, no JVD, no bruits.  CV: Irregularly irregular, no murmur  Pulm: CTAB no w/r/r  Neuro: Grossly intact  Msk: No lower extremity edema.  Skin: No concerning lesions.  Psychiatric: Normal affect and insight. Does not appear anxious or depressed.    Component      Latest Ref Rng & Units 5/15/2018   WBC      4.0 - 11.0 10e9/L 6.7   RBC Count      4.4 - 5.9 10e12/L 4.88   Hemoglobin      13.3 - 17.7 g/dL 15.5   Hematocrit      40.0 - 53.0 % 43.7   MCV      78 - 100 fl 90   MCH      26.5 - 33.0 pg 31.8   MCHC      31.5 - 36.5 g/dL 35.5   RDW      10.0 - 15.0 % 13.1   Platelet Count      150 - 450 10e9/L 301   Diff Method       Automated Method   % Neutrophils      % 53.7   % Lymphocytes      % 32.2   % Monocytes      % 10.1   % Eosinophils      % 2.5   % Basophils      % 0.9   % Immature Granulocytes      % 0.6   Absolute Neutrophil      1.6 - 8.3 10e9/L 3.6   Absolute Lymphocytes      0.8 - 5.3 10e9/L 2.2   Absolute Monocytes      0.0 - 1.3 10e9/L 0.7   Absolute Eosinophils      0.0 - 0.7 10e9/L 0.2   Absolute Basophils      0.0 - 0.2 10e9/L 0.1   Abs Immature Granulocytes      0 - 0.4 10e9/L 0.0   Sodium      134 - 144 mmol/L 135   Potassium      3.5 - 5.1 mmol/L 4.2   Chloride      98 - 107 mmol/L 99   Carbon Dioxide      21 - 31 mmol/L 27   Anion Gap      3 - 14 mmol/L 9   Glucose      70 - 105 mg/dL 95   Urea Nitrogen      7 - 25 mg/dL 18   Creatinine      0.70 - 1.30 mg/dL 0.86   GFR Estimate      >60 mL/min/1.7m2 89   GFR Estimate If Black      >60 mL/min/1.7m2 >90   Calcium      8.6 - 10.3 " mg/dL 10.3   Bilirubin Total      0.3 - 1.0 mg/dL 0.6   Albumin      3.5 - 5.7 g/dL 4.8   Protein Total      6.4 - 8.9 g/dL 7.9   Alkaline Phosphatase      34 - 104 U/L 81   ALT      7 - 52 U/L 22   AST      13 - 39 U/L 19   Cholesterol      <200 mg/dL 193   Triglycerides      <150 mg/dL 187 (H)   HDL Cholesterol      23 - 92 mg/dL 36   LDL Cholesterol Calculated      <100 mg/dL 120 (H)   Non HDL Cholesterol      <130 mg/dL 157 (H)     Result Information     Status: Final result (Resulted: 7/1/2019) Provider Status: Open   Narrative     EKG Interpretation:   Rhythm: Irregularly irregular  Rate: 78  Axis: Normal  Conduction: Normal  QRS: Normal  ST Segments: Normal  T-wave: Normal  Chambers: Normal  PACs Present: No  PVCs Present: No    Impression:   Abnormal EKG.  Atrial fibrillation. Compared to 1/15/13: afib is new.    Signed, Mario Grady MD  Internal Medicine & Pediatrics  7/1/2019  10:47 AM       Assessment    ICD-10-CM    1. Encounter to establish care Z76.89    2. Benign essential hypertension I10 hydrochlorothiazide (HYDRODIURIL) 25 MG tablet     atenolol (TENORMIN) 50 MG tablet     Basic metabolic panel     Basic metabolic panel   3. Mixed hyperlipidemia E78.2 Lipid Profile     atorvastatin (LIPITOR) 10 MG tablet     Lipid Profile   4. Special screening for malignant neoplasm of prostate Z12.5 PSA Screen GH     PSA Screen GH   5. Need for vaccination Z23 Pneumococcal vaccine 23 valent PPSV23  (Pneumovax) [70440]   6. Irregular heart beat I49.9 EKG 12-lead, tracing only   7. Atrial fibrillation, unspecified type (H) I48.91 apixaban ANTICOAGULANT (ELIQUIS) 5 MG tablet     Echocardiogram Complete     Orders Placed This Encounter   Procedures     Pneumococcal vaccine 23 valent PPSV23  (Pneumovax) [[id number]]     Basic metabolic panel     Lipid Profile     PSA Screen GH     EKG 12-lead, tracing only     Echocardiogram Complete       We had a lengthy discussion today with regards to atrial fibrillation including  pathophysiology, expected clinical course, possible complications.  Ultimately we decided on a start of Eliquis for risk reduction given his CHADS2-VASc score of 3 (age, hypertension, vascular disease).    Recommended tobacco cessation, the patient declined.    Plan   -- Expected clinical course discussed   -- Medications and their side effects discussed  Patient Instructions    -- Continue aspirin    -- Start atorvastatin (cholesterol)   -- Start Eliquis (afib)     -- Tetanus (Tdap) and shingles at pharmacy or nurse only   -- Pneumonia vaccine today    The 10-year ASCVD risk score ([place] PEREZ Jr., et al., 2013) is: 25%    Values used to calculate the score:      Age: 68 years      Sex: Male      Is Non- : No      Diabetic: No      Tobacco smoker: Yes      Systolic Blood Pressure: 130 mmHg      Is BP treated: Yes      HDL Cholesterol: 36 mg/dL      Total Cholesterol: 193 mg/dL      Signed, Mario Grady MD  Internal Medicine & Pediatrics

## 2019-07-10 ENCOUNTER — HOSPITAL ENCOUNTER (OUTPATIENT)
Dept: CARDIOLOGY | Facility: OTHER | Age: 69
Discharge: HOME | End: 2019-07-10
Attending: INTERNAL MEDICINE | Admitting: INTERNAL MEDICINE
Payer: MEDICARE

## 2019-07-10 DIAGNOSIS — I48.91 ATRIAL FIBRILLATION, UNSPECIFIED TYPE (H): ICD-10-CM

## 2019-07-10 PROCEDURE — 93306 TTE W/DOPPLER COMPLETE: CPT

## 2019-07-10 PROCEDURE — 93306 TTE W/DOPPLER COMPLETE: CPT | Mod: 26 | Performed by: INTERNAL MEDICINE

## 2019-07-17 ENCOUNTER — TELEPHONE (OUTPATIENT)
Dept: CARDIAC REHAB | Facility: OTHER | Age: 69
End: 2019-07-17

## 2019-07-17 NOTE — TELEPHONE ENCOUNTER
Called to remind patient of stress test and review instructions. Verified .  Instructed in no caffeine, no smoking, light early breakfast and to take his medications as usual. He was told to check in at diagnostics a few minutes before test. Patient verbalized understanding.

## 2019-07-18 ENCOUNTER — TELEPHONE (OUTPATIENT)
Dept: PEDIATRICS | Facility: OTHER | Age: 69
End: 2019-07-18

## 2019-07-18 ENCOUNTER — HOSPITAL ENCOUNTER (OUTPATIENT)
Dept: NUCLEAR MEDICINE | Facility: OTHER | Age: 69
Discharge: HOME | End: 2019-07-18
Attending: INTERNAL MEDICINE | Admitting: INTERNAL MEDICINE
Payer: MEDICARE

## 2019-07-18 ENCOUNTER — HOSPITAL ENCOUNTER (OUTPATIENT)
Dept: NUCLEAR MEDICINE | Facility: OTHER | Age: 69
End: 2019-07-18
Attending: INTERNAL MEDICINE
Payer: MEDICARE

## 2019-07-18 DIAGNOSIS — I48.91 ATRIAL FIBRILLATION, UNSPECIFIED TYPE (H): ICD-10-CM

## 2019-07-18 DIAGNOSIS — I48.0 PAROXYSMAL ATRIAL FIBRILLATION (H): ICD-10-CM

## 2019-07-18 DIAGNOSIS — R94.39 ABNORMAL STRESS TEST: Primary | ICD-10-CM

## 2019-07-18 PROCEDURE — 78452 HT MUSCLE IMAGE SPECT MULT: CPT

## 2019-07-18 PROCEDURE — 34300033 ZZH RX 343: Performed by: INTERNAL MEDICINE

## 2019-07-18 PROCEDURE — A9500 TC99M SESTAMIBI: HCPCS | Performed by: INTERNAL MEDICINE

## 2019-07-18 PROCEDURE — 93016 CV STRESS TEST SUPVJ ONLY: CPT | Performed by: INTERNAL MEDICINE

## 2019-07-18 PROCEDURE — 93018 CV STRESS TEST I&R ONLY: CPT | Performed by: INTERNAL MEDICINE

## 2019-07-18 PROCEDURE — 93017 CV STRESS TEST TRACING ONLY: CPT

## 2019-07-18 PROCEDURE — 25000128 H RX IP 250 OP 636: Performed by: INTERNAL MEDICINE

## 2019-07-18 RX ORDER — AMINOPHYLLINE 25 MG/ML
50 INJECTION, SOLUTION INTRAVENOUS 2 TIMES DAILY PRN
Status: DISCONTINUED | OUTPATIENT
Start: 2019-07-18 | End: 2019-07-19 | Stop reason: HOSPADM

## 2019-07-18 RX ORDER — REGADENOSON 0.08 MG/ML
0.4 INJECTION, SOLUTION INTRAVENOUS ONCE
Status: COMPLETED | OUTPATIENT
Start: 2019-07-18 | End: 2019-07-18

## 2019-07-18 RX ADMIN — KIT FOR THE PREPARATION OF TECHNETIUM TC99M SESTAMIBI 8.28 MILLICURIE: 1 INJECTION, POWDER, LYOPHILIZED, FOR SOLUTION PARENTERAL at 11:00

## 2019-07-18 RX ADMIN — REGADENOSON 0.4 MG: 0.08 INJECTION, SOLUTION INTRAVENOUS at 13:02

## 2019-07-18 RX ADMIN — KIT FOR THE PREPARATION OF TECHNETIUM TC99M SESTAMIBI 31.6 MILLICURIE: 1 INJECTION, POWDER, LYOPHILIZED, FOR SOLUTION PARENTERAL at 13:00

## 2019-07-18 NOTE — PROGRESS NOTES
10:45  The patient arrived for a Lexiscan Cardiolite stress test.  The procedure, risks, and benefits were discussed with the patient ,and the consent was signed.  A saline lock was started,and the Cardiolite was injected by x-ray.  The patient was taken to the waiting area, to await resting images at 11:35.  12:40: The patient returned from x-ray and was prepped for the stress test.    arrived, and the patient was administered the Lexiscan per procedure.  The patient tolerated the procedure.  He was given a snack and taken to x-ray in stable condition for stress images.  The saline lock will be removed by x-ray for proper disposal.  The patient was instructed that the ordering MD will call with results in one to two days. Please see the chart for the complete test results.

## 2019-07-18 NOTE — TELEPHONE ENCOUNTER
I called and spoke with Mr. Laboy. Relayed positive stress test.       ICD-10-CM    1. Abnormal stress test R94.39 CARDIOLOGY EVAL ADULT REFERRAL   2. Paroxysmal atrial fibrillation (H) I48.0 CARDIOLOGY EVAL ADULT REFERRAL      No orders of the defined types were placed in this encounter.    Orders Placed This Encounter   Procedures     CARDIOLOGY EVAL ADULT REFERRAL     Signed, Mraio Grady MD  Internal Medicine & Pediatrics

## 2019-08-07 PROBLEM — Z71.6 TOBACCO ABUSE COUNSELING: Status: ACTIVE | Noted: 2019-08-07

## 2019-08-07 PROBLEM — Z72.0 TOBACCO ABUSE: Status: ACTIVE | Noted: 2019-08-07

## 2019-08-07 PROBLEM — Z79.01 ON CONTINUOUS ORAL ANTICOAGULATION: Status: ACTIVE | Noted: 2019-08-07

## 2019-08-07 PROBLEM — I48.0 PAROXYSMAL ATRIAL FIBRILLATION (H): Status: ACTIVE | Noted: 2019-08-07

## 2019-08-07 PROBLEM — I51.7 ASYMMETRIC SEPTAL HYPERTROPHY: Status: ACTIVE | Noted: 2019-08-07

## 2019-08-07 PROBLEM — I48.91 NEW ONSET A-FIB (H): Status: ACTIVE | Noted: 2019-08-07

## 2019-08-07 PROBLEM — I71.21 ASCENDING AORTIC ANEURYSM (H): Status: ACTIVE | Noted: 2019-08-07

## 2019-08-07 PROBLEM — R94.39 ABNORMAL STRESS TEST: Status: ACTIVE | Noted: 2019-08-07

## 2019-08-07 NOTE — PROGRESS NOTES
[facility]ARE   CARDIOLOGY CONSULT     Babar Laboy   1950  [id number]    Mario Grady     Chief Complaint   Patient presents with     New Patient          HPI:   Mr. Laboy is a 69-year-old gentleman who is being seen by cardiology for new onset atrial fibrillation on 7/1/2019.  He also has a history of hypertension, hyperlipidemia, stress test on 7/18/2019 without symptoms, tobacco abuse with counseling, ascending aortic aneurysm at 4.4 cm, asymmetric septal hypertrophy, on Eliquis, and concerns for NESSA.    Babar states he had previously seen Dr. Howard.  Dr. Howard has since retired and was seen by Dr. Grady.  As part of his new patient work-up, he had an EKG.  His EKG on 7/1/2019 showed new onset atrial fibrillation.  He was appropriately started on Eliquis 5 mg twice a day and continued on atenolol 50 mg daily.  He was subsequently referred to cardiology.  With atrial fibrillation, he has denied palpitations, fluttering his chest, fatigue, and shortness of breath.  Essentially, he has had no symptoms to suggest A. fib.    He was set up for an echocardiogram.  An echocardiogram on 7/10/2019 showed an EF of 55% to 60% with an ascending aortic aneurysm at 4.4 cm.  There was thickening of the basal septum.  He has had elevated blood pressure in the past.  I suspect that this aneurysm is secondary to hypertension.  This was discussed today.  It was suggested that he be on more aggressive antihypertensive medications.    He had a stress test on 7/18/2019.  He has denied symptoms which include chest tightness, chest discomfort, or chest pain.  He essentially has had no anginal symptoms.  He denies shortness of breath or dyspnea.  Patient is lacking symptoms, additional treatment was not pursued.  If the were to have concerning symptoms in the future, would consider angiogram at that time.  His risk factors include hyperlipidemia, hypertension, tobacco abuse, obesity, and sedentary  lifestyle.    IMAGING RESULTS:   Ultrasound for abdominal aortic aneurysm on 5/17/2018.  Aortic dimensions:  Maximal proximal diameter:  2.7 cm.  Maximal diameter in the midportion: 2.3 cm.  Maximal distal diameter: 2.0 cm.  Right common iliac artery maximum diameter: 1.2 cm.  Left common iliac artery maximum diameter: 1.0 cm.                                      Impression:  1.  Minimal atherosclerotic plaque. No aneurysm.    Echocardiogram 7/10/2019.  Global and regional left and right ventricular function are normal with an  LVEF of 55-60%.  No significant valvular abnormalities were noted.  Dilated aortic root and proximal ascending aorta measuring 4.4 cm (2.2 cm/m2)  and 3.8 cm (1.9 cm/m2) respectively. Consider CTA or MRA of the thoracic aorta  to better assess aortic dimensions.  Previous study not available for comparison.    Stress test on 7/18/2019.  IMPRESSION: Equivocal small reversible perfusion deficit within the  inferior apical myocardium.    ALLERGIES:   No Known Allergies     PAST MEDICAL HISTORY:   Past Medical History:   Diagnosis Date     Encounter for general adult medical examination without abnormal findings     No Comments Provided     Essential (primary) hypertension     No Comments Provided     Other microscopic hematuria     No Comments Provided     Other problems related to lifestyle     4-5 beers/day     Residual hemorrhoidal skin tags     12/05,noted on colonoscopy     Rosacea     12/1/2011     Tobacco use     No Comments Provided     Unilateral inguinal hernia without obstruction or gangrene     12/1/2011        PAST SURGICAL HISTORY:   Past Surgical History:   Procedure Laterality Date     COLONOSCOPY      12/05,F/U 2015     COLONOSCOPY  04/25/2016 4/25/16,F/U 2021 tubular adenomas     OTHER SURGICAL HISTORY      [id number],OTHER     OTHER SURGICAL HISTORY      [id number],OTHER     OTHER SURGICAL HISTORY      1/29/13,,HERNIA REPAIR,Right,RIH with mesh     RELEASE CARPAL TUNNEL       2003,right        FAMILY HISTORY:   Family History   Problem Relation Age of Onset     Hypertension Father      Heart Disease Father      Myocardial Infarction Father      Cerebrovascular Disease Father      Hypertension Other         Hypertension,Multiple family members     Pancreatic Cancer Brother      Colon Cancer No family hx of      Prostate Cancer No family hx of         SOCIAL HISTORY:   Social History     Socioeconomic History     Marital status:      Spouse name: Not on file     Number of children: Not on file     Years of education: Not on file     Highest education level: Not on file   Occupational History     Not on file   Social Needs     Financial resource strain: Not on file     Food insecurity:     Worry: Not on file     Inability: Not on file     Transportation needs:     Medical: Not on file     Non-medical: Not on file   Tobacco Use     Smoking status: Current Every Day Smoker     Packs/day: 0.50     Years: 42.00     Pack years: 21.00     Types: Cigarettes     Smokeless tobacco: Never Used   Substance and Sexual Activity     Alcohol use: Yes     Comment: Alcoholic Drinks/day: About 6 beers daily, more on the weekend.     Drug use: No     Sexual activity: Yes     Partners: Female   Lifestyle     Physical activity:     Days per week: Not on file     Minutes per session: Not on file     Stress: Not on file   Relationships     Social connections:     Talks on phone: Not on file     Gets together: Not on file     Attends [employer] service: Not on file     Active member of club or organization: Not on file     Attends meetings of clubs or organizations: Not on file     Relationship status: Not on file     Intimate partner violence:     Fear of current or ex partner: Not on file     Emotionally abused: Not on file     Physically abused: Not on file     Forced sexual activity: Not on file   Other Topics Concern     Parent/sibling w/ CABG, MI or angioplasty before 65F 55M? Not Asked   Social  History Narrative    .  Two sons living at home.  He had a daughter in her early 20's who was killed in a motor vehicle accident in 2004.      Wife works outside the home as well.      Patient is now retired from [employer].    **pre upload 01/10/2013**         CURRENT MEDICATIONS:   Prior to Admission medications    Medication Sig Start Date End Date Taking? Authorizing Provider   apixaban ANTICOAGULANT (ELIQUIS) 5 MG tablet Take 1 tablet (5 mg) by mouth 2 times daily 7/1/19   Mario Grady MD   aspirin 81 MG tablet Take 81 mg by mouth daily    Reported, Patient   atenolol (TENORMIN) 50 MG tablet Take 1 tablet (50 mg) by mouth daily Dose increase 7/1/19   Mario Grady MD   atorvastatin (LIPITOR) 10 MG tablet Take 1 tablet (10 mg) by mouth daily 7/1/19   Mario Grady MD   hydrochlorothiazide (HYDRODIURIL) 25 MG tablet Take 1 tablet (25 mg) by mouth daily 7/1/19   Mario Grady MD          ROS:   CONSTITUTIONAL: No weight loss, fever, chills, weakness or fatigue.   HEENT: Eyes: No visual changes. Ears, Nose, Throat: No hearing loss, congestion or difficulty swallowing.   CARDIOVASCULAR: No chest pain, chest pressure or chest discomfort. No palpitations or lower extremity edema.   RESPIRATORY: No shortness of breath, dyspnea upon exertion, cough or sputum production.   GASTROINTESTINAL: No abdominal pain. No anorexia, nausea, vomiting or diarrhea.   NEUROLOGICAL: No headache, lightheadedness, dizziness, syncope, ataxia or weakness.   HEMATOLOGIC: No anemia, bleeding or bruising.   PSYCHIATRIC: No history of depression or anxiety.   ENDOCRINOLOGIC: No reports of sweating, cold or heat intolerance. No polyuria or polydipsia.   SKIN: No abnormal rashes or itching.       PHYSICAL EXAM:   GENERAL: The patient is a well-developed, well-nourished, in no apparent distress. Alert and oriented x3.   HEENT: Head is normocephalic and atraumatic. Eyes are symmetrical with  normal visual tracking.  HEART: Regular rate and rhythm, S1S2 present without murmur, rub or gallop.   LUNGS: Respirations regular and unlabored. Clear to auscultation.   GI: Abdomen is soft and nondistended.   EXTREMITIES: No peripheral edema present.   MUSCULOSKELETAL: No joint swelling.   NEUROLOGIC: Alert and oriented X3.    SKIN: No jaundice. No rashes or visible skin lesions present.     LAB RESULTS:   Office Visit on 07/01/2019   Component Date Value Ref Range Status     PSA Screen 07/01/2019 0.474  <3.100 ng/mL Final     Cholesterol 07/01/2019 201* <200 mg/dL Final     Triglycerides 07/01/2019 190* <150 mg/dL Final     HDL Cholesterol 07/01/2019 36  23 - 92 mg/dL Final     LDL Cholesterol Calculated 07/01/2019 127* <100 mg/dL Final     Non HDL Cholesterol 07/01/2019 165* <130 mg/dL Final     Sodium 07/01/2019 132* 134 - 144 mmol/L Final     Potassium 07/01/2019 4.2  3.5 - 5.1 mmol/L Final     Chloride 07/01/2019 99  98 - 107 mmol/L Final     Carbon Dioxide 07/01/2019 26  21 - 31 mmol/L Final     Anion Gap 07/01/2019 7  3 - 14 mmol/L Final     Glucose 07/01/2019 117* 70 - 105 mg/dL Final     Urea Nitrogen 07/01/2019 17  7 - 25 mg/dL Final     Creatinine 07/01/2019 0.83  0.70 - 1.30 mg/dL Final     GFR Estimate 07/01/2019 >90  >60 mL/min/[1.73_m2] Final     GFR Estimate If Black 07/01/2019 >90  >60 mL/min/[1.73_m2] Final     Calcium 07/01/2019 9.9  8.6 - 10.3 mg/dL Final            ASSESSMENT:       ICD-10-CM    1. Paroxysmal atrial fibrillation (H) I48.0 EKG 12-lead, tracing only (Same Day)     apixaban ANTICOAGULANT (ELIQUIS) 5 MG tablet   2. New onset a-fib 7/1/2019 I48.91 EKG 12-lead, tracing only (Same Day)     apixaban ANTICOAGULANT (ELIQUIS) 5 MG tablet   3. Benign essential hypertension I10 EKG 12-lead, tracing only (Same Day)     losartan (COZAAR) 25 MG tablet   4. Mixed hyperlipidemia E78.2    5. Abnormal stress test on 7/18/2019 R94.39    6. Tobacco abuse Z72.0    7. Tobacco abuse counseling Z71.6     8. Ascending aortic aneurysm (H) I71.2 Echocardiogram Complete   9. Asymmetric septal hypertrophy (H) I42.2 Echocardiogram Complete   10. On continuous oral anticoagulation Z79.01    11. NESSA (obstructive sleep apnea) G47.33 SLEEP EVALUATION & MANAGEMENT REFERRAL - [facility] [phone] (Age 13 and up)         PLAN:   1.  We had a long discussion about atrial fibrillation.  The heart model in the room was used to explain the process.  We discussed anticoagulation which would include DOAC's versus Coumadin and we also discussed calcium channel blockers, beta blockers, antiarrhythmics, cardioversion, and ablation.  All the information was somewhat overwhelming.  He has been completely asymptomatic denying palpitations, fatigue, dizziness, or lightheadedness.  His EKG today did not support atrial fibrillation.  For now, he plans to continue atenolol 50 mg daily and Eliquis 5 mg twice a day.  This will give him time to think about his options.  He is aware that atrial fibrillation not increase mortality.  He also understands that he needs to be careful with fall risk as this could increase his risk of bleeding, particularly in his head which could be a life-threatening situation.  2.  We also discussed his elevated blood pressure.  He is currently on hydrochlorothiazide 25 mg daily and atenolol 50 mg daily.  His pressure today is 160/106.  Usually, his blood pressure has been in the 130s.  He also has an ascending aortic aneurysm which I suspect is from uncontrolled hypertension.  It was suggested that we attempt more aggressive blood pressure management which he was agreeable.  3.  He will be started on losartan 25 mg daily.  4.  Related to his ascending aortic aneurysm, he will have a repeat echocardiogram in 1 year.  5.  His wife describes him as snoring at night.  He is known to fall asleep while watching TV in the evening or while doing Sudoku.  He understands that  sleep apnea can be a trigger for A. fib.  With appropriate treatment of sleep apnea, blood pressure,  increasing activity and working on weight loss, this has shown to improve management for atrial fibrillation.  6.  He will be seen in 3 months follow-up to readdress treatment with potential changes for A. fib.      Thank you for allowing me to participate in the care of your patient. Please do not hesitate to contact me if you have any questions.     Luis Christianson, DO

## 2019-08-08 ENCOUNTER — OFFICE VISIT (OUTPATIENT)
Dept: CARDIOLOGY | Facility: OTHER | Age: 69
End: 2019-08-08
Attending: INTERNAL MEDICINE
Payer: COMMERCIAL

## 2019-08-08 VITALS
TEMPERATURE: 97.9 F | BODY MASS INDEX: 27.3 KG/M2 | RESPIRATION RATE: 16 BRPM | HEIGHT: 69 IN | DIASTOLIC BLOOD PRESSURE: 106 MMHG | HEART RATE: 98 BPM | SYSTOLIC BLOOD PRESSURE: 160 MMHG | WEIGHT: 184.3 LBS

## 2019-08-08 DIAGNOSIS — Z79.01 ON CONTINUOUS ORAL ANTICOAGULATION: ICD-10-CM

## 2019-08-08 DIAGNOSIS — I10 BENIGN ESSENTIAL HYPERTENSION: Chronic | ICD-10-CM

## 2019-08-08 DIAGNOSIS — I71.21 ASCENDING AORTIC ANEURYSM (H): ICD-10-CM

## 2019-08-08 DIAGNOSIS — Z72.0 TOBACCO ABUSE: ICD-10-CM

## 2019-08-08 DIAGNOSIS — I51.7 ASYMMETRIC SEPTAL HYPERTROPHY: ICD-10-CM

## 2019-08-08 DIAGNOSIS — G47.33 OSA (OBSTRUCTIVE SLEEP APNEA): ICD-10-CM

## 2019-08-08 DIAGNOSIS — R94.39 ABNORMAL STRESS TEST: ICD-10-CM

## 2019-08-08 DIAGNOSIS — Z71.6 TOBACCO ABUSE COUNSELING: ICD-10-CM

## 2019-08-08 DIAGNOSIS — E78.2 MIXED HYPERLIPIDEMIA: ICD-10-CM

## 2019-08-08 DIAGNOSIS — I48.0 PAROXYSMAL ATRIAL FIBRILLATION (H): Primary | ICD-10-CM

## 2019-08-08 DIAGNOSIS — I48.91 NEW ONSET A-FIB (H): ICD-10-CM

## 2019-08-08 PROCEDURE — 99203 OFFICE O/P NEW LOW 30 MIN: CPT | Performed by: INTERNAL MEDICINE

## 2019-08-08 PROCEDURE — G0463 HOSPITAL OUTPT CLINIC VISIT: HCPCS

## 2019-08-08 PROCEDURE — 93005 ELECTROCARDIOGRAM TRACING: CPT | Performed by: INTERNAL MEDICINE

## 2019-08-08 PROCEDURE — 93010 ELECTROCARDIOGRAM REPORT: CPT | Performed by: INTERNAL MEDICINE

## 2019-08-08 RX ORDER — LOSARTAN POTASSIUM 25 MG/1
25 TABLET ORAL DAILY
Qty: 90 TABLET | Refills: 3 | Status: SHIPPED | OUTPATIENT
Start: 2019-08-08 | End: 2020-01-01

## 2019-08-08 ASSESSMENT — PAIN SCALES - GENERAL: PAINLEVEL: NO PAIN (0)

## 2019-08-08 ASSESSMENT — MIFFLIN-ST. JEOR: SCORE: 1601.36

## 2019-08-08 NOTE — NURSING NOTE
"Chief Complaint   Patient presents with     New Patient     Pt present to clinic today for a consult.  Initial BP (!) 160/106 (BP Location: Right arm, Patient Position: Sitting, Cuff Size: Adult Large)   Pulse 98   Temp 97.9  F (36.6  C) (Tympanic)   Resp 16   Ht 1.753 m (5' 9\")   Wt 83.6 kg (184 lb 4.8 oz)   BMI 27.22 kg/m   Estimated body mass index is 27.22 kg/m  as calculated from the following:    Height as of this encounter: 1.753 m (5' 9\").    Weight as of this encounter: 83.6 kg (184 lb 4.8 oz).  Medication Reconciliation: complete    Tavia Merritt LPN  "

## 2019-08-08 NOTE — PATIENT INSTRUCTIONS
You were seen by  Luis Christianson, DO      1. An echocardiogram has been ordered to be completed in 1 year. You will be called to schedule this. You will receive instructions for testing at that time.       2. You will be referred for a sleep study and evaluation here at [facility]. You will be called to schedule these appointments.     3. START taking Losartan 25 mg once daily.     4. CONTINUE taking Eliquis and Atenolol as prescribed.     5. No other changes.     You will follow up with [facility] in 3 months, sooner if needed.       Please call the cardiology office with problems, questions, or concerns at [phone].    If you experience chest pain, chest pressure, chest tightness, shortness of breath, fainting, lightheadedness, nausea, vomiting, or other concerning symptoms, please report to the Emergency Department or call 911. These symptoms may be emergent, and best treated in the Emergency Department.     Cardiology Nurses  DONYA Michel, RN  LATA Barry LPN  [facility] (Unit 3C)  [phone]

## 2019-10-21 ENCOUNTER — OFFICE VISIT (OUTPATIENT)
Dept: PULMONOLOGY | Facility: OTHER | Age: 69
End: 2019-10-21
Attending: INTERNAL MEDICINE
Payer: MEDICARE

## 2019-10-21 VITALS
HEART RATE: 65 BPM | OXYGEN SATURATION: 98 % | WEIGHT: 184.8 LBS | BODY MASS INDEX: 27.37 KG/M2 | SYSTOLIC BLOOD PRESSURE: 112 MMHG | DIASTOLIC BLOOD PRESSURE: 80 MMHG | HEIGHT: 69 IN

## 2019-10-21 DIAGNOSIS — R06.83 SNORING: Primary | ICD-10-CM

## 2019-10-21 DIAGNOSIS — G47.33 OSA (OBSTRUCTIVE SLEEP APNEA): ICD-10-CM

## 2019-10-21 PROCEDURE — G0463 HOSPITAL OUTPT CLINIC VISIT: HCPCS

## 2019-10-21 ASSESSMENT — MIFFLIN-ST. JEOR: SCORE: 1603.63

## 2019-10-21 NOTE — PROGRESS NOTES
"Sleep Medicine Note  Babar Laboy  October 21, 2019  [id number]    Chief Complaint: Atrial fibrillation with concern for significant sleep apnea    History of Present Illness: Babar Laboy is a 69 year old male presenting for above complaint.  He has a history of atrial fibrillation with a normal left ventricular ejection fraction.  He snores loudly.  He sleeps separate from his wife because they both snore and bother each other.  He feels a little bit tired during the daytime but does not feel overly tired.  He awakens feeling restored.  He does not have a dry mouth or headache in the morning.  He only uses the bathroom once during the night.  In addition to atrial fibrillation he has a history of hypertension.  Does not have any unusual behaviors.  No sleepwalking.  No restless limb symptoms.        Past Medical History:  Past Medical History:   Diagnosis Date     Encounter for general adult medical examination without abnormal findings     No Comments Provided     Essential (primary) hypertension     No Comments Provided     Other microscopic hematuria     No Comments Provided     Other problems related to lifestyle     4-5 beers/day     Residual hemorrhoidal skin tags     12/05,noted on colonoscopy     Rosacea     12/1/2011     Tobacco use     No Comments Provided     Unilateral inguinal hernia without obstruction or gangrene     12/1/2011       Medications:  Current Outpatient Medications   Medication     apixaban ANTICOAGULANT (ELIQUIS) 5 MG tablet     aspirin 81 MG tablet     atenolol (TENORMIN) 50 MG tablet     atorvastatin (LIPITOR) 10 MG tablet     hydrochlorothiazide (HYDRODIURIL) 25 MG tablet     losartan (COZAAR) 25 MG tablet     No current facility-administered medications for this visit.        Physical Exam:  /80 (BP Location: Right arm, Patient Position: Sitting, Cuff Size: Adult Large)   Pulse 65   Ht 5' 9\" (1.753 m)   Wt 184 lb 12.8 oz (83.8 kg)   SpO2 98%   BMI 27.29 kg/m    Neck " circumference 16 inches, pharynx is without erythema, dentition is intact, posterior crowding is noted, malampatti class IV.  No significant tonsillar tissue.  Lungs are clear no wheeze, rhonchi, crackles, or focal dullness.  Cardiovascular exam S1-S2, regular, no murmur or rub appreciated.    Assessment and Plan:  69 year old male presenting for snoring with high probability of sleep apnea.  He meets 6 out of 8 criteria on the stop bang screening tool.  He has atrial fibrillation which indicates a 50-50 risk of NESSA.  The diagnosis, pathophysiology, and treatment of obstructive sleep apnea was discussed with him.  He would prefer to do a home sleep apnea test if this is available.  I will put an order in for home sleep apnea test.  We discussed treatment with positive pressure therapy if significant sleep disordered breathing is shown to be present.    CC: [employer]

## 2019-10-21 NOTE — NURSING NOTE
Chief Complaint   Patient presents with     Sleep Apnea         Medication Reconciliation: complete    Mary Cartagena, LPN

## 2019-11-06 NOTE — PROGRESS NOTES
Cardiology Progress Note     Assessment & Plan   Babar Laboy is a 69 year old male who was originally seen on 8/8//2019 for new onset atrial fibrillation on 7/1/2019.    Evens states he had previously seen Dr. Howard. Dr. Howard has since retired and was seen by Dr. Grady. As part of his new patient work-up, he had an EKG. His EKG on 7/1/2019 showed new onset atrial fibrillation. He was appropriately started on Eliquis 5 mg twice a day and continued on atenolol 50 mg daily. He was subsequently referred to cardiology. With atrial fibrillation, he has denied palpitations, fluttering his chest, fatigue, or shortness of breath. Essentially, he has had no symptoms to suggest A. fib.     He was set up for an echocardiogram. An echocardiogram was completed on 7/10/2019 which showed an EF of 55% to 60% with an TAAA at 4.4 cm. There was thickening of the basal septum. He has had elevated blood pressure in the past proved on 11/7/2019 with changes to his medications. I suspect that this aneurysm is secondary to hypertension. This has been discussed previously.  His blood pressure has been controlled on losartan 25 mg daily, atenolol 50 mg daily, and hydrochlorothiazide 25 mg daily.     He had a stress test on 7/18/2019 which was read as having a small reversible perfusion defect in the inferior apical myocardium.  He has denied symptoms which include chest tightness, chest discomfort, or chest pain.  He essentially has had no anginal symptoms.  He denies shortness of breath or dyspnea.  Patient is lacking symptoms, additional treatment was not pursued.  If the were to have concerning symptoms in the future, would consider angiogram at that time.  His risk factors include hyperlipidemia, hypertension, tobacco abuse, obesity, and sedentary lifestyle.    11/7/2019:  He is here in follow-up for his blood pressure and atrial fibrillation.  He continues to deny symptoms which would correspond with atrial fibrillation.   He is tolerating Eliquis well but the cost is $90 a month.  It was suggested we provide him with the other names of the DOAC's to check and see if they are covered with his insurance.  At this point, he is adamant he does not want to go on Coumadin.  He has been taking aspirin but can be discontinued.  He is wondering how frequently he is in A. fib.  We discussed doing a ZIO Patch but this would not affect management.  It would only be for curiosity.  He plans to think about it.  Otherwise, he has no additional complaints.    Impression:  1.  Paroxysmal atrial fibrillation.  2.  New onset atrial fibrillation on 7/1/2019.  3.  Hypertension.  4.  Hyperlipidemia.  5.  Abnormal stress test on 7/18/2019 without cardiac symptoms.  6.  Tobacco abuse with counseling.  7.  TAAA at 4.4 cm on an echo from 7/10/2019.  A.  Asymmetric septal hypertrophy.  9.  On continuous anticoagulation.  10.  NESSA with referral placement on 8/8/2019.  He was seen on 10/20/2019 and was set up for home evaluation.    Plan:  1.  Stop aspirin 81 mg daily.  2.  Continue Eliquis 5 mg twice a day.  3.  He was given the names of the DOAC's in which he plans to check with insurance company to see if they are covered.  He will let us know if one of them is cheaper.  4.  To understand the burden of his atrial fibrillation, we discussed doing a ZIO Patch.  He understands it would not  but would be simply for curiosity.  He plans to think about it.  5.  No additional changes.  6.  Follow-up in 6 months or sooner with problems.    Luis Christianson        Physical Exam   Temp: 96.9  F (36.1  C) Temp src: Tympanic BP: 110/78 Pulse: 60   Resp: 18 SpO2: 99 %      Vitals:    11/07/19 1053   Weight: 83.5 kg (184 lb)     Vital Signs with Ranges  Temp:  [96.9  F (36.1  C)] 96.9  F (36.1  C)  Pulse:  [60] 60  Resp:  [18] 18  BP: (110)/(78) 110/78  SpO2:  [99 %] 99 %  ROS negative except that which was noted in the HPI.         Constitutional: awake,  alert, cooperative, no apparent distress, and appears stated age  Eyes: Lids and lashes normal, pupils equal, sclera clear, conjunctiva normal  ENT: Normocephalic, without obvious abnormality, atraumatic.  Respiratory: No increased work of breathing, good air exchange, clear to auscultation bilaterally, no crackles or wheezing  Cardiovascular: Normal apical impulse, regular rate and rhythm, normal S1 and S2, no S3 or S4, and no murmur noted  GI: No scars, normal bowel sounds, soft  Musculoskeletal: no lower extremity pitting edema present  Neurologic: Awake, alert, oriented to name, place and time.  Neuropsychiatric: General: normal, calm and normal eye contact    Medications         Data   No results found for this or any previous visit (from the past 24 hour(s)).  No results found for this or any previous visit (from the past 24 hour(s)).

## 2019-11-07 NOTE — NURSING NOTE
Immunization Documentation    Prior to Immunization administration, verified patients identity using patient's name and date of birth. Please see IMMUNIZATIONS  and order for additional information.  Patient / Parent instructed to remain in clinic for 15 minutes and report any adverse reaction to staff immediately.    Was entire vial of medication used? Yes  Vial/Syringe: Single dose vial    Brie Iniguez RN  11/7/2019   11:45 AM

## 2019-11-07 NOTE — NURSING NOTE
"Patient comes in for follow up on Afib.  Sneha Huber LPN ....................11/7/2019   10:57 AM  Chief Complaint   Patient presents with     Follow Up     tests       Initial /78 (BP Location: Right arm, Patient Position: Sitting, Cuff Size: Adult Large)   Pulse 60   Temp 96.9  F (36.1  C) (Tympanic)   Resp 18   Ht 1.753 m (5' 9\")   Wt 83.5 kg (184 lb)   SpO2 99%   BMI 27.17 kg/m   Estimated body mass index is 27.17 kg/m  as calculated from the following:    Height as of this encounter: 1.753 m (5' 9\").    Weight as of this encounter: 83.5 kg (184 lb).  Meds Reconciled: complete  Pt is on Aspirin  Pt is on a Statin  PHQ and/or AYANA reviewed. Pt referred to PCP/MH Provider as appropriate.    Sneha Huber LPN      "

## 2019-11-07 NOTE — PATIENT INSTRUCTIONS
You were seen by  Luis Christianson, DO       1. Stop Aspirin.     2. Consider Zio patch call if you would like to proceed with this.    3. DOAC's Xarelto, Eliquis, Pradaxa please let us know what is covered by you insurance.        You will follow up with [facility] in 6 months, sooner if needed.       Please call the cardiology office with problems, questions, or concerns at [phone].    If you experience chest pain, chest pressure, chest tightness, shortness of breath, fainting, lightheadedness, nausea, vomiting, or other concerning symptoms, please report to the Emergency Department or call 911. These symptoms may be emergent, and best treated in the Emergency Department.     Cardiology Nurses  JODI Crawford, LATA BROOKS LPN  [facility] (Unit 3C)  [phone]

## 2019-12-03 NOTE — ED AVS SNAPSHOT
[facility]  [address]  [place] [address]  Phone:  [phone]  Fax:  [phone]                                    Babar Laboy   MRN: [id number]    Department:  [facility]   Date of Visit:  12/3/2019           After Visit Summary Signature Page    I have received my discharge instructions, and my questions have been answered. I have discussed any challenges I see with this plan with the nurse or doctor.    ..........................................................................................................................................  Patient/Patient Representative Signature      ..........................................................................................................................................  Patient Representative Print Name and Relationship to Patient    ..................................................               ................................................  Date                                   Time    ..........................................................................................................................................  Reviewed by Signature/Title    ...................................................              ..............................................  Date                                               Time          22EPIC Rev 08/18

## 2019-12-03 NOTE — TELEPHONE ENCOUNTER
"Patient present to Unit 2 for work in with Dr. Grady for abdominal pain and was informed no available work in spots today.  Patient brought to room and triaged and due to ongoing abdominal pain for about 2 days patient transported to ER.    BP: 138/92 Pulse: 62 O2: 97% Temp: 98  States pain started on left side back area and is now in the front left side.    Tennille Kraus RN on 12/3/2019 at 11:56 AM      Additional Information    Negative: Passed out (i.e., fainted, collapsed and was not responding)    Negative: Shock suspected (e.g., cold/pale/clammy skin, too weak to stand, low BP, rapid pulse)    Negative: Sounds like a life-threatening emergency to the triager    Negative: Chest pain    Negative: Pain is mainly in upper abdomen (if needed ask: 'is it mainly above the belly button?')    Negative: SEVERE abdominal pain (e.g., excruciating)    Negative: Vomiting red blood or black (coffee ground) material    Negative: Bloody, black, or tarry bowel movements    Negative: Unable to urinate (or only a few drops) and bladder feels very full    Negative: Pain in scrotum persists > 1 hour    Constant abdominal pain lasting > 2 hours    Answer Assessment - Initial Assessment Questions  1. LOCATION: \"Where does it hurt?\"       Left side abdominal pain  2. RADIATION: \"Does the pain shoot anywhere else?\" (e.g., chest, back)      From back to front left side  3. ONSET: \"When did the pain begin?\" (Minutes, hours or days ago)       2 days ago  4. SUDDEN: \"Gradual or sudden onset?\"      sudden  5. PATTERN \"Does the pain come and go, or is it constant?\"     - If constant: \"Is it getting better, staying the same, or worsening?\"       (Note: Constant means the pain never goes away completely; most serious pain is constant and it progresses)      - If intermittent: \"How long does it last?\" \"Do you have pain now?\"      (Note: Intermittent means the pain goes away completely between bouts)      Constant, take Ibuprofen with " "relief  6. SEVERITY: \"How bad is the pain?\"  (e.g., Scale 1-10; mild, moderate, or severe)     - MILD (1-3): doesn't interfere with normal activities, abdomen soft and not tender to touch      - MODERATE (4-7): interferes with normal activities or awakens from sleep, tender to touch      - SEVERE (8-10): excruciating pain, doubled over, unable to do any normal activities        4-5/10  7. RECURRENT SYMPTOM: \"Have you ever had this type of abdominal pain before?\" If so, ask: \"When was the last time?\" and \"What happened that time?\"       denies  8. CAUSE: \"What do you think is causing the abdominal pain?\"      imsire  9. RELIEVING/AGGRAVATING FACTORS: \"What makes it better or worse?\" (e.g., movement, antacids, bowel movement)      Ibuprofen helps  10. OTHER SYMPTOMS: \"Has there been any vomiting, diarrhea, constipation, or urine problems?\"        denies    Protocols used: ABDOMINAL PAIN - MALE-A-OH      "

## 2019-12-03 NOTE — ED PROVIDER NOTES
"  History     Chief Complaint   Patient presents with     Flank Pain     HPI  Babar Laboy is a 69 year old male who presents with abdominal/flank/back pain. He reports it is located in his LUQ and radiates below left shoulder blade. Described as \"like a sore tooth\", rated 5/10. Denies chest pain, sob, dysuria, n/v, diarrhea, fevers. Hx of HTN, hyperlipidemia, smoker/ETOH.    Allergies:  No Known Allergies    Problem List:    Patient Active Problem List    Diagnosis Date Noted     NESSA (obstructive sleep apnea) 08/08/2019     Priority: Medium     Paroxysmal atrial fibrillation (H) 08/07/2019     Priority: Medium     New onset a-fib 7/1/2019 08/07/2019     Priority: Medium     Abnormal stress test on 7/18/2019 08/07/2019     Priority: Medium     Tobacco abuse 08/07/2019     Priority: Medium     Tobacco abuse counseling 08/07/2019     Priority: Medium     Ascending aortic aneurysm (H) 08/07/2019     Priority: Medium     Asymmetric septal hypertrophy (H) 08/07/2019     Priority: Medium     On continuous oral anticoagulation 08/07/2019     Priority: Medium     Benign essential hypertension 05/15/2018     Priority: Medium     H/O adenomatous polyp of colon 04/25/2016     Priority: Medium     Mixed hyperlipidemia 01/28/2016     Priority: Medium     Acne rosacea 12/01/2011     Priority: Medium        Past Medical History:    Past Medical History:   Diagnosis Date     Encounter for general adult medical examination without abnormal findings      Essential (primary) hypertension      Other microscopic hematuria      Other problems related to lifestyle      Residual hemorrhoidal skin tags      Rosacea      Tobacco use      Unilateral inguinal hernia without obstruction or gangrene        Past Surgical History:    Past Surgical History:   Procedure Laterality Date     COLONOSCOPY      12/05,F/U 2015     COLONOSCOPY  04/25/2016 4/25/16,F/U 2021 tubular adenomas     OTHER SURGICAL HISTORY      [id number],OTHER     OTHER SURGICAL " "HISTORY      [id number],OTHER     OTHER SURGICAL HISTORY      1/29/13,,HERNIA REPAIR,Right,RIH with mesh     RELEASE CARPAL TUNNEL      2003,right       Family History:    Family History   Problem Relation Age of Onset     Hypertension Father      Heart Disease Father      Myocardial Infarction Father      Cerebrovascular Disease Father      Hypertension Other         Hypertension,Multiple family members     Pancreatic Cancer Brother      Colon Cancer No family hx of      Prostate Cancer No family hx of        Social History:  Marital Status:   [2]  Social History     Tobacco Use     Smoking status: Current Every Day Smoker     Packs/day: 0.50     Years: 42.00     Pack years: 21.00     Types: Cigarettes     Smokeless tobacco: Never Used   Substance Use Topics     Alcohol use: Yes     Comment: Alcoholic Drinks/day: About 6 beers daily, more on the weekend.     Drug use: No        Medications:    apixaban ANTICOAGULANT (ELIQUIS) 5 MG tablet  atenolol (TENORMIN) 50 MG tablet  atorvastatin (LIPITOR) 10 MG tablet  hydrochlorothiazide (HYDRODIURIL) 25 MG tablet  losartan (COZAAR) 25 MG tablet          Review of Systems   Constitutional: Negative for chills and fever.   HENT: Negative for congestion.    Eyes: Negative for visual disturbance.   Respiratory: Negative for chest tightness and shortness of breath.    Cardiovascular: Negative for chest pain.   Gastrointestinal: Positive for abdominal pain. Negative for nausea and vomiting.   Genitourinary: Positive for flank pain. Negative for hematuria.   Musculoskeletal: Positive for back pain.   Skin: Negative for rash and wound.   Neurological: Negative for syncope.   Hematological: Negative for adenopathy. Does not bruise/bleed easily.   Psychiatric/Behavioral: Negative for confusion.       Physical Exam   BP: (!) 162/97  Pulse: 58  Heart Rate: 61  Temp: 98.3  F (36.8  C)  Resp: 21  Height: 175.3 cm (5' 9\")  Weight: 79.4 kg (175 lb)  SpO2: 96 %      Physical " Exam  Constitutional:       General: He is not in acute distress.     Appearance: He is well-developed. He is not diaphoretic.   HENT:      Head: Normocephalic and atraumatic.   Eyes:      General: No scleral icterus.     Extraocular Movements: Extraocular movements intact.      Conjunctiva/sclera: Conjunctivae normal.      Pupils: Pupils are equal, round, and reactive to light.   Neck:      Musculoskeletal: Neck supple.   Cardiovascular:      Rate and Rhythm: Normal rate and regular rhythm.   Pulmonary:      Effort: Pulmonary effort is normal.      Breath sounds: Normal breath sounds.   Abdominal:      Palpations: Abdomen is soft.      Tenderness: There is abdominal tenderness.      Comments: Minimal epigastric, LUQ tenderness   Musculoskeletal:         General: No deformity.   Lymphadenopathy:      Cervical: No cervical adenopathy.   Skin:     General: Skin is warm and dry.      Findings: No rash.      Comments: Spider angioma's nose and cheeks   Neurological:      General: No focal deficit present.      Mental Status: He is alert and oriented to person, place, and time.      Cranial Nerves: No cranial nerve deficit.   Psychiatric:         Mood and Affect: Mood normal.         Behavior: Behavior normal.         Thought Content: Thought content normal.         Judgment: Judgment normal.         ED Course        Procedures          EKG read at 1158.  Heart rate 64, occasional PAC, no ST changes.    Critical Care time:  none               Results for orders placed or performed during the hospital encounter of 12/03/19 (from the past 24 hour(s))   UA reflex to Microscopic and Culture   Result Value Ref Range    Color Urine Yellow     Appearance Urine Clear     Glucose Urine Negative NEG^Negative mg/dL    Bilirubin Urine Negative NEG^Negative    Ketones Urine Negative NEG^Negative mg/dL    Specific Gravity Urine <1.005 1.000 - 1.030    Blood Urine Small (A) NEG^Negative    pH Urine 5.5 5.0 - 9.0 pH    Protein Albumin  Urine Negative NEG^Negative mg/dL    Urobilinogen Urine 0.2 0.2 - 1.0 EU/dL    Nitrite Urine Negative NEG^Negative    Leukocyte Esterase Urine Negative NEG^Negative    Source Midstream Urine    Urine Microscopic   Result Value Ref Range    WBC Urine 0 - 5 OTO5^0 - 5 /HPF    RBC Urine O - 2 OTO2^O - 2 /HPF   CBC with platelets differential   Result Value Ref Range    WBC 6.3 4.0 - 11.0 10e9/L    RBC Count 4.54 4.4 - 5.9 10e12/L    Hemoglobin 14.1 13.3 - 17.7 g/dL    Hematocrit 39.7 (L) 40.0 - 53.0 %    MCV 87 78 - 100 fl    MCH 31.1 26.5 - 33.0 pg    MCHC 35.5 31.5 - 36.5 g/dL    RDW 13.0 10.0 - 15.0 %    Platelet Count 278 150 - 450 10e9/L    Diff Method Automated Method     % Neutrophils 50.1 %    % Lymphocytes 31.2 %    % Monocytes 14.4 %    % Eosinophils 2.9 %    % Basophils 0.8 %    % Immature Granulocytes 0.6 %    Absolute Neutrophil 3.1 1.6 - 8.3 10e9/L    Absolute Lymphocytes 2.0 0.8 - 5.3 10e9/L    Absolute Monocytes 0.9 0.0 - 1.3 10e9/L    Absolute Eosinophils 0.2 0.0 - 0.7 10e9/L    Absolute Basophils 0.1 0.0 - 0.2 10e9/L    Abs Immature Granulocytes 0.0 0 - 0.4 10e9/L   INR   Result Value Ref Range    INR 1.20 0 - 1.3   Partial thromboplastin time   Result Value Ref Range    PTT 37 22 - 37 sec   Comprehensive metabolic panel   Result Value Ref Range    Sodium 120 (L) 134 - 144 mmol/L    Potassium 4.1 3.5 - 5.1 mmol/L    Chloride 89 (L) 98 - 107 mmol/L    Carbon Dioxide 27 21 - 31 mmol/L    Anion Gap 4 3 - 14 mmol/L    Glucose 102 70 - 105 mg/dL    Urea Nitrogen 11 7 - 25 mg/dL    Creatinine 0.81 0.70 - 1.30 mg/dL    GFR Estimate >90 >60 mL/min/[1.73_m2]    GFR Estimate If Black >90 >60 mL/min/[1.73_m2]    Calcium 9.3 8.6 - 10.3 mg/dL    Bilirubin Total 0.6 0.3 - 1.0 mg/dL    Albumin 4.1 3.5 - 5.7 g/dL    Protein Total 7.3 6.4 - 8.9 g/dL    Alkaline Phosphatase 90 34 - 104 U/L    ALT 17 7 - 52 U/L    AST 18 13 - 39 U/L   Lipase   Result Value Ref Range    Lipase 56 11 - 82 U/L   Lactic acid   Result Value  Ref Range    Lactic Acid 0.6 0.5 - 2.2 mmol/L   Troponin GH   Result Value Ref Range    Troponin 3.8 <34.0 pg/mL   CTA Chest Abdomen Pelvis w Contrast    Narrative    PROCEDURE: CTA CHEST ABDOMEN PELVIS W CONTRAST 12/3/2019 1:00 PM    HISTORY: left upper abdominal pain that radiates below left shoulder  blade. known Ascending Aorta aneurysm. aorta change? spleen? kidney?    COMPARISONS: None.    Meds/Dose Given: 99 ml Omnipaque 350    TECHNIQUE: CTA images of the test, abdomen, and pelvis were obtained  after administration of IV contrast. Routine transaxial and  post-processed (multiplanar and/or MIP) reformations were obtained.  Noncontrast images of the chest were also obtained. Volume rendered  reconstructed images were reviewed.    FINDINGS: The aorta is normal in caliber with scattered  atherosclerotic plaque. No aortic dissection or aneurysm. Moderate  calcified and noncalcified plaque in the abdominal aorta produces  slight narrowing distally.    Branch vessels off the aortic arch are patent. In the abdomen, the  celiac, superior mesenteric, bilateral renal, and inferior mesenteric  arteries are all patent with scattered atherosclerotic plaque. The  common and external iliac arteries are patent. There is moderate  narrowing of the right internal iliac artery.    The heart is normal in size. Coronary artery calcifications are  present.    There is a left hilar mass measuring 4.3 x 4.0 cm encasing the  bronchovascular structures in the left upper lobe and to a lesser  extent the left lower lobe. This process involves the fissure in the  left lung. There is adjacent subcarinal lymphadenopathy measuring up  to 2.4 cm in short axis dimension. There are mild groundglass  opacities peripheral to the mass in the left upper lobe/lingula.  Pulmonary artery branches are displaced by this mass but not occluded.  There is narrowing of pulmonary veins in the left upper lobe.    There are several prominent lymph nodes at  the thoracic inlet,  including a right periesophageal lymph node measuring 1.2 cm. No  axillary lymphadenopathy.    There is a 1.5 cm cyst in the liver. Nonspecific 1.2 cm right adrenal  gland nodule is present. The spleen, pancreas, and left adrenal gland  are intact. The kidneys are intact. The gallbladder is present.    The bowel is normal in caliber. The appendix is normal.    No lymphadenopathy or ascites in the abdomen.    No suspicious osseous lesions.         Impression    IMPRESSION:   1. No aortic dissection or aneurysm.  2. Left hilar mass is suspicious for malignancy. Mediastinal  lymphadenopathy is suspicious for metastatic disease. Tissue sampling  recommended.    DELPHINE SALAZAR MD   Sodium   Result Value Ref Range    Sodium 123 (L) 134 - 144 mmol/L       Medications   iohexol (OMNIPAQUE) 350 mg/mL solution 100 mL (99 mLs Intravenous Given 12/3/19 1244)   0.9% sodium chloride BOLUS (0 mLs Intravenous Stopped 12/3/19 1405)   0.9% sodium chloride BOLUS (0 mLs Intravenous Stopped 12/3/19 1504)       Assessments & Plan (with Medical Decision Making)   Patient is nontoxic-appearing in no acute distress.  Heart, lung, bowel sounds normal.  Abdomen soft and appears to be nontender but he does report left upper quadrant pain that travels below his left scapula.  He is slightly hypertensive but otherwise has stable vital signs and is afebrile.    Lab work revealed hyponatremia 120, negative troponin, normal lipase and lactic acid.  No leukocytosis.  Urinalysis had small amount of blood otherwise noninfectious appearing.    Patient given fluids.  Repeat sodium level was 123.    Given patient's increased risk factors of hypertension, hyperlipidemia, longtime smoker, concern for possible aortic abnormality.  CT of chest and abdomen obtained.    CT scan findings show 1. No aortic dissection or aneurysm.  2. Left hilar mass is suspicious for malignancy. Mediastinal  lymphadenopathy is suspicious for metastatic  disease.     I discussed with Dr. Hamilton' office.  Recommend follow-up with PCP to obtain follow-up biopsy.  Referral was placed for patient to follow-up with Dr. Grady.  Overall, he appears very well and in no distress.  Strict return precautions are given, he understands and agrees with plan and patient is discharged.    Chalo Luna PA-C    I have reviewed the nursing notes.    I have reviewed the findings, diagnosis, plan and need for follow up with the patient.       Discharge Medication List as of 12/3/2019  3:04 PM          Final diagnoses:   Hyponatremia   Lung mass       12/3/2019   [facility]     Chalo Luna PA  12/03/19 3646

## 2019-12-03 NOTE — TELEPHONE ENCOUNTER
Pt seen in the ED today for abdominal pain.   Found mass on CT scan.  Would like him to be seen sooner that the 13th.  Would you like him to be worked in?  Ivonne Staley LPN ....................  12/3/2019   3:07 PM

## 2019-12-03 NOTE — DISCHARGE INSTRUCTIONS
Get plenty of fluids and rest.  You do have low sodium today however it appears to be rising while in the ED.  Try to refrain from alcohol use.  Is also found that you have a lung mass today with concern for cancer which is most likely causing some of your discomfort that you have been feeling lately.  Referral was placed for you to follow-up with your PCP to address those findings.  If worsening or concerning symptoms please return to the ED for further evaluation.

## 2019-12-03 NOTE — ED TRIAGE NOTES
"ED Nursing Triage Note (General)   ________________________________    Babar Laboy is a 69 year old Male that presents to triage private car  With history of  Onset of pain over the past three days that has remained steady started in his upper back (over kidney left side) now up into his left lower chest reported by patient with no cough, nausea, diaphoresis, vomiting, shortness of breath or changes in bowel or bladder.  Significant symptoms had onset 3 day(s) ago.  BP (!) 162/97   Temp 98.3  F (36.8  C) (Tympanic)   Ht 1.753 m (5' 9\")   Wt 79.4 kg (175 lb)   SpO2 96%   BMI 25.84 kg/m  t  Patient appears alert , in moderate distress., and cooperative behavior.    GCS Total = 15  Airway: intact  Breathing noted as Normal.  Circulation Normal  Skin normal  Action taken:  To room      PRE HOSPITAL PRIOR LIVING SITUATION Spouse  "

## 2019-12-04 NOTE — TELEPHONE ENCOUNTER
I called back to discuss hyponatremia.  Most likely SIADH.      -- Okay to liberalize salt   -- Restrict fluid   -- Warning signs discussed, return if concerns    Signed, Mario Grady MD, FAAP, FACP  Internal Medicine & Pediatrics

## 2019-12-04 NOTE — TELEPHONE ENCOUNTER
I called and spoke to Mr. Laboy.  He wants to move toward treating this mass.  Spoke to Dr. Prado who requested we send a referral and they will work to expedite the consult.      ICD-10-CM    1. Hilar mass R91.8 PULMONARY MEDICINE REFERRAL     Oncology/Hematology Adult Referral      Orders Placed This Encounter   Procedures     PULMONARY MEDICINE REFERRAL     Oncology/Hematology Adult Referral      -- Pulm consult to consider bronch with biopsy   -- Heme/Onc consult with Dr. Hamilton to follow   -- Okay to cancel the appointment with Dr. Grady on 12/13      Signed, Mario Grady MD, FAAP, FACP  Internal Medicine & Pediatrics

## 2019-12-04 NOTE — TELEPHONE ENCOUNTER
Appt canceled and unit 2 will work on expediting referral.   Ivonne Staley LPN ....................  12/4/2019   11:02 AM

## 2019-12-05 NOTE — TELEPHONE ENCOUNTER
Spoke to Unit 2 scheduling and pt will be seen on December 17th at 9:00 am with Dr. Prado at [facility].  Ivonne Staley LPN ....................  12/5/2019   4:13 PM

## 2019-12-05 NOTE — TELEPHONE ENCOUNTER
Spoke to wife.  Spoke to Unit 2 schedules and they contacted Dr. Prado's office and left message to see if they can get him in sooner.  Ivonne Staley LPN ....................  12/5/2019   2:51 PM

## 2019-12-05 NOTE — TELEPHONE ENCOUNTER
Needs to see Pulm first.  Dr. Hamilton should be after biopsy results are available, likely in 4-6 weeks.  I spoke to Dr. Prado, they told me they would expedite. Please have schedulers contact [facility]'s pulm clinic.    Signed, Mario Grady MD, FAAP, FACP  Internal Medicine & Pediatrics

## 2019-12-05 NOTE — TELEPHONE ENCOUNTER
Called patient back and answered his questions about his upcoming appointments.  Patient had questioned if he needed to see Dr. Hamilton still since he has the appointment with [facility] in January.  I explained that Dr. Hanson is an Oncologist and that these were two different kind of providers.  Patient then understood why he was to see both of them.  He will call if any further concerns.  Divina Del Valle LPN .......12/5/2019 10:02 AM

## 2019-12-05 NOTE — TELEPHONE ENCOUNTER
Called patient's wife back.  She had called after talking to her  and they are still concerned with some questions like why they need to see Dr. Hamilton first before he even has a biopsy. And second that they are willing to travel if needed to get an appointment sooner to see the Pulmonologist.  At this point his appointment is Jan 16th.  They don't want to wait that long.  Divina Del Valle LPN .......12/5/2019 10:17 AM

## 2019-12-06 NOTE — TELEPHONE ENCOUNTER
In more pain than normal on a scale of 1-10 its about a 5 or 6 today and some blood showing up in his phlegm when he coughs.

## 2019-12-06 NOTE — ED AVS SNAPSHOT
[facility]  [address]  [place] [address]  Phone:  [phone]  Fax:  [phone]                                    Bbaar Laboy   MRN: [id number]    Department:  [facility]   Date of Visit:  12/6/2019           After Visit Summary Signature Page    I have received my discharge instructions, and my questions have been answered. I have discussed any challenges I see with this plan with the nurse or doctor.    ..........................................................................................................................................  Patient/Patient Representative Signature      ..........................................................................................................................................  Patient Representative Print Name and Relationship to Patient    ..................................................               ................................................  Date                                   Time    ..........................................................................................................................................  Reviewed by Signature/Title    ...................................................              ..............................................  Date                                               Time          22EPIC Rev 08/18

## 2019-12-06 NOTE — ED TRIAGE NOTES
Pt states that he would help with smoking cessation.  Pt states that Wellbutrin has helped in the past.

## 2019-12-06 NOTE — TELEPHONE ENCOUNTER
"Patient stated he is having severe pain that is on his LLQ and radiates to his anterior side below his LLQ near his rib cage. Patient stated that he presented to the ED on 12/3/19 for similar pain and a mass was discovered in his lower abdomen. He said that he has an appointment with Dr. Schneider on 12/13/19 for follow up with this discovery.   Patient stated the pain started 3-4 days ago and that he has attempted to control the pain with Aleve but the pain has become intolerable and that 1 hour ago a cough has presented that has increased in frequency with blood tinged sputum. Patient rated current pain on a scale of 1-10 he rated his pain a 7 at the moment and stated that the current pain has lasted more then 2 hours and the Aleve is not relieving it anymore.   Based on the information provided per protocol patient needs to present to the ED. Patient agreed to this disposition and stated he would be coming to the ED. Dorothea Olivarez RN on 12/6/2019 at 9:35 AM      Reason for Disposition    SEVERE abdominal pain (e.g., excruciating)    Additional Information    Negative: Passed out (i.e., fainted, collapsed and was not responding)    Negative: Shock suspected (e.g., cold/pale/clammy skin, too weak to stand, low BP, rapid pulse)    Negative: Visible sweat on face or sweat is dripping down    Constant abdominal pain lasting > 2 hours    Answer Assessment - Initial Assessment Questions  1. LOCATION: \"Where does it hurt?\"       Left side of back, lower of rib cage  2. RADIATION: \"Does the pain shoot anywhere else?\" (e.g., chest, back)      Front of lower  3. ONSET: \"When did the pain begin?\" (e.g., minutes, hours or days ago)       3-4 days ago  4. SUDDEN: \"Gradual or sudden onset?\"       Sudden  5. PATTERN \"Does the pain come and go, or is it constant?\"     - If constant: \"Is it getting better, staying the same, or worsening?\"       (Note: Constant means the pain never goes away completely; most serious pain is constant " "and it progresses)      - If intermittent: \"How long does it last?\" \"Do you have pain now?\"      (Note: Intermittent means the pain goes away completely between bouts)      Intermittent lasts longer then 2 hours  6. SEVERITY: \"How bad is the pain?\"  (e.g., Scale 1-10; mild, moderate, or severe)     - MILD (1-3): doesn't interfere with normal activities, abdomen soft and not tender to touch      - MODERATE (4-7): interferes with normal activities or awakens from sleep, tender to touch      - SEVERE (8-10): excruciating pain, doubled over, unable to do any normal activities        7  7. RECURRENT SYMPTOM: \"Have you ever had this type of abdominal pain before?\" If so, ask: \"When was the last time?\" and \"What happened that time?\"       No  8. AGGRAVATING FACTORS: \"Does anything seem to cause this pain?\" (e.g., foods, stress, alcohol)      no  9. CARDIAC SYMPTOMS: \"Do you have any of the following symptoms: chest pain, difficulty breathing, sweating, nausea?\"      No  10. OTHER SYMPTOMS: \"Do you have any other symptoms?\" (e.g., fever, vomiting, diarrhea)        Light headed, slightly,intermittent, 1 hour ago.  11. PREGNANCY: \"Is there any chance you are pregnant?\" \"When was your last menstrual period?\"        No    Protocols used: ABDOMINAL PAIN - UPPER-A-OH      "

## 2019-12-06 NOTE — ED TRIAGE NOTES
Pt arrives to the ED via private car.  Pt states that he was in the ED last week and found tumors on his lung and was told to come back if his symptoms got worse.  Pt states he is lightheaded, coughing up more blood, pain, SOB.

## 2019-12-11 NOTE — TELEPHONE ENCOUNTER
Contacted the patients KARSTEN schroeder and gave him the information below.  Shi Lugo LPN on 12/11/2019 at 9:53 AM

## 2019-12-11 NOTE — TELEPHONE ENCOUNTER
Pt's PCP is gone-he was in ER on 6th rec'd a few pain meds and is going to see pulmonologist tomorrow-They have a couple pills left but want to know if for the trip to [place] they could break 1 in 1/2 (they are 5mg) and take 1and1/2 if needed for the trip.

## 2019-12-17 NOTE — PROGRESS NOTES
Per Dr. Whalen at [facility], he shoud have PET scan, MR brain, and PFT. Patient will have consult with Jeff Hamilton MD January 10. Ok order per Jeff Hamilton MD verbal order.  Zaria Man RN...........12/17/2019 2:41 PM

## 2019-12-20 PROBLEM — C34.90 MALIGNANT NEOPLASM OF LUNG, UNSPECIFIED LATERALITY, UNSPECIFIED PART OF LUNG (H): Status: ACTIVE | Noted: 2019-01-01

## 2019-12-20 NOTE — NURSING NOTE
"Chief Complaint   Patient presents with     Consult     Lung Cancer       Initial /88 (BP Location: Right arm, Patient Position: Sitting, Cuff Size: Adult Regular)   Pulse 60   Temp 98.1  F (36.7  C) (Oral)   Resp 16   Ht 1.753 m (5' 9.02\")   Wt 84.8 kg (187 lb)   SpO2 99%   BMI 27.60 kg/m   Estimated body mass index is 27.6 kg/m  as calculated from the following:    Height as of this encounter: 1.753 m (5' 9.02\").    Weight as of this encounter: 84.8 kg (187 lb).  Medication Reconciliation: complete  Landy Lamb LPN  "

## 2019-12-20 NOTE — TELEPHONE ENCOUNTER
Scheduled for power port placement with Dr. Quiros on 12/23/19. Surgery handbook brought to oncology nurse to review with patient today.  Brii Darnell LPN..........12/20/2019  8:33 AM

## 2019-12-21 NOTE — PROGRESS NOTES
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      REQUESTING PHYSICIAN:  Dr. Whalen and Dr. Grady.      HISTORY OF PRESENT ILLNESS:  Mr. Laboy is a 69-year-old white male with a history of tobacco abuse, obstructive sleep apnea, atrial fibrillation, we were asked to evaluate concerning a new diagnosis of extensive stage small cell lung carcinoma with brain metastases.  Apparently he had presented to the emergency room on 12/03 with complaints of left-sided abdominal/flank pain that radiated to his left shoulder blade.  A CAT scan of the chest was performed and revealed a 4.3 cm large left hilar mass with extrinsic compression of the lingular subsegment along with a large subcarinal lymph node.  The patient subsequently was referred to Dr. Alicea, Pulmonary at [facility], who saw the patient 12/12, who recommended a bronchoscopy with EBUS under general anesthesia and this was performed by Dr. Whalen's partner who performed bronchoscopy with transbronchial biopsies on 12/13.  Pathology revealed that the patient had a transbronchial biopsy that was consistent with small cell lung carcinoma.  Mediastinal mass was also positive for small cell carcinoma.  Washings were suspicious for small cell carcinoma as well as transbronchial biopsy of mediastinal mass was also suspicious for small cell carcinoma.  The patient was scheduled to have a PET scan, but his MRI of the brain was performed this week and the findings were there were 14 separate enhancing lesions noted within the brain, largest measuring 9 mm in the left occipital lobe.  The appearance in the setting of recent lung cancer diagnosis  is most compatible with disseminated brain metastatic disease.  The patient comes in now for further treatment options.  He has not had a PET scan yet, but he is a candidate for treatment at this point.  He gets  occasional blood-tinged sputum but no jose hemoptysis.  He gets occasional shortness of breath, occasional cough.  Continues to complain of left-sided abdominal/flank pain.  Denies any headaches or ataxia.  No blurred vision.  No change in mental status.  He also describes left-sided posterior and anterior rib pain that is sharp and intermittent and does occasionally cause pleuritic chest pain.  He is a 50-pack-year smoker.  He just recently is attempting to quit.  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      PAST MEDICAL HISTORY:  Significant for tobacco abuse, chronic atrial fibrillation on Eliquis, obstructive sleep apnea, ascending aortic aneurysm, benign essential hypertension, history of adenomatous polyp of the colon, mixed hyperlipidemia, acne rosacea.      ALLERGIES:  HAS NO KNOWN DRUG ALLERGIES.      MEDICATIONS:  Medications he is currently on include:   1.  Wellbutrin 100 mg b.i.d.   2.  Oxycodone 5 mg q.6h. p.r.n. pain.   3.  Eliquis 5 mg b.i.d.   4.  Cozaar 25 mg daily.   5.  Atenolol 50 mg daily.   6.  Lipitor 10 mg daily.   7.  Hydrochlorothiazide 25 mg daily.      SOCIAL HISTORY:  He is a 50-pack-year smoker; he says he quit on 12/03.  Alcohol:  He did drink a case of beer a week; he says he stopped drinking currently.  He worked in the power plant as a  and , exposed to asbestos.      FAMILY HISTORY:  Significant for brother with pancreatic cancer.      REVIEW OF SYSTEMS:   CENTRAL NERVOUS SYSTEM:  Negative for headache, change in mental status.   ENT:  Negative for hearing loss.   RESPIRATORY:  Significant for cough, pleuritic chest pain, dyspnea on exertion.   CARDIAC:  Negative chest pain.  He does get occasional palpitations.  No orthopnea, PND, ankle edema.   GASTROINTESTINAL:  Significant for constipation.  No bright red blood per rectum, hematemesis, diarrhea, melena.  He does get some left upper quadrant abdominal pain,  flank pain.   MUSCULOSKELETAL:  Unremarkable.   GENITOURINARY:  Negative for nocturia, urgency, frequency, hematuria.   CONSTITUTIONAL:  Negative for fevers, night sweats, weight loss.   HEMATOLOGIC:  Negative for easy bruisability, gingival bleeding, epistaxis.      PHYSICAL EXAMINATION:   GENERAL:  He is a middle-aged white male in no acute distress.   VITAL SIGNS:  Blood pressure 124/88, pulse rate 60, respirations 16, temperature 98.1.   HEENT:  Atraumatic, normocephalic.  Oropharynx is nonerythematous.   NECK:  Supple.   CHEST:  Lungs are clear to auscultation and percussion.   MUSCULOSKELETAL:  There is tenderness over the left flank and anterior axillary line to palpation.   HEART:  Regular rhythm, S1, S2 normal.   ABDOMEN:  Soft.  Some tenderness just below the left rib cage.  No rebound.   LYMPHATICS:  No cervical, supraclavicular, axillary or inguinal nodes.   EXTREMITIES:  Without edema.   NEUROLOGIC:  Grossly nonfocal.  Romberg is negative.      IMPRESSION:  Extensive stage small cell lung carcinoma with asymptomatic brain mets.  The patient has not had a PET scan, but given the fact he has brain mets, he needs to start therapy as soon as possible.  We will get the PET scan on 12/27 but this will not change treatment.  We would recommend carboplatin, etoposide and atezolizumab which is indicated in extensive stage small cell lung carcinoma.  It does penetrate the blood-brain barrier.  The patient is asymptomatic in terms of brain mets; therefore, we will proceed with therapy as soon as possible.  We would like to have a port placed.  We will have a General Surgery consult.  Otherwise, we will start the patient on carboplatin AUC of 5 on day 1 with atezolizumab 1200 mg IV on day 1 with etoposide 100 mg/m2 on days 1, 2 and 3 of a 21-day cycle.  We will plan on 4 cycles, then restage with PET scan and MRI of the brain.  At that point, if he has a complete or near complete response, we will proceed with  whole brain radiation therapy and then follow this with maintenance atezolizumab. This regimen has a high response rate.  Disease was considered not curable but is very responsive and may prolong his life.  We would therefore like to start as soon as possible.  We will obtain CBC, CMP, LDH.      Seventy minutes was spent with the patient, greater than half the time was spent in counseling and coordinating of care.         ALANA NANCE MD             D: 2019   T: 2019   MT: TOBIN      Name:     NINA GODINEZ   MRN:      [id number]        Account:      [id number]   :      1950           Visit Date:   2019      Document: [id number]       cc: Nina Grady MD

## 2019-12-23 NOTE — OP NOTE
Preoperative Diagnosis: Lung cancer    Postoperative Diagnosis: Lung cancer    Procedure planned: Port placement     Procedure performed: Right IJ port placement with US guidance     Surgeon: Nitin Quiros MD    Circulator: Breann Vital RN  Relief Circulator: Nohemi Bañuelos RN  Scrub Person: Dorothea Alvarado; Jak Soto  X-Ray Technologist: Clara Ortiz  Pre-Op Nurse: Aubree Ricardo RN    Anesthesia: Monitored anesthesia care, local     Specimen: none     Estimated Blood Loss: Less than 10 ml     INDICATIONS     Please see the H&P. The patient has .  The risks, benefits and alternatives to implanted port for chemotherapy were discussed with the patient. We specifically discussed the risks of infection, bleeding, injury to blood vessels and lung, blood clot, port malfunction and the possible need for further procedures. The patient expressed understanding and questions were answered. Informed consent paperwork was completed.     DESCRIPTION OF PROCEDURE     Thepatient was brought to the operating room and placed in a supine position on the operating table. Appropriate monitors were attached. The patient received IV antibiotics preoperatively.  After sedation was administered, thepatient was positioned, prepped with Chloraprep and draped in the standard fashion. Time out was performed confirming the patient's identity and procedure to be performed.   Local anesthetic was infiltrated in the skin and subcutaneous tissue of the internal jugular and in the area below the right clavicle. Cook needle was used to access the right internal jugular vein under US guidance. There was return of non-pulsatile blood. The guide wire was advanced through the needle andplacement confirmed using flouroscopy. The needle was removed and the guide wire was secured. Local anesthetic was infiltrated in the area of planned pocket formation. Skin incision was made sharply and carried down to the subcutaneous  tissue. Hemostasis was excellent. The pocket was checked for size and the port fit without difficulty. The dilator and sheath were advanced over the wire under flouroscopy. The dilator and wire were removed. Thecatheter was flushed with heparinized saline and advanced through the sheath under fluoroscopy. The sheath was then removed with no difficulty. The catheter tip was confirmed in the superior vena cava with fluoroscopy. The catheter was cut at 20 cm and attached to the port and locked in position. The port was positioned in the pocket and secured using Prolene sutures. The port was accessed and withdrew blood and flushed easily. Deep tissue was approximated using Monocryl suture. Further local anesthetic was infiltrated for post operative pain control. Skin edges were approximated with Monocryl suture. Liquid bandage was applied. The patient was then awakened from sedation and taken to recovery for chest XR in stable condition. All needle, sponge and instrument counts were reported as correct at the conclusion of the case. The patient tolerated the procedure with noimmediately apparent complications.     Nitin Quiros MD on 12/23/2019 at 3:03 PM     CC: Mario Grady

## 2019-12-23 NOTE — INTERVAL H&P NOTE
I saw and examined Babar Laboy.  I have reviewed the history and physical and find no changes to the patient's medical status or condition with the exceptions noted below.     Nitin Quiros MD   1:23 PM 12/23/2019

## 2019-12-23 NOTE — DISCHARGE INSTRUCTIONS
[facility]  Adult Discharge Orders & Instructions    ________________________________________________________________          For 12 hours after surgery  1. Get plenty of rest.  A responsible adult must stay with you for at least 12 hours after you leave the hospital.   2. You may feel lightheaded.  IF so, sit for a few minutes before standing.  Have someone help you get up.   3. You may have a slight fever. Call the doctor if your fever is over 101 F (38.3 C) (taken under the tongue) or lasts longer than 24 hours.  4. You may have a dry mouth, a sore throat, muscle aches or trouble sleeping.  These should go away after 24 hours.  5. Do not make important or legal decisions.  6.   Do not drive or use heavy equipment.  If you have weakness or tingling, don't drive or use heavy equipment until this feeling goes away.    To contact a doctor, call   [phone]_______________________

## 2019-12-23 NOTE — ANESTHESIA CARE TRANSFER NOTE
Patient: Babar Laboy    Procedure(s):  INSERTION, VASCULAR ACCESS PORT    Diagnosis: Lung cancer (H) [C34.90]  Diagnosis Additional Information: No value filed.    Anesthesia Type:   MAC     Note:  Airway :Face Mask (Patient with good spont. resp. patient transported on 10L O2 via simple mask, O2 therapy cont. during care transfer)  Patient transferred to:Phase II  Handoff Report: Identifed the Patient, Identified the Reponsible Provider, Reviewed the pertinent medical history, Discussed the surgical course, Reviewed Intra-OP anesthesia mangement and issues during anesthesia, Set expectations for post-procedure period and Allowed opportunity for questions and acknowledgement of understanding      Vitals: (Last set prior to Anesthesia Care Transfer)    CRNA VITALS  12/23/2019 1426 - 12/23/2019 1459      12/23/2019             Resp Rate (set):  10                Electronically Signed By: David Kellerman, APRN CRNA  December 23, 2019  2:59 PM

## 2019-12-23 NOTE — ANESTHESIA PREPROCEDURE EVALUATION
Anesthesia Pre-Procedure Evaluation    Patient: Babar Laboy   MRN: [id number] : 1950          Preoperative Diagnosis: Lung cancer (H) [C34.90]    Procedure(s):  INSERTION, VASCULAR ACCESS PORT    Past Medical History:   Diagnosis Date     Encounter for general adult medical examination without abnormal findings     No Comments Provided     Essential (primary) hypertension     No Comments Provided     Other microscopic hematuria     No Comments Provided     Other problems related to lifestyle     4-5 beers/day     Residual hemorrhoidal skin tags     ,noted on colonoscopy     Rosacea     2011     Tobacco use     No Comments Provided     Unilateral inguinal hernia without obstruction or gangrene     2011     Past Surgical History:   Procedure Laterality Date     COLONOSCOPY      ,F/U      COLONOSCOPY  2016,F/U  tubular adenomas     OTHER SURGICAL HISTORY      825372,OTHER     OTHER SURGICAL HISTORY      633996,OTHER     OTHER SURGICAL HISTORY      15,,HERNIA REPAIR,Right,RIH with mesh     RELEASE CARPAL TUNNEL      ,right       Anesthesia Evaluation     . Pt has had prior anesthetic. Type: General    No history of anesthetic complications          ROS/MED HX    ENT/Pulmonary: Comment: Asymmetric septal hypertrophy, report increased SOB following lung ca diagnosis    (+)sleep apnea, tobacco use, Past use uses CPAP , . .    Neurologic:  - neg neurologic ROS     Cardiovascular: Comment: Ascending aortic aneurysm    (+) hypertension----. Taking blood thinners : . . . :. dysrhythmias a-fib, .       METS/Exercise Tolerance:  >4 METS   Hematologic:  - neg hematologic  ROS       Musculoskeletal:  - neg musculoskeletal ROS       GI/Hepatic:  - neg GI/hepatic ROS       Renal/Genitourinary:  - ROS Renal section negative       Endo:  - neg endo ROS       Psychiatric:  - neg psychiatric ROS       Infectious Disease:  - neg infectious disease ROS       Malignancy:  "  (+) Malignancy History of Lung and Other  Lung CA Active status post. Other CA Brain Active status post         Other:    - neg other ROS                      Physical Exam  Normal systems: cardiovascular and pulmonary    Airway   Mallampati: II  TM distance: >3 FB  Neck ROM: full    Dental   (+) chipped    Cardiovascular   Rhythm and rate: regular and normal      Pulmonary             Lab Results   Component Value Date    WBC 6.8 12/20/2019    HGB 14.7 12/20/2019    HCT 42.5 12/20/2019     12/20/2019     (L) 12/20/2019    POTASSIUM 4.2 12/20/2019    CHLORIDE 92 (L) 12/20/2019    CO2 30 12/20/2019    BUN 17 12/20/2019    CR 1.11 12/20/2019     (H) 12/20/2019    DONELL 10.1 12/20/2019    ALBUMIN 4.6 12/20/2019    PROTTOTAL 8.3 12/20/2019    ALT 18 12/20/2019    AST 16 12/20/2019    ALKPHOS 93 12/20/2019    BILITOTAL 0.4 12/20/2019    LIPASE 56 12/03/2019    PTT 37 12/03/2019    INR 1.20 12/03/2019       Preop Vitals  BP Readings from Last 3 Encounters:   12/20/19 124/88   12/06/19 (!) 150/80   12/03/19 (!) 175/99    Pulse Readings from Last 3 Encounters:   12/20/19 60   12/03/19 58   11/07/19 60      Resp Readings from Last 3 Encounters:   12/20/19 16   12/06/19 20   12/03/19 12    SpO2 Readings from Last 3 Encounters:   12/20/19 99%   12/06/19 99%   12/03/19 98%      Temp Readings from Last 1 Encounters:   12/20/19 98.1  F (36.7  C) (Oral)    Ht Readings from Last 1 Encounters:   12/20/19 1.753 m (5' 9.02\")      Wt Readings from Last 1 Encounters:   12/20/19 84.8 kg (187 lb)    Estimated body mass index is 27.6 kg/m  as calculated from the following:    Height as of 12/20/19: 1.753 m (5' 9.02\").    Weight as of 12/20/19: 84.8 kg (187 lb).       Anesthesia Plan      History & Physical Review      ASA Status:  2 .    NPO Status:  > 8 hours    Plan for MAC with Intravenous induction. Maintenance will be Balanced.    PONV prophylaxis:  Ondansetron (or other 5HT-3) and Dexamethasone or " Solumedrol  Risks, benefits and alternatives discussed and would like to proceed. General anesthesia with LMA ok if indicated.         Postoperative Care  Postoperative pain management:  IV analgesics and Multi-modal analgesia.      Consents  Anesthetic plan, risks, benefits and alternatives discussed with:  Patient and Spouse.  Use of blood products discussed: No .   .                 BEBETO Pimentel CRNA

## 2019-12-23 NOTE — ANESTHESIA POSTPROCEDURE EVALUATION
Patient: Babar Laboy    Procedure(s):  INSERTION, VASCULAR ACCESS PORT    Diagnosis:Lung cancer (H) [C34.90]  Diagnosis Additional Information: No value filed.    Anesthesia Type:  MAC    Note:  Anesthesia Post Evaluation    Patient location during evaluation: Phase 2  Patient participation: Able to fully participate in evaluation  Level of consciousness: awake  Pain management: adequate  Airway patency: patent  Cardiovascular status: acceptable  Respiratory status: acceptable  Hydration status: acceptable  PONV: none             Last vitals:  Vitals:    12/23/19 1259   BP: (!) 148/100   Resp: 18   Temp: 97.3  F (36.3  C)         Electronically Signed By: David Kellerman, APRN CRNA  December 23, 2019  3:01 PM

## 2019-12-24 NOTE — OR NURSING
Central Line Insertion Note/Procedural Checklist      12/23/2019   02:51 PM  Type of Catheter: POWERPORT, RIGHT INTERNAL JUGULAR     consent was obtained:  YES. PT.  Medical/ Surgical/ Allergies History reviewed: Yes.    Preprocedure Verification: Yes.  1) Patient identity verified; 2) side/site/procedure confirmed; 3) relevant information/documentation available, reviewed and properly matched to the patient; 4) consent accurate and complete; 5) equipment and supplies available   Site Marking:  N/A  Site marked if not in continuous attendance with the patient  Time Out:  Yes.  Time out was conducted just prior to starting procedure to verify the four required elements: 1) patient name and date of birth 2) confirmation that the correct side/site are marked if applicable, including visualization of the site cecile 3) name of procedure including laterality if applicable, and 4) essential imaging and results are properly labeled and appropriately displayed, if applicable.    Central line was placed using the following Central Line Insertion Checklist:    Hand Hygiene: Yes or NO: Yes.   Maximal Barrier Precautions including Sterile Gown, Hat and Mask:Yes.   Full Body Drape: Yes.    Site cleansed with:  Prep? Yes.   Sterile field maintained: Yes.    During procedure, did the provider:  Wear sterile gloves during catheter insertion? Yes.    Wear hat, mask, and sterile gown? Yes.  Maintain sterile field? YES  Did all staff and patient in the room wear a mask? Yes.    After the procedure:  Was sterile technique maintained when applying dressing? Yes.  Was dressing dated? NO. EXOFIN SKIN GLUE USED.    12/24/2019  Name of Procedure MD CHELA MARAVILLA   Name of RN () TIKI KAISER RN/JAYLIN ALONSO RN

## 2019-12-30 NOTE — PROGRESS NOTES
Infusion Nursing Note:  Babar Laboy presents today for day 1 cycle 1.    Patient seen by provider today: Yes: Darshan   present during visit today: Not Applicable.    Note: patient left accessed for treatment tomorrow, instructed patient to keep area clean and dry and the line should only be accessed by a registered nurse to keep it sterile, green cap in place for protection.    Intravenous Access:  Labs drawn without difficulty.  Implanted Port.    Treatment Conditions:  Lab Results   Component Value Date    HGB 13.4 12/30/2019     Lab Results   Component Value Date    WBC 7.7 12/30/2019      Lab Results   Component Value Date    ANEU 5.0 12/30/2019     Lab Results   Component Value Date     12/30/2019      Lab Results   Component Value Date     12/30/2019                   Lab Results   Component Value Date    POTASSIUM 3.4 12/30/2019           No results found for: MAG         Lab Results   Component Value Date    CR 1.01 12/30/2019                   Lab Results   Component Value Date    DONELL 9.5 12/30/2019                Lab Results   Component Value Date    BILITOTAL 0.7 12/30/2019           Lab Results   Component Value Date    ALBUMIN 4.3 12/30/2019                    Lab Results   Component Value Date    ALT 15 12/30/2019           Lab Results   Component Value Date    AST 14 12/30/2019       Results reviewed, labs MET treatment parameters, ok to proceed with treatment.      Post Infusion Assessment:  Patient tolerated infusion without incident.  Blood return noted pre and post infusion.  Site patent and intact, free from redness, edema or discomfort.  No evidence of extravasations.       Discharge Plan:   Discharge instructions reviewed with: Patient and wife.  Patient and/or family verbalized understanding of discharge instructions and all questions answered.  Patient discharged in stable condition accompanied by: wife.  Departure Mode: Ambulatory, returns tomorrow for day 2,  elisha AVS.    Jean S. Hammann, RN

## 2019-12-30 NOTE — PROGRESS NOTES
Patient seen in infusion therapy today for a first treatment visit. Patient here to receive cycle 1 day 1 carboplatin/etoposide/atezolizumab for his metastatic lung cancer. Patient has known metastatic disease to his brain. Most recent PET scan from 12/27/19 shows 5.8 cm primary left hilar small cell lung cancer  involving the upper and lower lobes. Subcarinal, right thoracic inlet, and upper abdominal adenopathy. Metastatic right adrenal nodule. 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  Recommendation from radiologist is MR spine with without contrast, consider radiation therapy referral. Patient does reports mid to low back pain. He denies any numbness or weakness of extremities. He denies headaches, dizziness. Spoke with Dr Hamilton regarding PET scan results. Will order a thoracic and lumbar MRI and possible referral to radiation therapy per Dr Hamilton. MRI will be done tomorrow. Labs are within parameters for treatment today. We did discuss possible side effects of treatment. All questions answered. Patient will return tomorrow day day 2 chemotherapy and MRI.

## 2019-12-30 NOTE — NURSING NOTE
Patient is being seen by the provider in infusion where all required infusion department rooming assessments, screenings, and vital signs were done by infusion RN.   Zaria Man RN...........12/30/2019 9:32 AM

## 2019-12-30 NOTE — PHARMACY-CONSULT NOTE
Pharmacy: NEW INFUSION MEDICATION EDUCATION    Babar Laboy  [address]   [place] [address]  [phone] (home)   69 year old male  PCP:Mario Grady    No Known Allergies      Summary of Education:   Met with patient and patient's wife today to review new medications to be administered in infusion including carboplatin, etoposide, and atezolizumab. Discussed cycle length and use of home PRN medications. Discussed possible medication interaction between etoposide and grapefruit juice. Patient states he does not eat grapefruit or drink grapefruit juice. Patient and patient's wife asked a few questions and all concerns were addressed.    Materials Provided:   Drug information handouts printed from [employer] on: carboplatin, etoposide, and atezolizumab. Also provided handouts from [employer] for lorazepam and prochlorperazine per patient's request.     Thank you for the consult.     Kassandra Shah [facility] ....................  12/30/2019   2:32 PM

## 2019-12-30 NOTE — PROGRESS NOTES
"CHEMOTHERAPY EDUCATION    Babar Laboy is a 69 year old male here today for chemotherapy education, accompanied by wife Julianne. Patient has a cancer diagnosis of metastatic lung cancer and their main concern is side effects. Patient's oncologist is Joy and primary provider is Mario Grady.    Reviewed the following with the patient and their support person:    General chemotherapy information, including ways it is excreted from the body and cleaning containment of vomitus or other bodily fluid, use of thebathroom, sexual health and intimacy, what to do if needing to miss a treatment, when to call the provider and the need for staff to wear protective equipment.  Importance of central line care (port) or IV site care.    Treatment regimen; Carboplatin/etoposide/atezolizumab and rationale for strict adherence, specific medication names including pre-treatment medications and at home scheduled or as needed medications, delivery methods, and side effects and managementincluding; skin changes/hand-foot syndrome, anemia, neutropenia, thrombocytopenia, diarrhea/constipation, hair loss syndrome, memory changes/\"chemobrain\", mouth sores, taste changes, neuropathy, fatigue, myelosuppression,and risk of extravasation or infiltration.  Infection prevention, and monitoring of lab values, what lab tests and what changes of these values meant, along with the possibility of hydration or blood product transfusion,or the need to defer or hold treatment.    General orientation to the Medical Oncology department, InfusionServices department, HUCs/scheduling, bathrooms and usual flow of the treatment day provided as well as introduction to the Infusion nurses.  Patient received written information including Guide to Cancer Services folder, specific drug information guides, approved Internet sources, available community resources, and side effectspecific management pamphlets. Business card with contact information " given.  Barriers to learning noted: none noted.  Patient and wife verbalized understanding of all written and verbal information.    Type of treatment: IV therapy every 21 days with treatment lasting three days    Other concerns: Patient instructed to call with further questions or concerns. Patient states understanding and is in agreement with this plan. 60 minutes spent educating patient and family member/support person, coordinating infusion and answeringquestions.    Jean S. Hammann, RN .............12/30/2019  1:43 PM

## 2019-12-31 NOTE — ADDENDUM NOTE
Encounter addended by: Hammann, Jean S., RN on: 12/31/2019 11:41 AM   Actions taken: MAR administration accepted

## 2019-12-31 NOTE — PROGRESS NOTES
Infusion Nursing Note:  Babar Laboy presents today for day 2 cycle 1.    Patient seen by provider today: No   present during visit today: Not Applicable.    Note: N/A.    Intravenous Access:  Implanted Port.    Treatment Conditions:  Not Applicable.      Post Infusion Assessment:  Patient tolerated infusion without incident.  Blood return noted pre and post infusion.  Site patent and intact, free from redness, edema or discomfort.  No evidence of extravasations.  Access discontinued per protocol.       Discharge Plan:   Discharge instructions reviewed with: Patient and wife.  Patient and/or family verbalized understanding of discharge instructions and all questions answered.  Patient discharged in stable condition accompanied by: wife.  Departure Mode: Ambulatory returns Thursday to complete treatment.    Jean S. Hammann, RN

## 2020-01-01 ENCOUNTER — TELEPHONE (OUTPATIENT)
Dept: SURGERY | Facility: OTHER | Age: 70
End: 2020-01-01

## 2020-01-01 ENCOUNTER — MYC MEDICAL ADVICE (OUTPATIENT)
Dept: ONCOLOGY | Facility: OTHER | Age: 70
End: 2020-01-01

## 2020-01-01 ENCOUNTER — DOCUMENTATION ONLY (OUTPATIENT)
Dept: ONCOLOGY | Facility: OTHER | Age: 70
End: 2020-01-01

## 2020-01-01 ENCOUNTER — RESULTS ONLY (OUTPATIENT)
Dept: RADIATION ONCOLOGY | Facility: HOSPITAL | Age: 70
End: 2020-01-01

## 2020-01-01 ENCOUNTER — ONCOLOGY VISIT (OUTPATIENT)
Dept: ONCOLOGY | Facility: OTHER | Age: 70
End: 2020-01-01
Attending: INTERNAL MEDICINE
Payer: COMMERCIAL

## 2020-01-01 ENCOUNTER — APPOINTMENT (OUTPATIENT)
Dept: RADIATION ONCOLOGY | Facility: HOSPITAL | Age: 70
End: 2020-01-01
Payer: COMMERCIAL

## 2020-01-01 ENCOUNTER — APPOINTMENT (OUTPATIENT)
Dept: CT IMAGING | Facility: OTHER | Age: 70
End: 2020-01-01
Attending: FAMILY MEDICINE
Payer: COMMERCIAL

## 2020-01-01 ENCOUNTER — TRANSFERRED RECORDS (OUTPATIENT)
Dept: HEALTH INFORMATION MANAGEMENT | Facility: OTHER | Age: 70
End: 2020-01-01

## 2020-01-01 ENCOUNTER — HOSPITAL ENCOUNTER (OUTPATIENT)
Dept: INFUSION THERAPY | Facility: OTHER | Age: 70
Discharge: HOME | End: 2020-06-18
Attending: INTERNAL MEDICINE | Admitting: INTERNAL MEDICINE
Payer: COMMERCIAL

## 2020-01-01 ENCOUNTER — MEDICAL CORRESPONDENCE (OUTPATIENT)
Dept: HEALTH INFORMATION MANAGEMENT | Facility: OTHER | Age: 70
End: 2020-01-01

## 2020-01-01 ENCOUNTER — TELEPHONE (OUTPATIENT)
Dept: ONCOLOGY | Facility: OTHER | Age: 70
End: 2020-01-01

## 2020-01-01 ENCOUNTER — ANESTHESIA EVENT (OUTPATIENT)
Dept: SURGERY | Facility: OTHER | Age: 70
End: 2020-01-01
Payer: COMMERCIAL

## 2020-01-01 ENCOUNTER — HOSPITAL ENCOUNTER (OUTPATIENT)
Dept: NUCLEAR MEDICINE | Facility: OTHER | Age: 70
End: 2020-07-07
Attending: INTERNAL MEDICINE
Payer: COMMERCIAL

## 2020-01-01 ENCOUNTER — HOSPITAL ENCOUNTER (OUTPATIENT)
Dept: INFUSION THERAPY | Facility: OTHER | Age: 70
End: 2020-07-13
Attending: INTERNAL MEDICINE
Payer: COMMERCIAL

## 2020-01-01 ENCOUNTER — HOSPITAL ENCOUNTER (OUTPATIENT)
Dept: CT IMAGING | Facility: OTHER | Age: 70
End: 2020-07-07
Attending: INTERNAL MEDICINE
Payer: COMMERCIAL

## 2020-01-01 ENCOUNTER — OFFICE VISIT (OUTPATIENT)
Dept: SURGERY | Facility: OTHER | Age: 70
End: 2020-01-01
Attending: SURGERY
Payer: COMMERCIAL

## 2020-01-01 ENCOUNTER — OFFICE VISIT (OUTPATIENT)
Dept: FAMILY MEDICINE | Facility: OTHER | Age: 70
End: 2020-01-01
Attending: FAMILY MEDICINE
Payer: COMMERCIAL

## 2020-01-01 ENCOUNTER — HOSPITAL ENCOUNTER (EMERGENCY)
Facility: OTHER | Age: 70
Discharge: ANOTHER HOSPITAL | End: 2020-09-14
Attending: FAMILY MEDICINE | Admitting: FAMILY MEDICINE
Payer: COMMERCIAL

## 2020-01-01 ENCOUNTER — HOSPITAL ENCOUNTER (OUTPATIENT)
Dept: INFUSION THERAPY | Facility: OTHER | Age: 70
Discharge: HOME | End: 2020-08-03
Attending: INTERNAL MEDICINE | Admitting: INTERNAL MEDICINE
Payer: COMMERCIAL

## 2020-01-01 ENCOUNTER — ALLIED HEALTH/NURSE VISIT (OUTPATIENT)
Dept: RADIATION ONCOLOGY | Facility: HOSPITAL | Age: 70
End: 2020-01-01
Payer: COMMERCIAL

## 2020-01-01 ENCOUNTER — TELEPHONE (OUTPATIENT)
Dept: PEDIATRICS | Facility: OTHER | Age: 70
End: 2020-01-01

## 2020-01-01 ENCOUNTER — HOSPITAL ENCOUNTER (OUTPATIENT)
Dept: INFUSION THERAPY | Facility: OTHER | Age: 70
Discharge: HOME | End: 2020-02-17
Attending: INTERNAL MEDICINE | Admitting: INTERNAL MEDICINE
Payer: COMMERCIAL

## 2020-01-01 ENCOUNTER — HOSPITAL ENCOUNTER (OUTPATIENT)
Dept: INFUSION THERAPY | Facility: OTHER | Age: 70
Discharge: HOME | End: 2020-08-07
Attending: INTERNAL MEDICINE | Admitting: INTERNAL MEDICINE
Payer: COMMERCIAL

## 2020-01-01 ENCOUNTER — HOSPITAL ENCOUNTER (OUTPATIENT)
Dept: INFUSION THERAPY | Facility: OTHER | Age: 70
Discharge: HOME | End: 2020-07-03
Attending: NURSE PRACTITIONER | Admitting: NURSE PRACTITIONER
Payer: COMMERCIAL

## 2020-01-01 ENCOUNTER — TELEPHONE (OUTPATIENT)
Dept: FAMILY MEDICINE | Facility: OTHER | Age: 70
End: 2020-01-01

## 2020-01-01 ENCOUNTER — OFFICE VISIT (OUTPATIENT)
Dept: FAMILY MEDICINE | Facility: OTHER | Age: 70
End: 2020-01-01
Attending: INTERNAL MEDICINE
Payer: COMMERCIAL

## 2020-01-01 ENCOUNTER — TELEPHONE (OUTPATIENT)
Dept: RADIATION ONCOLOGY | Facility: HOSPITAL | Age: 70
End: 2020-01-01

## 2020-01-01 ENCOUNTER — HOSPITAL ENCOUNTER (OUTPATIENT)
Dept: INFUSION THERAPY | Facility: OTHER | Age: 70
Discharge: HOME | End: 2020-07-17
Attending: INTERNAL MEDICINE | Admitting: INTERNAL MEDICINE
Payer: COMMERCIAL

## 2020-01-01 ENCOUNTER — OFFICE VISIT (OUTPATIENT)
Dept: RADIATION ONCOLOGY | Facility: HOSPITAL | Age: 70
End: 2020-01-01
Attending: RADIOLOGY
Payer: COMMERCIAL

## 2020-01-01 ENCOUNTER — ALLIED HEALTH/NURSE VISIT (OUTPATIENT)
Dept: RADIATION ONCOLOGY | Facility: HOSPITAL | Age: 70
End: 2020-01-01

## 2020-01-01 ENCOUNTER — OFFICE VISIT (OUTPATIENT)
Dept: RADIATION ONCOLOGY | Facility: HOSPITAL | Age: 70
End: 2020-01-01
Payer: COMMERCIAL

## 2020-01-01 ENCOUNTER — HOSPITAL ENCOUNTER (OUTPATIENT)
Facility: OTHER | Age: 70
Discharge: HOME | End: 2020-03-02
Attending: SURGERY | Admitting: SURGERY
Payer: COMMERCIAL

## 2020-01-01 ENCOUNTER — HOSPITAL ENCOUNTER (OUTPATIENT)
Dept: GENERAL RADIOLOGY | Facility: HOSPITAL | Age: 70
End: 2020-02-12
Attending: INTERNAL MEDICINE
Payer: COMMERCIAL

## 2020-01-01 ENCOUNTER — HOSPITAL ENCOUNTER (OUTPATIENT)
Facility: HOSPITAL | Age: 70
Discharge: HOME | End: 2020-02-12
Admitting: RADIOLOGY
Payer: COMMERCIAL

## 2020-01-01 ENCOUNTER — HOSPITAL ENCOUNTER (OUTPATIENT)
Dept: INFUSION THERAPY | Facility: OTHER | Age: 70
Discharge: HOME | End: 2020-02-18
Attending: INTERNAL MEDICINE | Admitting: INTERNAL MEDICINE
Payer: COMMERCIAL

## 2020-01-01 ENCOUNTER — HOSPITAL ENCOUNTER (OUTPATIENT)
Dept: INFUSION THERAPY | Facility: OTHER | Age: 70
Discharge: HOME | End: 2020-01-27
Attending: INTERNAL MEDICINE | Admitting: INTERNAL MEDICINE
Payer: COMMERCIAL

## 2020-01-01 ENCOUNTER — HOSPITAL ENCOUNTER (OUTPATIENT)
Dept: INFUSION THERAPY | Facility: OTHER | Age: 70
Discharge: HOME | End: 2020-01-29
Attending: NURSE PRACTITIONER | Admitting: NURSE PRACTITIONER
Payer: COMMERCIAL

## 2020-01-01 ENCOUNTER — HOSPITAL ENCOUNTER (OUTPATIENT)
Dept: RESPIRATORY THERAPY | Facility: OTHER | Age: 70
Discharge: HOME | End: 2020-01-09
Attending: INTERNAL MEDICINE | Admitting: INTERNAL MEDICINE
Payer: COMMERCIAL

## 2020-01-01 ENCOUNTER — HOSPITAL ENCOUNTER (OUTPATIENT)
Dept: INFUSION THERAPY | Facility: OTHER | Age: 70
Discharge: HOME | End: 2020-02-10
Attending: INTERNAL MEDICINE | Admitting: INTERNAL MEDICINE
Payer: COMMERCIAL

## 2020-01-01 ENCOUNTER — HOSPITAL ENCOUNTER (OUTPATIENT)
Dept: INFUSION THERAPY | Facility: OTHER | Age: 70
End: 2020-03-11
Attending: INTERNAL MEDICINE
Payer: COMMERCIAL

## 2020-01-01 ENCOUNTER — HOSPITAL ENCOUNTER (OUTPATIENT)
Dept: INFUSION THERAPY | Facility: OTHER | Age: 70
Discharge: HOME | End: 2020-08-10
Attending: INTERNAL MEDICINE | Admitting: INTERNAL MEDICINE
Payer: COMMERCIAL

## 2020-01-01 ENCOUNTER — HOSPITAL ENCOUNTER (OUTPATIENT)
Dept: INFUSION THERAPY | Facility: OTHER | Age: 70
Discharge: HOME | End: 2020-03-09
Attending: INTERNAL MEDICINE | Admitting: INTERNAL MEDICINE
Payer: COMMERCIAL

## 2020-01-01 ENCOUNTER — HOSPITAL ENCOUNTER (OUTPATIENT)
Dept: INFUSION THERAPY | Facility: OTHER | Age: 70
Discharge: HOME | End: 2020-06-26
Attending: INTERNAL MEDICINE | Admitting: INTERNAL MEDICINE
Payer: COMMERCIAL

## 2020-01-01 ENCOUNTER — NURSE TRIAGE (OUTPATIENT)
Dept: PEDIATRICS | Facility: OTHER | Age: 70
End: 2020-01-01

## 2020-01-01 ENCOUNTER — MYC REFILL (OUTPATIENT)
Dept: CARDIOLOGY | Facility: OTHER | Age: 70
End: 2020-01-01

## 2020-01-01 ENCOUNTER — TELEPHONE (OUTPATIENT)
Dept: PHYSICAL THERAPY | Facility: OTHER | Age: 70
End: 2020-01-01

## 2020-01-01 ENCOUNTER — HOSPITAL ENCOUNTER (OUTPATIENT)
Dept: MRI IMAGING | Facility: OTHER | Age: 70
Discharge: HOME | End: 2020-07-06
Attending: INTERNAL MEDICINE | Admitting: INTERNAL MEDICINE
Payer: COMMERCIAL

## 2020-01-01 ENCOUNTER — HOSPITAL ENCOUNTER (OUTPATIENT)
Dept: INFUSION THERAPY | Facility: OTHER | Age: 70
Discharge: HOME | End: 2020-03-10
Attending: INTERNAL MEDICINE | Admitting: INTERNAL MEDICINE
Payer: COMMERCIAL

## 2020-01-01 ENCOUNTER — HOSPITAL ENCOUNTER (OUTPATIENT)
Dept: PHYSICAL THERAPY | Facility: OTHER | Age: 70
Setting detail: THERAPIES SERIES
End: 2020-02-11
Attending: INTERNAL MEDICINE
Payer: COMMERCIAL

## 2020-01-01 ENCOUNTER — HOSPITAL ENCOUNTER (EMERGENCY)
Facility: OTHER | Age: 70
Discharge: HOME | End: 2020-02-23
Attending: INTERNAL MEDICINE | Admitting: INTERNAL MEDICINE
Payer: COMMERCIAL

## 2020-01-01 ENCOUNTER — HOSPITAL ENCOUNTER (OUTPATIENT)
Dept: INFUSION THERAPY | Facility: OTHER | Age: 70
Discharge: HOME | End: 2020-02-19
Attending: INTERNAL MEDICINE | Admitting: INTERNAL MEDICINE
Payer: COMMERCIAL

## 2020-01-01 ENCOUNTER — HOSPITAL ENCOUNTER (OUTPATIENT)
Dept: INFUSION THERAPY | Facility: OTHER | Age: 70
End: 2020-05-07
Attending: INTERNAL MEDICINE
Payer: COMMERCIAL

## 2020-01-01 ENCOUNTER — HOSPITAL ENCOUNTER (OUTPATIENT)
Dept: INFUSION THERAPY | Facility: OTHER | Age: 70
Discharge: HOME | End: 2020-01-20
Attending: INTERNAL MEDICINE | Admitting: INTERNAL MEDICINE
Payer: COMMERCIAL

## 2020-01-01 ENCOUNTER — HOSPITAL ENCOUNTER (OUTPATIENT)
Dept: MRI IMAGING | Facility: OTHER | Age: 70
Discharge: HOME | End: 2020-03-19
Attending: INTERNAL MEDICINE | Admitting: INTERNAL MEDICINE
Payer: COMMERCIAL

## 2020-01-01 ENCOUNTER — TELEPHONE (OUTPATIENT)
Dept: INFUSION THERAPY | Facility: OTHER | Age: 70
End: 2020-01-01

## 2020-01-01 ENCOUNTER — OFFICE VISIT (OUTPATIENT)
Dept: PULMONOLOGY | Facility: OTHER | Age: 70
End: 2020-01-01
Attending: INTERNAL MEDICINE
Payer: COMMERCIAL

## 2020-01-01 ENCOUNTER — HOSPITAL ENCOUNTER (OUTPATIENT)
Dept: INFUSION THERAPY | Facility: OTHER | Age: 70
End: 2020-07-15
Attending: INTERNAL MEDICINE
Payer: COMMERCIAL

## 2020-01-01 ENCOUNTER — HOSPITAL ENCOUNTER (OUTPATIENT)
Dept: INFUSION THERAPY | Facility: OTHER | Age: 70
Discharge: HOME | End: 2020-05-28
Attending: INTERNAL MEDICINE | Admitting: INTERNAL MEDICINE
Payer: COMMERCIAL

## 2020-01-01 ENCOUNTER — NURSE TRIAGE (OUTPATIENT)
Dept: NURSING | Facility: CLINIC | Age: 70
End: 2020-01-01

## 2020-01-01 ENCOUNTER — HOSPITAL ENCOUNTER (OUTPATIENT)
Dept: GENERAL RADIOLOGY | Facility: OTHER | Age: 70
End: 2020-07-15
Attending: FAMILY MEDICINE
Payer: COMMERCIAL

## 2020-01-01 ENCOUNTER — HOSPITAL ENCOUNTER (OUTPATIENT)
Dept: INFUSION THERAPY | Facility: OTHER | Age: 70
Discharge: HOME | End: 2020-01-02
Attending: INTERNAL MEDICINE | Admitting: INTERNAL MEDICINE
Payer: COMMERCIAL

## 2020-01-01 ENCOUNTER — HOSPITAL ENCOUNTER (OUTPATIENT)
Dept: INFUSION THERAPY | Facility: OTHER | Age: 70
Discharge: HOME | End: 2020-08-31
Attending: INTERNAL MEDICINE | Admitting: INTERNAL MEDICINE
Payer: COMMERCIAL

## 2020-01-01 ENCOUNTER — HOSPITAL ENCOUNTER (OUTPATIENT)
Dept: INFUSION THERAPY | Facility: OTHER | Age: 70
Discharge: HOME | End: 2020-05-27
Attending: INTERNAL MEDICINE | Admitting: INTERNAL MEDICINE
Payer: COMMERCIAL

## 2020-01-01 ENCOUNTER — HOSPITAL ENCOUNTER (OUTPATIENT)
Dept: PET IMAGING | Facility: OTHER | Age: 70
Discharge: HOME | End: 2020-03-18
Attending: INTERNAL MEDICINE | Admitting: INTERNAL MEDICINE
Payer: COMMERCIAL

## 2020-01-01 ENCOUNTER — HOSPITAL ENCOUNTER (OUTPATIENT)
Dept: INFUSION THERAPY | Facility: OTHER | Age: 70
Discharge: HOME | End: 2020-01-28
Attending: NURSE PRACTITIONER | Admitting: NURSE PRACTITIONER
Payer: COMMERCIAL

## 2020-01-01 ENCOUNTER — HOSPITAL ENCOUNTER (OUTPATIENT)
Dept: SPEECH THERAPY | Facility: HOSPITAL | Age: 70
Setting detail: THERAPIES SERIES
End: 2020-02-12
Attending: INTERNAL MEDICINE
Payer: COMMERCIAL

## 2020-01-01 ENCOUNTER — ONCOLOGY VISIT (OUTPATIENT)
Dept: ONCOLOGY | Facility: OTHER | Age: 70
End: 2020-01-01
Attending: NURSE PRACTITIONER
Payer: COMMERCIAL

## 2020-01-01 ENCOUNTER — HOSPITAL ENCOUNTER (OUTPATIENT)
Dept: INFUSION THERAPY | Facility: OTHER | Age: 70
Discharge: HOME | End: 2020-02-21
Attending: INTERNAL MEDICINE | Admitting: INTERNAL MEDICINE
Payer: COMMERCIAL

## 2020-01-01 ENCOUNTER — HOSPITAL ENCOUNTER (OUTPATIENT)
Dept: INFUSION THERAPY | Facility: OTHER | Age: 70
Discharge: HOME | End: 2020-09-08
Attending: INTERNAL MEDICINE | Admitting: INTERNAL MEDICINE
Payer: COMMERCIAL

## 2020-01-01 ENCOUNTER — TRANSFERRED RECORDS (OUTPATIENT)
Dept: HEALTH INFORMATION MANAGEMENT | Facility: CLINIC | Age: 70
End: 2020-01-01

## 2020-01-01 ENCOUNTER — HOSPITAL ENCOUNTER (OUTPATIENT)
Dept: INFUSION THERAPY | Facility: OTHER | Age: 70
Discharge: HOME | End: 2020-07-27
Attending: INTERNAL MEDICINE | Admitting: INTERNAL MEDICINE
Payer: COMMERCIAL

## 2020-01-01 ENCOUNTER — ANESTHESIA (OUTPATIENT)
Dept: SURGERY | Facility: OTHER | Age: 70
End: 2020-01-01
Payer: COMMERCIAL

## 2020-01-01 ENCOUNTER — HOSPITAL ENCOUNTER (OUTPATIENT)
Dept: INFUSION THERAPY | Facility: OTHER | Age: 70
Discharge: HOME | End: 2020-07-10
Attending: INTERNAL MEDICINE | Admitting: INTERNAL MEDICINE
Payer: COMMERCIAL

## 2020-01-01 ENCOUNTER — OFFICE VISIT (OUTPATIENT)
Dept: OTOLARYNGOLOGY | Facility: OTHER | Age: 70
End: 2020-01-01
Attending: RADIOLOGY
Payer: COMMERCIAL

## 2020-01-01 ENCOUNTER — HOSPITAL ENCOUNTER (OUTPATIENT)
Dept: INFUSION THERAPY | Facility: OTHER | Age: 70
End: 2020-08-17
Attending: INTERNAL MEDICINE
Payer: COMMERCIAL

## 2020-01-01 ENCOUNTER — HOSPITAL ENCOUNTER (OUTPATIENT)
Dept: NUCLEAR MEDICINE | Facility: OTHER | Age: 70
Setting detail: NUCLEAR MEDICINE
End: 2020-07-07
Attending: INTERNAL MEDICINE
Payer: COMMERCIAL

## 2020-01-01 ENCOUNTER — HOSPITAL ENCOUNTER (OUTPATIENT)
Dept: INFUSION THERAPY | Facility: OTHER | Age: 70
Discharge: HOME | End: 2020-07-20
Attending: INTERNAL MEDICINE | Admitting: INTERNAL MEDICINE
Payer: COMMERCIAL

## 2020-01-01 ENCOUNTER — TELEPHONE (OUTPATIENT)
Dept: OTOLARYNGOLOGY | Facility: OTHER | Age: 70
End: 2020-01-01

## 2020-01-01 ENCOUNTER — DOCUMENTATION ONLY (OUTPATIENT)
Dept: RADIATION ONCOLOGY | Facility: HOSPITAL | Age: 70
End: 2020-01-01

## 2020-01-01 ENCOUNTER — HOSPITAL ENCOUNTER (OUTPATIENT)
Dept: INFUSION THERAPY | Facility: OTHER | Age: 70
Discharge: HOME | End: 2020-02-07
Attending: INTERNAL MEDICINE | Admitting: INTERNAL MEDICINE
Payer: COMMERCIAL

## 2020-01-01 ENCOUNTER — HOSPITAL ENCOUNTER (OUTPATIENT)
Dept: INFUSION THERAPY | Facility: OTHER | Age: 70
Discharge: HOME | End: 2020-03-30
Attending: INTERNAL MEDICINE | Admitting: INTERNAL MEDICINE
Payer: COMMERCIAL

## 2020-01-01 ENCOUNTER — HOSPITAL ENCOUNTER (OUTPATIENT)
Dept: INFUSION THERAPY | Facility: OTHER | Age: 70
Discharge: HOME | End: 2020-08-24
Attending: INTERNAL MEDICINE | Admitting: INTERNAL MEDICINE
Payer: COMMERCIAL

## 2020-01-01 ENCOUNTER — OFFICE VISIT (OUTPATIENT)
Dept: CARDIOLOGY | Facility: OTHER | Age: 70
End: 2020-01-01
Attending: INTERNAL MEDICINE
Payer: COMMERCIAL

## 2020-01-01 ENCOUNTER — HOSPITAL ENCOUNTER (OUTPATIENT)
Dept: INFUSION THERAPY | Facility: OTHER | Age: 70
End: 2020-04-02
Attending: INTERNAL MEDICINE
Payer: COMMERCIAL

## 2020-01-01 VITALS
HEART RATE: 60 BPM | TEMPERATURE: 98.5 F | SYSTOLIC BLOOD PRESSURE: 112 MMHG | RESPIRATION RATE: 16 BRPM | WEIGHT: 167 LBS | DIASTOLIC BLOOD PRESSURE: 68 MMHG | BODY MASS INDEX: 24.65 KG/M2

## 2020-01-01 VITALS
TEMPERATURE: 98.5 F | DIASTOLIC BLOOD PRESSURE: 95 MMHG | RESPIRATION RATE: 16 BRPM | WEIGHT: 175 LBS | SYSTOLIC BLOOD PRESSURE: 153 MMHG | HEART RATE: 60 BPM | BODY MASS INDEX: 25.84 KG/M2

## 2020-01-01 VITALS
RESPIRATION RATE: 16 BRPM | TEMPERATURE: 97.7 F | DIASTOLIC BLOOD PRESSURE: 85 MMHG | WEIGHT: 172.4 LBS | SYSTOLIC BLOOD PRESSURE: 133 MMHG | BODY MASS INDEX: 26.6 KG/M2 | HEART RATE: 87 BPM

## 2020-01-01 VITALS
TEMPERATURE: 98.8 F | OXYGEN SATURATION: 100 % | SYSTOLIC BLOOD PRESSURE: 142 MMHG | RESPIRATION RATE: 18 BRPM | BODY MASS INDEX: 28.39 KG/M2 | WEIGHT: 184 LBS | HEART RATE: 58 BPM | DIASTOLIC BLOOD PRESSURE: 92 MMHG

## 2020-01-01 VITALS — WEIGHT: 179 LBS | BODY MASS INDEX: 27.62 KG/M2

## 2020-01-01 VITALS
HEART RATE: 87 BPM | TEMPERATURE: 96.9 F | SYSTOLIC BLOOD PRESSURE: 131 MMHG | RESPIRATION RATE: 16 BRPM | DIASTOLIC BLOOD PRESSURE: 94 MMHG

## 2020-01-01 VITALS
HEART RATE: 69 BPM | SYSTOLIC BLOOD PRESSURE: 151 MMHG | RESPIRATION RATE: 18 BRPM | TEMPERATURE: 97.5 F | DIASTOLIC BLOOD PRESSURE: 90 MMHG | BODY MASS INDEX: 26.99 KG/M2 | WEIGHT: 176.2 LBS

## 2020-01-01 VITALS
BODY MASS INDEX: 25.12 KG/M2 | HEART RATE: 72 BPM | WEIGHT: 164 LBS | RESPIRATION RATE: 18 BRPM | DIASTOLIC BLOOD PRESSURE: 54 MMHG | TEMPERATURE: 98 F | SYSTOLIC BLOOD PRESSURE: 116 MMHG

## 2020-01-01 VITALS
HEART RATE: 60 BPM | RESPIRATION RATE: 16 BRPM | SYSTOLIC BLOOD PRESSURE: 86 MMHG | WEIGHT: 166 LBS | DIASTOLIC BLOOD PRESSURE: 62 MMHG | TEMPERATURE: 97.5 F | BODY MASS INDEX: 24.5 KG/M2

## 2020-01-01 VITALS — HEART RATE: 79 BPM | SYSTOLIC BLOOD PRESSURE: 122 MMHG | DIASTOLIC BLOOD PRESSURE: 78 MMHG

## 2020-01-01 VITALS
RESPIRATION RATE: 18 BRPM | DIASTOLIC BLOOD PRESSURE: 71 MMHG | WEIGHT: 177.6 LBS | TEMPERATURE: 98.5 F | BODY MASS INDEX: 27.41 KG/M2 | SYSTOLIC BLOOD PRESSURE: 103 MMHG | HEART RATE: 72 BPM

## 2020-01-01 VITALS
DIASTOLIC BLOOD PRESSURE: 74 MMHG | SYSTOLIC BLOOD PRESSURE: 119 MMHG | BODY MASS INDEX: 25.59 KG/M2 | TEMPERATURE: 99 F | WEIGHT: 173.4 LBS | HEART RATE: 62 BPM | RESPIRATION RATE: 16 BRPM

## 2020-01-01 VITALS
HEART RATE: 64 BPM | BODY MASS INDEX: 24.96 KG/M2 | SYSTOLIC BLOOD PRESSURE: 117 MMHG | WEIGHT: 163 LBS | DIASTOLIC BLOOD PRESSURE: 75 MMHG | RESPIRATION RATE: 16 BRPM | TEMPERATURE: 97.5 F

## 2020-01-01 VITALS
BODY MASS INDEX: 26.31 KG/M2 | HEIGHT: 69 IN | WEIGHT: 177.6 LBS | OXYGEN SATURATION: 97 % | DIASTOLIC BLOOD PRESSURE: 80 MMHG | SYSTOLIC BLOOD PRESSURE: 118 MMHG | HEART RATE: 58 BPM | RESPIRATION RATE: 16 BRPM

## 2020-01-01 VITALS
RESPIRATION RATE: 18 BRPM | DIASTOLIC BLOOD PRESSURE: 62 MMHG | SYSTOLIC BLOOD PRESSURE: 90 MMHG | TEMPERATURE: 99.1 F | HEART RATE: 61 BPM

## 2020-01-01 VITALS
OXYGEN SATURATION: 98 % | HEIGHT: 69 IN | DIASTOLIC BLOOD PRESSURE: 76 MMHG | WEIGHT: 181 LBS | HEART RATE: 62 BPM | TEMPERATURE: 97.9 F | SYSTOLIC BLOOD PRESSURE: 116 MMHG | RESPIRATION RATE: 16 BRPM | BODY MASS INDEX: 26.81 KG/M2

## 2020-01-01 VITALS
DIASTOLIC BLOOD PRESSURE: 86 MMHG | TEMPERATURE: 97.6 F | HEART RATE: 78 BPM | SYSTOLIC BLOOD PRESSURE: 130 MMHG | BODY MASS INDEX: 23.95 KG/M2 | OXYGEN SATURATION: 97 % | RESPIRATION RATE: 16 BRPM | WEIGHT: 156.4 LBS

## 2020-01-01 VITALS
DIASTOLIC BLOOD PRESSURE: 60 MMHG | BODY MASS INDEX: 26.16 KG/M2 | WEIGHT: 170.8 LBS | RESPIRATION RATE: 16 BRPM | TEMPERATURE: 98 F | SYSTOLIC BLOOD PRESSURE: 137 MMHG | HEART RATE: 70 BPM

## 2020-01-01 VITALS
BODY MASS INDEX: 27.41 KG/M2 | SYSTOLIC BLOOD PRESSURE: 157 MMHG | DIASTOLIC BLOOD PRESSURE: 80 MMHG | TEMPERATURE: 97.2 F | HEART RATE: 61 BPM | WEIGHT: 177.6 LBS | RESPIRATION RATE: 16 BRPM

## 2020-01-01 VITALS
WEIGHT: 176 LBS | BODY MASS INDEX: 27.16 KG/M2 | HEART RATE: 67 BPM | DIASTOLIC BLOOD PRESSURE: 84 MMHG | RESPIRATION RATE: 14 BRPM | OXYGEN SATURATION: 95 % | SYSTOLIC BLOOD PRESSURE: 142 MMHG | TEMPERATURE: 97.9 F

## 2020-01-01 VITALS
DIASTOLIC BLOOD PRESSURE: 74 MMHG | SYSTOLIC BLOOD PRESSURE: 129 MMHG | HEART RATE: 64 BPM | RESPIRATION RATE: 18 BRPM | TEMPERATURE: 96.3 F

## 2020-01-01 VITALS
SYSTOLIC BLOOD PRESSURE: 116 MMHG | DIASTOLIC BLOOD PRESSURE: 70 MMHG | WEIGHT: 180.1 LBS | HEART RATE: 64 BPM | RESPIRATION RATE: 16 BRPM | BODY MASS INDEX: 26.58 KG/M2

## 2020-01-01 VITALS
HEIGHT: 69 IN | SYSTOLIC BLOOD PRESSURE: 118 MMHG | BODY MASS INDEX: 26.6 KG/M2 | DIASTOLIC BLOOD PRESSURE: 80 MMHG | RESPIRATION RATE: 16 BRPM | WEIGHT: 179.6 LBS | HEART RATE: 60 BPM

## 2020-01-01 VITALS — HEART RATE: 80 BPM | DIASTOLIC BLOOD PRESSURE: 83 MMHG | SYSTOLIC BLOOD PRESSURE: 136 MMHG

## 2020-01-01 VITALS
DIASTOLIC BLOOD PRESSURE: 63 MMHG | HEART RATE: 70 BPM | TEMPERATURE: 97.7 F | RESPIRATION RATE: 16 BRPM | SYSTOLIC BLOOD PRESSURE: 90 MMHG

## 2020-01-01 VITALS
RESPIRATION RATE: 20 BRPM | DIASTOLIC BLOOD PRESSURE: 77 MMHG | WEIGHT: 156.2 LBS | BODY MASS INDEX: 23.92 KG/M2 | SYSTOLIC BLOOD PRESSURE: 119 MMHG | HEART RATE: 75 BPM

## 2020-01-01 VITALS
SYSTOLIC BLOOD PRESSURE: 110 MMHG | TEMPERATURE: 98.2 F | RESPIRATION RATE: 18 BRPM | BODY MASS INDEX: 28.24 KG/M2 | DIASTOLIC BLOOD PRESSURE: 82 MMHG | TEMPERATURE: 98.8 F | SYSTOLIC BLOOD PRESSURE: 116 MMHG | HEART RATE: 82 BPM | OXYGEN SATURATION: 99 % | HEART RATE: 63 BPM | DIASTOLIC BLOOD PRESSURE: 64 MMHG | RESPIRATION RATE: 20 BRPM | WEIGHT: 183 LBS

## 2020-01-01 VITALS
BODY MASS INDEX: 26.24 KG/M2 | SYSTOLIC BLOOD PRESSURE: 112 MMHG | HEART RATE: 72 BPM | DIASTOLIC BLOOD PRESSURE: 68 MMHG | WEIGHT: 177.8 LBS | RESPIRATION RATE: 16 BRPM

## 2020-01-01 VITALS
RESPIRATION RATE: 18 BRPM | DIASTOLIC BLOOD PRESSURE: 71 MMHG | TEMPERATURE: 97.1 F | HEART RATE: 61 BPM | SYSTOLIC BLOOD PRESSURE: 136 MMHG

## 2020-01-01 VITALS — TEMPERATURE: 97.6 F | SYSTOLIC BLOOD PRESSURE: 122 MMHG | HEART RATE: 68 BPM | DIASTOLIC BLOOD PRESSURE: 75 MMHG

## 2020-01-01 VITALS
RESPIRATION RATE: 16 BRPM | HEIGHT: 68 IN | WEIGHT: 163.4 LBS | BODY MASS INDEX: 24.77 KG/M2 | DIASTOLIC BLOOD PRESSURE: 62 MMHG | SYSTOLIC BLOOD PRESSURE: 122 MMHG | TEMPERATURE: 97.5 F | HEART RATE: 64 BPM

## 2020-01-01 VITALS
HEART RATE: 58 BPM | RESPIRATION RATE: 16 BRPM | SYSTOLIC BLOOD PRESSURE: 148 MMHG | TEMPERATURE: 97.9 F | DIASTOLIC BLOOD PRESSURE: 72 MMHG

## 2020-01-01 VITALS
WEIGHT: 165 LBS | OXYGEN SATURATION: 99 % | SYSTOLIC BLOOD PRESSURE: 163 MMHG | BODY MASS INDEX: 24.44 KG/M2 | DIASTOLIC BLOOD PRESSURE: 97 MMHG | TEMPERATURE: 97.3 F | RESPIRATION RATE: 18 BRPM | HEART RATE: 60 BPM | HEIGHT: 69 IN

## 2020-01-01 VITALS
DIASTOLIC BLOOD PRESSURE: 83 MMHG | HEART RATE: 72 BPM | RESPIRATION RATE: 18 BRPM | DIASTOLIC BLOOD PRESSURE: 79 MMHG | WEIGHT: 165 LBS | TEMPERATURE: 96.2 F | SYSTOLIC BLOOD PRESSURE: 145 MMHG | WEIGHT: 180 LBS | BODY MASS INDEX: 25.27 KG/M2 | RESPIRATION RATE: 18 BRPM | HEART RATE: 50 BPM | SYSTOLIC BLOOD PRESSURE: 145 MMHG | TEMPERATURE: 97.3 F | BODY MASS INDEX: 26.57 KG/M2

## 2020-01-01 VITALS
RESPIRATION RATE: 18 BRPM | TEMPERATURE: 99.1 F | SYSTOLIC BLOOD PRESSURE: 111 MMHG | DIASTOLIC BLOOD PRESSURE: 67 MMHG | HEART RATE: 67 BPM

## 2020-01-01 VITALS
WEIGHT: 178 LBS | BODY MASS INDEX: 26.98 KG/M2 | HEART RATE: 80 BPM | TEMPERATURE: 96.6 F | DIASTOLIC BLOOD PRESSURE: 70 MMHG | SYSTOLIC BLOOD PRESSURE: 124 MMHG | HEIGHT: 68 IN | OXYGEN SATURATION: 98 %

## 2020-01-01 VITALS
DIASTOLIC BLOOD PRESSURE: 76 MMHG | SYSTOLIC BLOOD PRESSURE: 135 MMHG | BODY MASS INDEX: 25.61 KG/M2 | WEIGHT: 167.2 LBS | HEART RATE: 70 BPM | TEMPERATURE: 97.2 F | RESPIRATION RATE: 16 BRPM

## 2020-01-01 VITALS
TEMPERATURE: 98.2 F | BODY MASS INDEX: 24.25 KG/M2 | WEIGHT: 160 LBS | HEIGHT: 68 IN | SYSTOLIC BLOOD PRESSURE: 142 MMHG | OXYGEN SATURATION: 93 % | DIASTOLIC BLOOD PRESSURE: 96 MMHG | HEART RATE: 67 BPM | RESPIRATION RATE: 18 BRPM

## 2020-01-01 VITALS
SYSTOLIC BLOOD PRESSURE: 142 MMHG | HEIGHT: 68 IN | HEART RATE: 76 BPM | WEIGHT: 178.8 LBS | OXYGEN SATURATION: 98 % | RESPIRATION RATE: 18 BRPM | BODY MASS INDEX: 27.1 KG/M2 | TEMPERATURE: 98.4 F | DIASTOLIC BLOOD PRESSURE: 92 MMHG

## 2020-01-01 VITALS
DIASTOLIC BLOOD PRESSURE: 68 MMHG | OXYGEN SATURATION: 98 % | SYSTOLIC BLOOD PRESSURE: 112 MMHG | RESPIRATION RATE: 18 BRPM | HEART RATE: 88 BPM | TEMPERATURE: 97.6 F

## 2020-01-01 VITALS
SYSTOLIC BLOOD PRESSURE: 147 MMHG | DIASTOLIC BLOOD PRESSURE: 94 MMHG | BODY MASS INDEX: 25.92 KG/M2 | TEMPERATURE: 98 F | RESPIRATION RATE: 18 BRPM | WEIGHT: 168 LBS | HEART RATE: 61 BPM

## 2020-01-01 VITALS
TEMPERATURE: 97.3 F | SYSTOLIC BLOOD PRESSURE: 170 MMHG | RESPIRATION RATE: 16 BRPM | HEART RATE: 58 BPM | DIASTOLIC BLOOD PRESSURE: 103 MMHG | OXYGEN SATURATION: 98 %

## 2020-01-01 VITALS
HEART RATE: 64 BPM | WEIGHT: 165 LBS | TEMPERATURE: 98.1 F | DIASTOLIC BLOOD PRESSURE: 84 MMHG | SYSTOLIC BLOOD PRESSURE: 148 MMHG | BODY MASS INDEX: 24.35 KG/M2 | RESPIRATION RATE: 18 BRPM

## 2020-01-01 VITALS
SYSTOLIC BLOOD PRESSURE: 126 MMHG | WEIGHT: 169 LBS | HEIGHT: 69 IN | TEMPERATURE: 96.8 F | DIASTOLIC BLOOD PRESSURE: 88 MMHG | BODY MASS INDEX: 25.03 KG/M2 | OXYGEN SATURATION: 99 % | RESPIRATION RATE: 16 BRPM | HEART RATE: 74 BPM

## 2020-01-01 VITALS
HEART RATE: 64 BPM | BODY MASS INDEX: 25.91 KG/M2 | WEIGHT: 169.2 LBS | DIASTOLIC BLOOD PRESSURE: 81 MMHG | SYSTOLIC BLOOD PRESSURE: 132 MMHG | TEMPERATURE: 98 F | RESPIRATION RATE: 16 BRPM

## 2020-01-01 VITALS
SYSTOLIC BLOOD PRESSURE: 126 MMHG | DIASTOLIC BLOOD PRESSURE: 67 MMHG | RESPIRATION RATE: 18 BRPM | TEMPERATURE: 98 F | HEART RATE: 74 BPM

## 2020-01-01 VITALS
HEART RATE: 55 BPM | RESPIRATION RATE: 16 BRPM | TEMPERATURE: 98.4 F | SYSTOLIC BLOOD PRESSURE: 111 MMHG | DIASTOLIC BLOOD PRESSURE: 67 MMHG | WEIGHT: 167.4 LBS | BODY MASS INDEX: 24.71 KG/M2

## 2020-01-01 VITALS
SYSTOLIC BLOOD PRESSURE: 127 MMHG | RESPIRATION RATE: 14 BRPM | HEART RATE: 74 BPM | BODY MASS INDEX: 24.01 KG/M2 | DIASTOLIC BLOOD PRESSURE: 82 MMHG | WEIGHT: 156.8 LBS | TEMPERATURE: 96.2 F

## 2020-01-01 VITALS — DIASTOLIC BLOOD PRESSURE: 88 MMHG | SYSTOLIC BLOOD PRESSURE: 124 MMHG | HEART RATE: 89 BPM

## 2020-01-01 VITALS
TEMPERATURE: 96.9 F | SYSTOLIC BLOOD PRESSURE: 116 MMHG | RESPIRATION RATE: 16 BRPM | DIASTOLIC BLOOD PRESSURE: 71 MMHG | HEART RATE: 70 BPM

## 2020-01-01 VITALS — BODY MASS INDEX: 28.81 KG/M2 | WEIGHT: 186.7 LBS

## 2020-01-01 VITALS
WEIGHT: 174 LBS | SYSTOLIC BLOOD PRESSURE: 122 MMHG | BODY MASS INDEX: 25.7 KG/M2 | RESPIRATION RATE: 16 BRPM | HEART RATE: 56 BPM | DIASTOLIC BLOOD PRESSURE: 70 MMHG

## 2020-01-01 VITALS — SYSTOLIC BLOOD PRESSURE: 92 MMHG | HEART RATE: 87 BPM | DIASTOLIC BLOOD PRESSURE: 67 MMHG

## 2020-01-01 DIAGNOSIS — E87.6 HYPOKALEMIA: Primary | ICD-10-CM

## 2020-01-01 DIAGNOSIS — C79.31 METASTATIC SMALL CELL CARCINOMA TO BRAIN (H): Primary | ICD-10-CM

## 2020-01-01 DIAGNOSIS — C34.90 MALIGNANT NEOPLASM OF LUNG, UNSPECIFIED LATERALITY, UNSPECIFIED PART OF LUNG (H): ICD-10-CM

## 2020-01-01 DIAGNOSIS — C79.31 METASTATIC SMALL CELL CARCINOMA TO BRAIN (H): ICD-10-CM

## 2020-01-01 DIAGNOSIS — C34.02 MALIGNANT NEOPLASM OF HILUS OF LEFT LUNG (H): Primary | ICD-10-CM

## 2020-01-01 DIAGNOSIS — G47.39 COMPLEX SLEEP APNEA SYNDROME: ICD-10-CM

## 2020-01-01 DIAGNOSIS — G62.0 CHEMOTHERAPY-INDUCED NEUROPATHY (H): ICD-10-CM

## 2020-01-01 DIAGNOSIS — D84.821 IMMUNOCOMPROMISED STATE DUE TO DRUG THERAPY (H): ICD-10-CM

## 2020-01-01 DIAGNOSIS — C34.90 MALIGNANT NEOPLASM OF LUNG, UNSPECIFIED LATERALITY, UNSPECIFIED PART OF LUNG (H): Primary | ICD-10-CM

## 2020-01-01 DIAGNOSIS — C34.02 MALIGNANT NEOPLASM OF HILUS OF LEFT LUNG (H): ICD-10-CM

## 2020-01-01 DIAGNOSIS — H90.5 SENSORINEURAL HEARING LOSS (SNHL), UNSPECIFIED LATERALITY: ICD-10-CM

## 2020-01-01 DIAGNOSIS — C79.51 METASTATIC SMALL CELL CARCINOMA TO BONE (H): ICD-10-CM

## 2020-01-01 DIAGNOSIS — G89.3 CANCER ASSOCIATED PAIN: ICD-10-CM

## 2020-01-01 DIAGNOSIS — L27.0 DRUG-INDUCED SKIN RASH: Primary | ICD-10-CM

## 2020-01-01 DIAGNOSIS — B37.2 CANDIDIASIS OF SKIN: ICD-10-CM

## 2020-01-01 DIAGNOSIS — C79.51 BONE METASTASIS: Primary | ICD-10-CM

## 2020-01-01 DIAGNOSIS — T45.1X5A CHEMOTHERAPY-INDUCED NEUTROPENIA (H): ICD-10-CM

## 2020-01-01 DIAGNOSIS — I48.0 PAROXYSMAL ATRIAL FIBRILLATION (H): ICD-10-CM

## 2020-01-01 DIAGNOSIS — R63.0 ANOREXIA: ICD-10-CM

## 2020-01-01 DIAGNOSIS — E86.1 HYPOVOLEMIA: ICD-10-CM

## 2020-01-01 DIAGNOSIS — I10 BENIGN ESSENTIAL HYPERTENSION: Chronic | ICD-10-CM

## 2020-01-01 DIAGNOSIS — D70.1 CHEMOTHERAPY-INDUCED NEUTROPENIA (H): ICD-10-CM

## 2020-01-01 DIAGNOSIS — L27.0 DRUG RASH: Primary | ICD-10-CM

## 2020-01-01 DIAGNOSIS — I71.21 ASCENDING AORTIC ANEURYSM (H): ICD-10-CM

## 2020-01-01 DIAGNOSIS — I10 BENIGN ESSENTIAL HYPERTENSION: Primary | Chronic | ICD-10-CM

## 2020-01-01 DIAGNOSIS — H60.63 CHRONIC NON-INFECTIVE OTITIS EXTERNA OF BOTH EARS, UNSPECIFIED TYPE: ICD-10-CM

## 2020-01-01 DIAGNOSIS — G62.9 NEUROPATHY: Primary | ICD-10-CM

## 2020-01-01 DIAGNOSIS — R11.0 NAUSEA: ICD-10-CM

## 2020-01-01 DIAGNOSIS — Z85.118 PERSONAL HISTORY OF LUNG CANCER: Primary | ICD-10-CM

## 2020-01-01 DIAGNOSIS — R10.84 ABDOMINAL PAIN, GENERALIZED: ICD-10-CM

## 2020-01-01 DIAGNOSIS — D61.810 ANTINEOPLASTIC CHEMOTHERAPY INDUCED PANCYTOPENIA (H): ICD-10-CM

## 2020-01-01 DIAGNOSIS — E87.6 HYPOKALEMIA: ICD-10-CM
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
## 2020-01-01 DIAGNOSIS — L27.0 DRUG RASH: ICD-10-CM

## 2020-01-01 DIAGNOSIS — R79.89 ELEVATED PROCALCITONIN: ICD-10-CM

## 2020-01-01 DIAGNOSIS — T45.1X5A CHEMOTHERAPY-INDUCED NEUROPATHY (H): Primary | ICD-10-CM

## 2020-01-01 DIAGNOSIS — C79.51 METASTATIC SMALL CELL CARCINOMA TO BONE (H): Primary | ICD-10-CM

## 2020-01-01 DIAGNOSIS — C34.90 SMALL CELL LUNG CANCER (H): ICD-10-CM
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
## 2020-01-01 DIAGNOSIS — H81.12 BENIGN PAROXYSMAL POSITIONAL VERTIGO OF LEFT EAR: Primary | ICD-10-CM

## 2020-01-01 DIAGNOSIS — R94.39 ABNORMAL STRESS TEST: ICD-10-CM

## 2020-01-01 DIAGNOSIS — C79.51 BONE METASTASIS: ICD-10-CM

## 2020-01-01 DIAGNOSIS — G62.9 NEUROPATHY: ICD-10-CM

## 2020-01-01 DIAGNOSIS — K20.80 RADIATION-INDUCED ESOPHAGITIS: Primary | ICD-10-CM

## 2020-01-01 DIAGNOSIS — R10.13 EPIGASTRIC PAIN: Primary | ICD-10-CM

## 2020-01-01 DIAGNOSIS — M21.372 FOOT DROP, LEFT FOOT: ICD-10-CM

## 2020-01-01 DIAGNOSIS — E78.2 MIXED HYPERLIPIDEMIA: ICD-10-CM

## 2020-01-01 DIAGNOSIS — Z79.899 IMMUNOCOMPROMISED STATE DUE TO DRUG THERAPY (H): ICD-10-CM

## 2020-01-01 DIAGNOSIS — I48.91 NEW ONSET A-FIB (H): Primary | ICD-10-CM

## 2020-01-01 DIAGNOSIS — T45.1X5A CHEMOTHERAPY-INDUCED NEUROPATHY (H): ICD-10-CM

## 2020-01-01 DIAGNOSIS — G62.0 CHEMOTHERAPY-INDUCED NEUROPATHY (H): Primary | ICD-10-CM

## 2020-01-01 DIAGNOSIS — T66.XXXA RADIATION-INDUCED ESOPHAGITIS: Primary | ICD-10-CM

## 2020-01-01 DIAGNOSIS — K21.00 GASTROESOPHAGEAL REFLUX DISEASE WITH ESOPHAGITIS: ICD-10-CM

## 2020-01-01 DIAGNOSIS — C34.90 SMALL CELL CARCINOMA OF LUNG (H): ICD-10-CM

## 2020-01-01 DIAGNOSIS — R05.9 COUGH: Primary | ICD-10-CM

## 2020-01-01 DIAGNOSIS — R05.9 COUGH: ICD-10-CM

## 2020-01-01 DIAGNOSIS — R63.0 LOSS OF APPETITE: ICD-10-CM

## 2020-01-01 DIAGNOSIS — R10.84 ABDOMINAL PAIN, GENERALIZED: Primary | ICD-10-CM

## 2020-01-01 DIAGNOSIS — G47.33 OSA ON CPAP: Primary | ICD-10-CM

## 2020-01-01 DIAGNOSIS — L27.0 DRUG-INDUCED SKIN RASH: ICD-10-CM

## 2020-01-01 DIAGNOSIS — Z72.0 TOBACCO ABUSE: ICD-10-CM

## 2020-01-01 DIAGNOSIS — E78.2 MIXED HYPERLIPIDEMIA: Primary | ICD-10-CM

## 2020-01-01 DIAGNOSIS — Z79.01 ON CONTINUOUS ORAL ANTICOAGULATION: ICD-10-CM

## 2020-01-01 DIAGNOSIS — R13.10 DYSPHAGIA, UNSPECIFIED TYPE: Primary | ICD-10-CM

## 2020-01-01 DIAGNOSIS — Z71.6 TOBACCO ABUSE COUNSELING: ICD-10-CM

## 2020-01-01 DIAGNOSIS — R63.4 WEIGHT LOSS: ICD-10-CM

## 2020-01-01 DIAGNOSIS — I10 BENIGN ESSENTIAL HYPERTENSION: Primary | ICD-10-CM

## 2020-01-01 DIAGNOSIS — B37.2 CANDIDIASIS OF SKIN: Primary | ICD-10-CM

## 2020-01-01 DIAGNOSIS — I51.7 ASYMMETRIC SEPTAL HYPERTROPHY: ICD-10-CM

## 2020-01-01 DIAGNOSIS — R13.10 DYSPHAGIA, UNSPECIFIED TYPE: ICD-10-CM

## 2020-01-01 DIAGNOSIS — T45.1X5A ANTINEOPLASTIC CHEMOTHERAPY INDUCED PANCYTOPENIA (H): ICD-10-CM

## 2020-01-01 LAB
ALBUMIN SERPL-MCNC: 3.5 G/DL (ref 3.5–5.7)
ALBUMIN SERPL-MCNC: 3.5 G/DL (ref 3.5–5.7)
ALBUMIN SERPL-MCNC: 3.6 G/DL (ref 3.5–5.7)
ALBUMIN SERPL-MCNC: 3.7 G/DL (ref 3.5–5.7)
ALBUMIN SERPL-MCNC: 3.8 G/DL (ref 3.5–5.7)
ALBUMIN SERPL-MCNC: 3.9 G/DL (ref 3.5–5.7)
ALBUMIN SERPL-MCNC: 3.9 G/DL (ref 3.5–5.7)
ALBUMIN SERPL-MCNC: 4 G/DL (ref 3.5–5.7)
ALBUMIN SERPL-MCNC: 4.1 G/DL (ref 3.5–5.7)
ALBUMIN UR-MCNC: 30 MG/DL
ALP SERPL-CCNC: 108 U/L (ref 34–104)
ALP SERPL-CCNC: 55 U/L (ref 34–104)
ALP SERPL-CCNC: 56 U/L (ref 34–104)
ALP SERPL-CCNC: 67 U/L (ref 34–104)
ALP SERPL-CCNC: 70 U/L (ref 34–104)
ALP SERPL-CCNC: 71 U/L (ref 34–104)
ALP SERPL-CCNC: 76 U/L (ref 34–104)
ALP SERPL-CCNC: 76 U/L (ref 34–104)
ALP SERPL-CCNC: 79 U/L (ref 34–104)
ALP SERPL-CCNC: 80 U/L (ref 34–104)
ALP SERPL-CCNC: 84 U/L (ref 34–104)
ALP SERPL-CCNC: 85 U/L (ref 34–104)
ALP SERPL-CCNC: 86 U/L (ref 34–104)
ALP SERPL-CCNC: 89 U/L (ref 34–104)
ALP SERPL-CCNC: 94 U/L (ref 34–104)
ALP SERPL-CCNC: 96 U/L (ref 34–104)
ALP SERPL-CCNC: 96 U/L (ref 34–104)
ALP SERPL-CCNC: 98 U/L (ref 34–104)
ALT SERPL W P-5'-P-CCNC: 12 U/L (ref 7–52)
ALT SERPL W P-5'-P-CCNC: 12 U/L (ref 7–52)
ALT SERPL W P-5'-P-CCNC: 13 U/L (ref 7–52)
ALT SERPL W P-5'-P-CCNC: 13 U/L (ref 7–52)
ALT SERPL W P-5'-P-CCNC: 14 U/L (ref 7–52)
ALT SERPL W P-5'-P-CCNC: 15 U/L (ref 7–52)
ALT SERPL W P-5'-P-CCNC: 15 U/L (ref 7–52)
ALT SERPL W P-5'-P-CCNC: 17 U/L (ref 7–52)
ALT SERPL W P-5'-P-CCNC: 19 U/L (ref 7–52)
ALT SERPL W P-5'-P-CCNC: 20 U/L (ref 7–52)
ALT SERPL W P-5'-P-CCNC: 22 U/L (ref 7–52)
ALT SERPL W P-5'-P-CCNC: 23 U/L (ref 7–52)
ALT SERPL W P-5'-P-CCNC: 23 U/L (ref 7–52)
ALT SERPL W P-5'-P-CCNC: 32 U/L (ref 7–52)
ALT SERPL W P-5'-P-CCNC: 33 U/L (ref 7–52)
ALT SERPL W P-5'-P-CCNC: 35 U/L (ref 7–52)
ALT SERPL W P-5'-P-CCNC: 63 U/L (ref 7–52)
ALT SERPL W P-5'-P-CCNC: <3 U/L (ref 7–52)
ANION GAP SERPL CALCULATED.3IONS-SCNC: 10 MMOL/L (ref 3–14)
ANION GAP SERPL CALCULATED.3IONS-SCNC: 13 MMOL/L (ref 3–14)
ANION GAP SERPL CALCULATED.3IONS-SCNC: 13 MMOL/L (ref 3–14)
ANION GAP SERPL CALCULATED.3IONS-SCNC: 4 MMOL/L (ref 3–14)
ANION GAP SERPL CALCULATED.3IONS-SCNC: 5 MMOL/L (ref 3–14)
ANION GAP SERPL CALCULATED.3IONS-SCNC: 5 MMOL/L (ref 3–14)
ANION GAP SERPL CALCULATED.3IONS-SCNC: 8 MMOL/L (ref 3–14)
ANION GAP SERPL CALCULATED.3IONS-SCNC: 9 MMOL/L (ref 3–14)
APPEARANCE UR: CLEAR
AST SERPL W P-5'-P-CCNC: 10 U/L (ref 13–39)
AST SERPL W P-5'-P-CCNC: 13 U/L (ref 13–39)
AST SERPL W P-5'-P-CCNC: 13 U/L (ref 13–39)
AST SERPL W P-5'-P-CCNC: 14 U/L (ref 13–39)
AST SERPL W P-5'-P-CCNC: 14 U/L (ref 13–39)
AST SERPL W P-5'-P-CCNC: 15 U/L (ref 13–39)
AST SERPL W P-5'-P-CCNC: 16 U/L (ref 13–39)
AST SERPL W P-5'-P-CCNC: 16 U/L (ref 13–39)
AST SERPL W P-5'-P-CCNC: 17 U/L (ref 13–39)
AST SERPL W P-5'-P-CCNC: 18 U/L (ref 13–39)
AST SERPL W P-5'-P-CCNC: 22 U/L (ref 13–39)
AST SERPL W P-5'-P-CCNC: 23 U/L (ref 13–39)
AST SERPL W P-5'-P-CCNC: 25 U/L (ref 13–39)
AST SERPL W P-5'-P-CCNC: 9 U/L (ref 13–39)
B BURGDOR IGG+IGM SER QL: 0.02 (ref 0–0.89)
BACTERIA #/AREA URNS HPF: ABNORMAL /HPF
BASOPHILS # BLD AUTO: 0 10E9/L (ref 0–0.2)
BASOPHILS # BLD AUTO: 0.1 10E9/L (ref 0–0.2)
BASOPHILS # BLD AUTO: 0.1 10E9/L (ref 0–0.2)
BASOPHILS NFR BLD AUTO: 0 %
BASOPHILS NFR BLD AUTO: 0.2 %
BASOPHILS NFR BLD AUTO: 0.3 %
BASOPHILS NFR BLD AUTO: 0.4 %
BASOPHILS NFR BLD AUTO: 0.5 %
BASOPHILS NFR BLD AUTO: 0.6 %
BASOPHILS NFR BLD AUTO: 1 %
BASOPHILS NFR BLD AUTO: 1 %
BASOPHILS NFR BLD AUTO: 2 %
BILIRUB SERPL-MCNC: 0.3 MG/DL (ref 0.3–1)
BILIRUB SERPL-MCNC: 0.4 MG/DL (ref 0.3–1)
BILIRUB SERPL-MCNC: 0.5 MG/DL (ref 0.3–1)
BILIRUB SERPL-MCNC: 0.6 MG/DL (ref 0.3–1)
BILIRUB SERPL-MCNC: 0.7 MG/DL (ref 0.3–1)
BILIRUB SERPL-MCNC: 1 MG/DL (ref 0.3–1)
BILIRUB UR QL STRIP: NEGATIVE
BUN SERPL-MCNC: 11 MG/DL (ref 7–25)
BUN SERPL-MCNC: 12 MG/DL (ref 7–25)
BUN SERPL-MCNC: 13 MG/DL (ref 7–25)
BUN SERPL-MCNC: 13 MG/DL (ref 7–25)
BUN SERPL-MCNC: 14 MG/DL (ref 7–25)
BUN SERPL-MCNC: 15 MG/DL (ref 7–25)
BUN SERPL-MCNC: 16 MG/DL (ref 7–25)
BUN SERPL-MCNC: 16 MG/DL (ref 7–25)
BUN SERPL-MCNC: 28 MG/DL (ref 7–25)
BUN SERPL-MCNC: 34 MG/DL (ref 7–25)
BUN SERPL-MCNC: 7 MG/DL (ref 7–25)
CALCIUM SERPL-MCNC: 8.7 MG/DL (ref 8.6–10.3)
CALCIUM SERPL-MCNC: 8.8 MG/DL (ref 8.6–10.3)
CALCIUM SERPL-MCNC: 8.9 MG/DL (ref 8.6–10.3)
CALCIUM SERPL-MCNC: 8.9 MG/DL (ref 8.6–10.3)
CALCIUM SERPL-MCNC: 9 MG/DL (ref 8.6–10.3)
CALCIUM SERPL-MCNC: 9.1 MG/DL (ref 8.6–10.3)
CALCIUM SERPL-MCNC: 9.2 MG/DL (ref 8.6–10.3)
CALCIUM SERPL-MCNC: 9.2 MG/DL (ref 8.6–10.3)
CALCIUM SERPL-MCNC: 9.3 MG/DL (ref 8.6–10.3)
CALCIUM SERPL-MCNC: 9.6 MG/DL (ref 8.6–10.3)
CALCIUM SERPL-MCNC: 9.9 MG/DL (ref 8.6–10.3)
CEA SERPL-MCNC: 3.6 NG/ML
CHLORIDE SERPL-SCNC: 100 MMOL/L (ref 98–107)
CHLORIDE SERPL-SCNC: 100 MMOL/L (ref 98–107)
CHLORIDE SERPL-SCNC: 101 MMOL/L (ref 98–107)
CHLORIDE SERPL-SCNC: 102 MMOL/L (ref 98–107)
CHLORIDE SERPL-SCNC: 102 MMOL/L (ref 98–107)
CHLORIDE SERPL-SCNC: 103 MMOL/L (ref 98–107)
CHLORIDE SERPL-SCNC: 104 MMOL/L (ref 98–107)
CHLORIDE SERPL-SCNC: 105 MMOL/L (ref 98–107)
CHLORIDE SERPL-SCNC: 105 MMOL/L (ref 98–107)
CHLORIDE SERPL-SCNC: 106 MMOL/L (ref 98–107)
CHLORIDE SERPL-SCNC: 106 MMOL/L (ref 98–107)
CHLORIDE SERPL-SCNC: 88 MMOL/L (ref 98–107)
CHLORIDE SERPL-SCNC: 94 MMOL/L (ref 98–107)
CHLORIDE SERPL-SCNC: 94 MMOL/L (ref 98–107)
CHLORIDE SERPL-SCNC: 97 MMOL/L (ref 98–107)
CHLORIDE SERPL-SCNC: 97 MMOL/L (ref 98–107)
CHLORIDE SERPL-SCNC: 98 MMOL/L (ref 98–107)
CO2 SERPL-SCNC: 17 MMOL/L (ref 21–31)
CO2 SERPL-SCNC: 18 MMOL/L (ref 21–31)
CO2 SERPL-SCNC: 22 MMOL/L (ref 21–31)
CO2 SERPL-SCNC: 23 MMOL/L (ref 21–31)
CO2 SERPL-SCNC: 24 MMOL/L (ref 21–31)
CO2 SERPL-SCNC: 24 MMOL/L (ref 21–31)
CO2 SERPL-SCNC: 25 MMOL/L (ref 21–31)
CO2 SERPL-SCNC: 26 MMOL/L (ref 21–31)
CO2 SERPL-SCNC: 28 MMOL/L (ref 21–31)
CO2 SERPL-SCNC: 30 MMOL/L (ref 21–31)
CO2 SERPL-SCNC: 31 MMOL/L (ref 21–31)
COLOR UR AUTO: YELLOW
CREAT SERPL-MCNC: 0.64 MG/DL (ref 0.7–1.3)
CREAT SERPL-MCNC: 0.65 MG/DL (ref 0.7–1.3)
CREAT SERPL-MCNC: 0.65 MG/DL (ref 0.7–1.3)
CREAT SERPL-MCNC: 0.66 MG/DL (ref 0.7–1.3)
CREAT SERPL-MCNC: 0.68 MG/DL (ref 0.7–1.3)
CREAT SERPL-MCNC: 0.7 MG/DL (ref 0.7–1.3)
CREAT SERPL-MCNC: 0.71 MG/DL (ref 0.7–1.3)
CREAT SERPL-MCNC: 0.71 MG/DL (ref 0.7–1.3)
CREAT SERPL-MCNC: 0.73 MG/DL (ref 0.7–1.3)
CREAT SERPL-MCNC: 0.73 MG/DL (ref 0.7–1.3)
CREAT SERPL-MCNC: 0.76 MG/DL (ref 0.7–1.3)
CREAT SERPL-MCNC: 0.77 MG/DL (ref 0.7–1.3)
CREAT SERPL-MCNC: 0.78 MG/DL (ref 0.7–1.3)
CREAT SERPL-MCNC: 0.78 MG/DL (ref 0.7–1.3)
CREAT SERPL-MCNC: 0.8 MG/DL (ref 0.7–1.3)
CREAT SERPL-MCNC: 0.81 MG/DL (ref 0.7–1.3)
CREAT SERPL-MCNC: 0.92 MG/DL (ref 0.7–1.3)
CREAT SERPL-MCNC: 0.92 MG/DL (ref 0.7–1.3)
CREAT SERPL-MCNC: 1.06 MG/DL (ref 0.7–1.3)
CRP SERPL-MCNC: 0.3 MG/L
CRP SERPL-MCNC: 0.3 MG/L
DIFFERENTIAL METHOD BLD: ABNORMAL
EOSINOPHIL # BLD AUTO: 0 10E9/L (ref 0–0.7)
EOSINOPHIL # BLD AUTO: 0.1 10E9/L (ref 0–0.7)
EOSINOPHIL # BLD AUTO: 0.2 10E9/L (ref 0–0.7)
EOSINOPHIL NFR BLD AUTO: 0.1 %
EOSINOPHIL NFR BLD AUTO: 0.4 %
EOSINOPHIL NFR BLD AUTO: 0.5 %
EOSINOPHIL NFR BLD AUTO: 0.6 %
EOSINOPHIL NFR BLD AUTO: 0.8 %
EOSINOPHIL NFR BLD AUTO: 0.9 %
EOSINOPHIL NFR BLD AUTO: 1 %
EOSINOPHIL NFR BLD AUTO: 1 %
EOSINOPHIL NFR BLD AUTO: 1.2 %
EOSINOPHIL NFR BLD AUTO: 1.6 %
EOSINOPHIL NFR BLD AUTO: 1.8 %
EOSINOPHIL NFR BLD AUTO: 1.9 %
EOSINOPHIL NFR BLD AUTO: 1.9 %
EOSINOPHIL NFR BLD AUTO: 2 %
EOSINOPHIL NFR BLD AUTO: 2.3 %
EOSINOPHIL NFR BLD AUTO: 2.4 %
EOSINOPHIL NFR BLD AUTO: 3 %
EOSINOPHIL NFR BLD AUTO: 3 %
EOSINOPHIL NFR BLD AUTO: 3.8 %
EOSINOPHIL NFR BLD AUTO: 4 %
EOSINOPHIL NFR BLD AUTO: 4.6 %
EOSINOPHIL NFR BLD AUTO: 4.8 %
EOSINOPHIL NFR BLD AUTO: 5.7 %
EOSINOPHIL NFR BLD AUTO: 7.9 %
ERYTHROCYTE [DISTWIDTH] IN BLOOD BY AUTOMATED COUNT: 13.3 % (ref 10–15)
ERYTHROCYTE [DISTWIDTH] IN BLOOD BY AUTOMATED COUNT: 14 % (ref 10–15)
ERYTHROCYTE [DISTWIDTH] IN BLOOD BY AUTOMATED COUNT: 14.2 % (ref 10–15)
ERYTHROCYTE [DISTWIDTH] IN BLOOD BY AUTOMATED COUNT: 14.5 % (ref 10–15)
ERYTHROCYTE [DISTWIDTH] IN BLOOD BY AUTOMATED COUNT: 14.7 % (ref 10–15)
ERYTHROCYTE [DISTWIDTH] IN BLOOD BY AUTOMATED COUNT: 14.8 % (ref 10–15)
ERYTHROCYTE [DISTWIDTH] IN BLOOD BY AUTOMATED COUNT: 14.9 % (ref 10–15)
ERYTHROCYTE [DISTWIDTH] IN BLOOD BY AUTOMATED COUNT: 14.9 % (ref 10–15)
ERYTHROCYTE [DISTWIDTH] IN BLOOD BY AUTOMATED COUNT: 15 % (ref 10–15)
ERYTHROCYTE [DISTWIDTH] IN BLOOD BY AUTOMATED COUNT: 15.2 % (ref 10–15)
ERYTHROCYTE [DISTWIDTH] IN BLOOD BY AUTOMATED COUNT: 15.5 % (ref 10–15)
ERYTHROCYTE [DISTWIDTH] IN BLOOD BY AUTOMATED COUNT: 16 % (ref 10–15)
ERYTHROCYTE [DISTWIDTH] IN BLOOD BY AUTOMATED COUNT: 16.2 % (ref 10–15)
ERYTHROCYTE [DISTWIDTH] IN BLOOD BY AUTOMATED COUNT: 16.2 % (ref 10–15)
ERYTHROCYTE [DISTWIDTH] IN BLOOD BY AUTOMATED COUNT: 16.3 % (ref 10–15)
ERYTHROCYTE [DISTWIDTH] IN BLOOD BY AUTOMATED COUNT: 16.5 % (ref 10–15)
ERYTHROCYTE [DISTWIDTH] IN BLOOD BY AUTOMATED COUNT: 16.7 % (ref 10–15)
ERYTHROCYTE [DISTWIDTH] IN BLOOD BY AUTOMATED COUNT: 17 % (ref 10–15)
ERYTHROCYTE [DISTWIDTH] IN BLOOD BY AUTOMATED COUNT: 17.4 % (ref 10–15)
ERYTHROCYTE [DISTWIDTH] IN BLOOD BY AUTOMATED COUNT: 18.6 % (ref 10–15)
ERYTHROCYTE [DISTWIDTH] IN BLOOD BY AUTOMATED COUNT: 18.6 % (ref 10–15)
ERYTHROCYTE [DISTWIDTH] IN BLOOD BY AUTOMATED COUNT: 18.8 % (ref 10–15)
ERYTHROCYTE [DISTWIDTH] IN BLOOD BY AUTOMATED COUNT: 19 % (ref 10–15)
ERYTHROCYTE [DISTWIDTH] IN BLOOD BY AUTOMATED COUNT: 20.4 % (ref 10–15)
ERYTHROCYTE [DISTWIDTH] IN BLOOD BY AUTOMATED COUNT: 20.4 % (ref 10–15)
ERYTHROCYTE [DISTWIDTH] IN BLOOD BY AUTOMATED COUNT: 21.2 % (ref 10–15)
ERYTHROCYTE [DISTWIDTH] IN BLOOD BY AUTOMATED COUNT: 21.7 % (ref 10–15)
GFR SERPL CREATININE-BSD FRML MDRD: 70 ML/MIN/{1.73_M2}
GFR SERPL CREATININE-BSD FRML MDRD: 82 ML/MIN/{1.73_M2}
GFR SERPL CREATININE-BSD FRML MDRD: 82 ML/MIN/{1.73_M2}
GFR SERPL CREATININE-BSD FRML MDRD: >90 ML/MIN/{1.73_M2}
GLUCOSE SERPL-MCNC: 104 MG/DL (ref 70–105)
GLUCOSE SERPL-MCNC: 105 MG/DL (ref 70–105)
GLUCOSE SERPL-MCNC: 106 MG/DL (ref 70–105)
GLUCOSE SERPL-MCNC: 112 MG/DL (ref 70–105)
GLUCOSE SERPL-MCNC: 115 MG/DL (ref 70–105)
GLUCOSE SERPL-MCNC: 116 MG/DL (ref 70–105)
GLUCOSE SERPL-MCNC: 120 MG/DL (ref 70–105)
GLUCOSE SERPL-MCNC: 125 MG/DL (ref 70–105)
GLUCOSE SERPL-MCNC: 125 MG/DL (ref 70–105)
GLUCOSE SERPL-MCNC: 126 MG/DL (ref 70–105)
GLUCOSE SERPL-MCNC: 131 MG/DL (ref 70–105)
GLUCOSE SERPL-MCNC: 138 MG/DL (ref 70–105)
GLUCOSE SERPL-MCNC: 90 MG/DL (ref 70–105)
GLUCOSE SERPL-MCNC: 91 MG/DL (ref 70–105)
GLUCOSE SERPL-MCNC: 92 MG/DL (ref 70–105)
GLUCOSE SERPL-MCNC: 93 MG/DL (ref 70–105)
GLUCOSE SERPL-MCNC: 97 MG/DL (ref 70–105)
GLUCOSE UR STRIP-MCNC: NEGATIVE MG/DL
HCT VFR BLD AUTO: 23.7 % (ref 40–53)
HCT VFR BLD AUTO: 24.2 % (ref 40–53)
HCT VFR BLD AUTO: 24.8 % (ref 40–53)
HCT VFR BLD AUTO: 25 % (ref 40–53)
HCT VFR BLD AUTO: 25.6 % (ref 40–53)
HCT VFR BLD AUTO: 25.8 % (ref 40–53)
HCT VFR BLD AUTO: 26.1 % (ref 40–53)
HCT VFR BLD AUTO: 26.5 % (ref 40–53)
HCT VFR BLD AUTO: 26.9 % (ref 40–53)
HCT VFR BLD AUTO: 27 % (ref 40–53)
HCT VFR BLD AUTO: 27.3 % (ref 40–53)
HCT VFR BLD AUTO: 27.3 % (ref 40–53)
HCT VFR BLD AUTO: 27.7 % (ref 40–53)
HCT VFR BLD AUTO: 27.9 % (ref 40–53)
HCT VFR BLD AUTO: 28.2 % (ref 40–53)
HCT VFR BLD AUTO: 28.6 % (ref 40–53)
HCT VFR BLD AUTO: 28.9 % (ref 40–53)
HCT VFR BLD AUTO: 29.1 % (ref 40–53)
HCT VFR BLD AUTO: 31.1 % (ref 40–53)
HCT VFR BLD AUTO: 32.4 % (ref 40–53)
HCT VFR BLD AUTO: 32.8 % (ref 40–53)
HCT VFR BLD AUTO: 32.9 % (ref 40–53)
HCT VFR BLD AUTO: 33.5 % (ref 40–53)
HCT VFR BLD AUTO: 34 % (ref 40–53)
HCT VFR BLD AUTO: 34.7 % (ref 40–53)
HCT VFR BLD AUTO: 34.7 % (ref 40–53)
HCT VFR BLD AUTO: 35.4 % (ref 40–53)
HCT VFR BLD AUTO: 36.2 % (ref 40–53)
HCT VFR BLD AUTO: 38.8 % (ref 40–53)
HGB BLD-MCNC: 10.1 G/DL (ref 13.3–17.7)
HGB BLD-MCNC: 10.1 G/DL (ref 13.3–17.7)
HGB BLD-MCNC: 10.9 G/DL (ref 13.3–17.7)
HGB BLD-MCNC: 11 G/DL (ref 13.3–17.7)
HGB BLD-MCNC: 11.1 G/DL (ref 13.3–17.7)
HGB BLD-MCNC: 11.2 G/DL (ref 13.3–17.7)
HGB BLD-MCNC: 11.3 G/DL (ref 13.3–17.7)
HGB BLD-MCNC: 11.6 G/DL (ref 13.3–17.7)
HGB BLD-MCNC: 11.7 G/DL (ref 13.3–17.7)
HGB BLD-MCNC: 11.8 G/DL (ref 13.3–17.7)
HGB BLD-MCNC: 12.7 G/DL (ref 13.3–17.7)
HGB BLD-MCNC: 12.9 G/DL (ref 13.3–17.7)
HGB BLD-MCNC: 13 G/DL (ref 13.3–17.7)
HGB BLD-MCNC: 8.1 G/DL (ref 13.3–17.7)
HGB BLD-MCNC: 8.2 G/DL (ref 13.3–17.7)
HGB BLD-MCNC: 8.3 G/DL (ref 13.3–17.7)
HGB BLD-MCNC: 8.5 G/DL (ref 13.3–17.7)
HGB BLD-MCNC: 8.5 G/DL (ref 13.3–17.7)
HGB BLD-MCNC: 8.6 G/DL (ref 13.3–17.7)
HGB BLD-MCNC: 8.8 G/DL (ref 13.3–17.7)
HGB BLD-MCNC: 8.9 G/DL (ref 13.3–17.7)
HGB BLD-MCNC: 8.9 G/DL (ref 13.3–17.7)
HGB BLD-MCNC: 9.1 G/DL (ref 13.3–17.7)
HGB BLD-MCNC: 9.2 G/DL (ref 13.3–17.7)
HGB BLD-MCNC: 9.4 G/DL (ref 13.3–17.7)
HGB BLD-MCNC: 9.5 G/DL (ref 13.3–17.7)
HGB BLD-MCNC: 9.5 G/DL (ref 13.3–17.7)
HGB UR QL STRIP: ABNORMAL
HYALINE CASTS #/AREA URNS LPF: 12 /LPF (ref 0–2)
IMM GRANULOCYTES # BLD: 0 10E9/L (ref 0–0.4)
IMM GRANULOCYTES # BLD: 0.1 10E9/L (ref 0–0.4)
IMM GRANULOCYTES # BLD: 0.2 10E9/L (ref 0–0.4)
IMM GRANULOCYTES # BLD: 1.2 10E9/L (ref 0–0.4)
IMM GRANULOCYTES NFR BLD: 0 %
IMM GRANULOCYTES NFR BLD: 0.5 %
IMM GRANULOCYTES NFR BLD: 0.7 %
IMM GRANULOCYTES NFR BLD: 0.7 %
IMM GRANULOCYTES NFR BLD: 0.9 %
IMM GRANULOCYTES NFR BLD: 0.9 %
IMM GRANULOCYTES NFR BLD: 1 %
IMM GRANULOCYTES NFR BLD: 1.1 %
IMM GRANULOCYTES NFR BLD: 1.1 %
IMM GRANULOCYTES NFR BLD: 1.2 %
IMM GRANULOCYTES NFR BLD: 1.5 %
IMM GRANULOCYTES NFR BLD: 1.7 %
IMM GRANULOCYTES NFR BLD: 2 %
IMM GRANULOCYTES NFR BLD: 2.5 %
IMM GRANULOCYTES NFR BLD: 2.7 %
IMM GRANULOCYTES NFR BLD: 3 %
IMM GRANULOCYTES NFR BLD: 3 %
IMM GRANULOCYTES NFR BLD: 3.1 %
IMM GRANULOCYTES NFR BLD: 3.3 %
IMM GRANULOCYTES NFR BLD: 3.6 %
IMM GRANULOCYTES NFR BLD: 4 %
IMM GRANULOCYTES NFR BLD: 6 %
IMM GRANULOCYTES NFR BLD: 6.7 %
KETONES UR STRIP-MCNC: >150 MG/DL
LACTATE BLD-SCNC: 1 MMOL/L (ref 0.7–2)
LDH SERPL L TO P-CCNC: 117 U/L (ref 140–271)
LDH SERPL L TO P-CCNC: 134 U/L (ref 140–271)
LDH SERPL L TO P-CCNC: 138 U/L (ref 140–271)
LDH SERPL L TO P-CCNC: 173 U/L (ref 140–271)
LDH SERPL L TO P-CCNC: 225 U/L (ref 140–271)
LDH SERPL L TO P-CCNC: 96 U/L (ref 140–271)
LEUKOCYTE ESTERASE UR QL STRIP: NEGATIVE
LIPASE SERPL-CCNC: 21 U/L (ref 11–82)
LYMPHOCYTES # BLD AUTO: 0.2 10E9/L (ref 0.8–5.3)
LYMPHOCYTES # BLD AUTO: 0.3 10E9/L (ref 0.8–5.3)
LYMPHOCYTES # BLD AUTO: 0.5 10E9/L (ref 0.8–5.3)
LYMPHOCYTES # BLD AUTO: 0.5 10E9/L (ref 0.8–5.3)
LYMPHOCYTES # BLD AUTO: 0.6 10E9/L (ref 0.8–5.3)
LYMPHOCYTES # BLD AUTO: 0.7 10E9/L (ref 0.8–5.3)
LYMPHOCYTES # BLD AUTO: 0.8 10E9/L (ref 0.8–5.3)
LYMPHOCYTES # BLD AUTO: 0.9 10E9/L (ref 0.8–5.3)
LYMPHOCYTES # BLD AUTO: 1 10E9/L (ref 0.8–5.3)
LYMPHOCYTES # BLD AUTO: 1.1 10E9/L (ref 0.8–5.3)
LYMPHOCYTES NFR BLD AUTO: 10 %
LYMPHOCYTES NFR BLD AUTO: 10.4 %
LYMPHOCYTES NFR BLD AUTO: 11.1 %
LYMPHOCYTES NFR BLD AUTO: 11.3 %
LYMPHOCYTES NFR BLD AUTO: 11.9 %
LYMPHOCYTES NFR BLD AUTO: 11.9 %
LYMPHOCYTES NFR BLD AUTO: 12.6 %
LYMPHOCYTES NFR BLD AUTO: 12.9 %
LYMPHOCYTES NFR BLD AUTO: 13.3 %
LYMPHOCYTES NFR BLD AUTO: 15 %
LYMPHOCYTES NFR BLD AUTO: 15.2 %
LYMPHOCYTES NFR BLD AUTO: 15.4 %
LYMPHOCYTES NFR BLD AUTO: 17 %
LYMPHOCYTES NFR BLD AUTO: 17.7 %
LYMPHOCYTES NFR BLD AUTO: 17.7 %
LYMPHOCYTES NFR BLD AUTO: 17.8 %
LYMPHOCYTES NFR BLD AUTO: 20 %
LYMPHOCYTES NFR BLD AUTO: 20.9 %
LYMPHOCYTES NFR BLD AUTO: 22.7 %
LYMPHOCYTES NFR BLD AUTO: 24.6 %
LYMPHOCYTES NFR BLD AUTO: 25.1 %
LYMPHOCYTES NFR BLD AUTO: 28 %
LYMPHOCYTES NFR BLD AUTO: 30.3 %
LYMPHOCYTES NFR BLD AUTO: 34.7 %
LYMPHOCYTES NFR BLD AUTO: 35.6 %
LYMPHOCYTES NFR BLD AUTO: 36.3 %
LYMPHOCYTES NFR BLD AUTO: 5 %
LYMPHOCYTES NFR BLD AUTO: 8 %
LYMPHOCYTES NFR BLD AUTO: 8.7 %
MAGNESIUM SERPL-MCNC: 1.7 MG/DL (ref 1.9–2.7)
MAGNESIUM SERPL-MCNC: 1.9 MG/DL (ref 1.9–2.7)
MCH RBC QN AUTO: 29.8 PG (ref 26.5–33)
MCH RBC QN AUTO: 30.1 PG (ref 26.5–33)
MCH RBC QN AUTO: 30.4 PG (ref 26.5–33)
MCH RBC QN AUTO: 30.5 PG (ref 26.5–33)
MCH RBC QN AUTO: 30.6 PG (ref 26.5–33)
MCH RBC QN AUTO: 30.7 PG (ref 26.5–33)
MCH RBC QN AUTO: 30.8 PG (ref 26.5–33)
MCH RBC QN AUTO: 30.9 PG (ref 26.5–33)
MCH RBC QN AUTO: 30.9 PG (ref 26.5–33)
MCH RBC QN AUTO: 31 PG (ref 26.5–33)
MCH RBC QN AUTO: 31.1 PG (ref 26.5–33)
MCH RBC QN AUTO: 31.2 PG (ref 26.5–33)
MCH RBC QN AUTO: 31.2 PG (ref 26.5–33)
MCH RBC QN AUTO: 31.3 PG (ref 26.5–33)
MCH RBC QN AUTO: 31.4 PG (ref 26.5–33)
MCH RBC QN AUTO: 31.4 PG (ref 26.5–33)
MCH RBC QN AUTO: 31.7 PG (ref 26.5–33)
MCH RBC QN AUTO: 31.8 PG (ref 26.5–33)
MCH RBC QN AUTO: 31.8 PG (ref 26.5–33)
MCH RBC QN AUTO: 31.9 PG (ref 26.5–33)
MCH RBC QN AUTO: 32.2 PG (ref 26.5–33)
MCH RBC QN AUTO: 32.2 PG (ref 26.5–33)
MCH RBC QN AUTO: 32.7 PG (ref 26.5–33)
MCH RBC QN AUTO: 34.1 PG (ref 26.5–33)
MCH RBC QN AUTO: 34.2 PG (ref 26.5–33)
MCHC RBC AUTO-ENTMCNC: 32.6 G/DL (ref 31.5–36.5)
MCHC RBC AUTO-ENTMCNC: 32.6 G/DL (ref 31.5–36.5)
MCHC RBC AUTO-ENTMCNC: 33.2 G/DL (ref 31.5–36.5)
MCHC RBC AUTO-ENTMCNC: 33.2 G/DL (ref 31.5–36.5)
MCHC RBC AUTO-ENTMCNC: 33.3 G/DL (ref 31.5–36.5)
MCHC RBC AUTO-ENTMCNC: 33.4 G/DL (ref 31.5–36.5)
MCHC RBC AUTO-ENTMCNC: 33.4 G/DL (ref 31.5–36.5)
MCHC RBC AUTO-ENTMCNC: 33.5 G/DL (ref 31.5–36.5)
MCHC RBC AUTO-ENTMCNC: 33.6 G/DL (ref 31.5–36.5)
MCHC RBC AUTO-ENTMCNC: 33.6 G/DL (ref 31.5–36.5)
MCHC RBC AUTO-ENTMCNC: 33.7 G/DL (ref 31.5–36.5)
MCHC RBC AUTO-ENTMCNC: 33.9 G/DL (ref 31.5–36.5)
MCHC RBC AUTO-ENTMCNC: 34 G/DL (ref 31.5–36.5)
MCHC RBC AUTO-ENTMCNC: 34 G/DL (ref 31.5–36.5)
MCHC RBC AUTO-ENTMCNC: 34.1 G/DL (ref 31.5–36.5)
MCHC RBC AUTO-ENTMCNC: 34.4 G/DL (ref 31.5–36.5)
MCHC RBC AUTO-ENTMCNC: 34.4 G/DL (ref 31.5–36.5)
MCHC RBC AUTO-ENTMCNC: 34.5 G/DL (ref 31.5–36.5)
MCHC RBC AUTO-ENTMCNC: 34.6 G/DL (ref 31.5–36.5)
MCHC RBC AUTO-ENTMCNC: 34.7 G/DL (ref 31.5–36.5)
MCHC RBC AUTO-ENTMCNC: 34.9 G/DL (ref 31.5–36.5)
MCHC RBC AUTO-ENTMCNC: 34.9 G/DL (ref 31.5–36.5)
MCHC RBC AUTO-ENTMCNC: 35.4 G/DL (ref 31.5–36.5)
MCHC RBC AUTO-ENTMCNC: 35.6 G/DL (ref 31.5–36.5)
MCHC RBC AUTO-ENTMCNC: 35.9 G/DL (ref 31.5–36.5)
MCV RBC AUTO: 102 FL (ref 78–100)
MCV RBC AUTO: 86 FL (ref 78–100)
MCV RBC AUTO: 87 FL (ref 78–100)
MCV RBC AUTO: 87 FL (ref 78–100)
MCV RBC AUTO: 88 FL (ref 78–100)
MCV RBC AUTO: 90 FL (ref 78–100)
MCV RBC AUTO: 91 FL (ref 78–100)
MCV RBC AUTO: 91 FL (ref 78–100)
MCV RBC AUTO: 92 FL (ref 78–100)
MCV RBC AUTO: 93 FL (ref 78–100)
MCV RBC AUTO: 93 FL (ref 78–100)
MCV RBC AUTO: 94 FL (ref 78–100)
MCV RBC AUTO: 95 FL (ref 78–100)
MCV RBC AUTO: 97 FL (ref 78–100)
MCV RBC AUTO: 97 FL (ref 78–100)
MCV RBC AUTO: 98 FL (ref 78–100)
MCV RBC AUTO: 98 FL (ref 78–100)
MCV RBC AUTO: 99 FL (ref 78–100)
METAMYELOCYTES # BLD: 0.1 10E9/L
METAMYELOCYTES # BLD: 0.1 10E9/L
METAMYELOCYTES NFR BLD MANUAL: 2 %
METAMYELOCYTES NFR BLD MANUAL: 2 %
MONOCYTES # BLD AUTO: 0 10E9/L (ref 0–1.3)
MONOCYTES # BLD AUTO: 0.1 10E9/L (ref 0–1.3)
MONOCYTES # BLD AUTO: 0.1 10E9/L (ref 0–1.3)
MONOCYTES # BLD AUTO: 0.2 10E9/L (ref 0–1.3)
MONOCYTES # BLD AUTO: 0.3 10E9/L (ref 0–1.3)
MONOCYTES # BLD AUTO: 0.4 10E9/L (ref 0–1.3)
MONOCYTES # BLD AUTO: 0.4 10E9/L (ref 0–1.3)
MONOCYTES # BLD AUTO: 0.5 10E9/L (ref 0–1.3)
MONOCYTES # BLD AUTO: 0.5 10E9/L (ref 0–1.3)
MONOCYTES # BLD AUTO: 0.6 10E9/L (ref 0–1.3)
MONOCYTES # BLD AUTO: 0.7 10E9/L (ref 0–1.3)
MONOCYTES # BLD AUTO: 0.8 10E9/L (ref 0–1.3)
MONOCYTES # BLD AUTO: 0.8 10E9/L (ref 0–1.3)
MONOCYTES # BLD AUTO: 0.9 10E9/L (ref 0–1.3)
MONOCYTES # BLD AUTO: 0.9 10E9/L (ref 0–1.3)
MONOCYTES # BLD AUTO: 1 10E9/L (ref 0–1.3)
MONOCYTES # BLD AUTO: 1 10E9/L (ref 0–1.3)
MONOCYTES # BLD AUTO: 1.1 10E9/L (ref 0–1.3)
MONOCYTES # BLD AUTO: 1.2 10E9/L (ref 0–1.3)
MONOCYTES # BLD AUTO: 1.3 10E9/L (ref 0–1.3)
MONOCYTES # BLD AUTO: 1.7 10E9/L (ref 0–1.3)
MONOCYTES NFR BLD AUTO: 10.8 %
MONOCYTES NFR BLD AUTO: 11.7 %
MONOCYTES NFR BLD AUTO: 11.8 %
MONOCYTES NFR BLD AUTO: 13.2 %
MONOCYTES NFR BLD AUTO: 13.6 %
MONOCYTES NFR BLD AUTO: 13.7 %
MONOCYTES NFR BLD AUTO: 13.7 %
MONOCYTES NFR BLD AUTO: 14 %
MONOCYTES NFR BLD AUTO: 14.4 %
MONOCYTES NFR BLD AUTO: 14.8 %
MONOCYTES NFR BLD AUTO: 15.4 %
MONOCYTES NFR BLD AUTO: 16.3 %
MONOCYTES NFR BLD AUTO: 18 %
MONOCYTES NFR BLD AUTO: 19.1 %
MONOCYTES NFR BLD AUTO: 19.2 %
MONOCYTES NFR BLD AUTO: 19.9 %
MONOCYTES NFR BLD AUTO: 2.5 %
MONOCYTES NFR BLD AUTO: 20.3 %
MONOCYTES NFR BLD AUTO: 22.1 %
MONOCYTES NFR BLD AUTO: 22.8 %
MONOCYTES NFR BLD AUTO: 28 %
MONOCYTES NFR BLD AUTO: 29.6 %
MONOCYTES NFR BLD AUTO: 3.2 %
MONOCYTES NFR BLD AUTO: 6.6 %
MONOCYTES NFR BLD AUTO: 9.3 %
MONOCYTES NFR BLD AUTO: 9.4 %
MONOCYTES NFR BLD AUTO: 9.6 %
MUCOUS THREADS #/AREA URNS LPF: PRESENT /LPF
MYELOCYTES # BLD: 0.1 10E9/L
MYELOCYTES # BLD: 0.2 10E9/L
MYELOCYTES NFR BLD MANUAL: 2 %
MYELOCYTES NFR BLD MANUAL: 4 %
NEUTROPHILS # BLD AUTO: 0.3 10E9/L (ref 1.6–8.3)
NEUTROPHILS # BLD AUTO: 0.9 10E9/L (ref 1.6–8.3)
NEUTROPHILS # BLD AUTO: 0.9 10E9/L (ref 1.6–8.3)
NEUTROPHILS # BLD AUTO: 1 10E9/L (ref 1.6–8.3)
NEUTROPHILS # BLD AUTO: 1.3 10E9/L (ref 1.6–8.3)
NEUTROPHILS # BLD AUTO: 1.3 10E9/L (ref 1.6–8.3)
NEUTROPHILS # BLD AUTO: 1.6 10E9/L (ref 1.6–8.3)
NEUTROPHILS # BLD AUTO: 1.9 10E9/L (ref 1.6–8.3)
NEUTROPHILS # BLD AUTO: 14.1 10E9/L (ref 1.6–8.3)
NEUTROPHILS # BLD AUTO: 2 10E9/L (ref 1.6–8.3)
NEUTROPHILS # BLD AUTO: 2.2 10E9/L (ref 1.6–8.3)
NEUTROPHILS # BLD AUTO: 2.7 10E9/L (ref 1.6–8.3)
NEUTROPHILS # BLD AUTO: 2.8 10E9/L (ref 1.6–8.3)
NEUTROPHILS # BLD AUTO: 3 10E9/L (ref 1.6–8.3)
NEUTROPHILS # BLD AUTO: 3 10E9/L (ref 1.6–8.3)
NEUTROPHILS # BLD AUTO: 3.3 10E9/L (ref 1.6–8.3)
NEUTROPHILS # BLD AUTO: 3.5 10E9/L (ref 1.6–8.3)
NEUTROPHILS # BLD AUTO: 3.6 10E9/L (ref 1.6–8.3)
NEUTROPHILS # BLD AUTO: 4 10E9/L (ref 1.6–8.3)
NEUTROPHILS # BLD AUTO: 4.1 10E9/L (ref 1.6–8.3)
NEUTROPHILS # BLD AUTO: 4.1 10E9/L (ref 1.6–8.3)
NEUTROPHILS # BLD AUTO: 4.5 10E9/L (ref 1.6–8.3)
NEUTROPHILS # BLD AUTO: 5.1 10E9/L (ref 1.6–8.3)
NEUTROPHILS # BLD AUTO: 5.2 10E9/L (ref 1.6–8.3)
NEUTROPHILS # BLD AUTO: 5.6 10E9/L (ref 1.6–8.3)
NEUTROPHILS # BLD AUTO: 5.6 10E9/L (ref 1.6–8.3)
NEUTROPHILS # BLD AUTO: 6.1 10E9/L (ref 1.6–8.3)
NEUTROPHILS NFR BLD AUTO: 33.7 %
NEUTROPHILS NFR BLD AUTO: 44.1 %
NEUTROPHILS NFR BLD AUTO: 44.5 %
NEUTROPHILS NFR BLD AUTO: 47 %
NEUTROPHILS NFR BLD AUTO: 48 %
NEUTROPHILS NFR BLD AUTO: 50.5 %
NEUTROPHILS NFR BLD AUTO: 53 %
NEUTROPHILS NFR BLD AUTO: 53.5 %
NEUTROPHILS NFR BLD AUTO: 54.6 %
NEUTROPHILS NFR BLD AUTO: 54.9 %
NEUTROPHILS NFR BLD AUTO: 58.8 %
NEUTROPHILS NFR BLD AUTO: 60.4 %
NEUTROPHILS NFR BLD AUTO: 62.4 %
NEUTROPHILS NFR BLD AUTO: 64 %
NEUTROPHILS NFR BLD AUTO: 65 %
NEUTROPHILS NFR BLD AUTO: 65.8 %
NEUTROPHILS NFR BLD AUTO: 65.8 %
NEUTROPHILS NFR BLD AUTO: 67.7 %
NEUTROPHILS NFR BLD AUTO: 68.9 %
NEUTROPHILS NFR BLD AUTO: 69.5 %
NEUTROPHILS NFR BLD AUTO: 70.1 %
NEUTROPHILS NFR BLD AUTO: 70.1 %
NEUTROPHILS NFR BLD AUTO: 70.9 %
NEUTROPHILS NFR BLD AUTO: 73.9 %
NEUTROPHILS NFR BLD AUTO: 78.2 %
NEUTROPHILS NFR BLD AUTO: 78.5 %
NEUTROPHILS NFR BLD AUTO: 81.2 %
NITRATE UR QL: NEGATIVE
PH UR STRIP: 5.5 PH (ref 5–7)
PLATELET # BLD AUTO: 105 10E9/L (ref 150–450)
PLATELET # BLD AUTO: 108 10E9/L (ref 150–450)
PLATELET # BLD AUTO: 108 10E9/L (ref 150–450)
PLATELET # BLD AUTO: 138 10E9/L (ref 150–450)
PLATELET # BLD AUTO: 141 10E9/L (ref 150–450)
PLATELET # BLD AUTO: 174 10E9/L (ref 150–450)
PLATELET # BLD AUTO: 174 10E9/L (ref 150–450)
PLATELET # BLD AUTO: 182 10E9/L (ref 150–450)
PLATELET # BLD AUTO: 211 10E9/L (ref 150–450)
PLATELET # BLD AUTO: 219 10E9/L (ref 150–450)
PLATELET # BLD AUTO: 224 10E9/L (ref 150–450)
PLATELET # BLD AUTO: 226 10E9/L (ref 150–450)
PLATELET # BLD AUTO: 240 10E9/L (ref 150–450)
PLATELET # BLD AUTO: 241 10E9/L (ref 150–450)
PLATELET # BLD AUTO: 243 10E9/L (ref 150–450)
PLATELET # BLD AUTO: 254 10E9/L (ref 150–450)
PLATELET # BLD AUTO: 262 10E9/L (ref 150–450)
PLATELET # BLD AUTO: 274 10E9/L (ref 150–450)
PLATELET # BLD AUTO: 274 10E9/L (ref 150–450)
PLATELET # BLD AUTO: 287 10E9/L (ref 150–450)
PLATELET # BLD AUTO: 287 10E9/L (ref 150–450)
PLATELET # BLD AUTO: 300 10E9/L (ref 150–450)
PLATELET # BLD AUTO: 302 10E9/L (ref 150–450)
PLATELET # BLD AUTO: 308 10E9/L (ref 150–450)
PLATELET # BLD AUTO: 315 10E9/L (ref 150–450)
PLATELET # BLD AUTO: 329 10E9/L (ref 150–450)
PLATELET # BLD AUTO: 336 10E9/L (ref 150–450)
PLATELET # BLD AUTO: 345 10E9/L (ref 150–450)
PLATELET # BLD AUTO: 406 10E9/L (ref 150–450)
PLATELET # BLD EST: ABNORMAL 10*3/UL
POLYCHROMASIA BLD QL SMEAR: SLIGHT
POTASSIUM SERPL-SCNC: 2.9 MMOL/L (ref 3.5–5.1)
POTASSIUM SERPL-SCNC: 2.9 MMOL/L (ref 3.5–5.1)
POTASSIUM SERPL-SCNC: 3.4 MMOL/L (ref 3.5–5.1)
POTASSIUM SERPL-SCNC: 3.5 MMOL/L (ref 3.5–5.1)
POTASSIUM SERPL-SCNC: 3.6 MMOL/L (ref 3.5–5.1)
POTASSIUM SERPL-SCNC: 3.6 MMOL/L (ref 3.5–5.1)
POTASSIUM SERPL-SCNC: 3.7 MMOL/L (ref 3.5–5.1)
POTASSIUM SERPL-SCNC: 3.8 MMOL/L (ref 3.5–5.1)
POTASSIUM SERPL-SCNC: 3.9 MMOL/L (ref 3.5–5.1)
POTASSIUM SERPL-SCNC: 3.9 MMOL/L (ref 3.5–5.1)
POTASSIUM SERPL-SCNC: 4 MMOL/L (ref 3.5–5.1)
POTASSIUM SERPL-SCNC: 4.1 MMOL/L (ref 3.5–5.1)
POTASSIUM SERPL-SCNC: 4.1 MMOL/L (ref 3.5–5.1)
POTASSIUM SERPL-SCNC: 4.3 MMOL/L (ref 3.5–5.1)
POTASSIUM SERPL-SCNC: 4.6 MMOL/L (ref 3.5–5.1)
POTASSIUM SERPL-SCNC: 4.9 MMOL/L (ref 3.5–5.1)
PROCALCITONIN SERPL-MCNC: 1.85 NG/ML
PROCALCITONIN SERPL-MCNC: 1.93 NG/ML
PROCALCITONIN SERPL-MCNC: 2.32 NG/ML
PROMYELOCYTES # BLD MANUAL: 0 10E9/L
PROMYELOCYTES NFR BLD MANUAL: 1 %
PROT SERPL-MCNC: 6.2 G/DL (ref 6.4–8.9)
PROT SERPL-MCNC: 6.2 G/DL (ref 6.4–8.9)
PROT SERPL-MCNC: 6.3 G/DL (ref 6.4–8.9)
PROT SERPL-MCNC: 6.3 G/DL (ref 6.4–8.9)
PROT SERPL-MCNC: 6.4 G/DL (ref 6.4–8.9)
PROT SERPL-MCNC: 6.5 G/DL (ref 6.4–8.9)
PROT SERPL-MCNC: 6.6 G/DL (ref 6.4–8.9)
PROT SERPL-MCNC: 6.7 G/DL (ref 6.4–8.9)
PROT SERPL-MCNC: 6.8 G/DL (ref 6.4–8.9)
PROT SERPL-MCNC: 6.9 G/DL (ref 6.4–8.9)
PROT SERPL-MCNC: 6.9 G/DL (ref 6.4–8.9)
PROT SERPL-MCNC: 7 G/DL (ref 6.4–8.9)
PROT SERPL-MCNC: 7 G/DL (ref 6.4–8.9)
PROT SERPL-MCNC: 7.1 G/DL (ref 6.4–8.9)
PROT SERPL-MCNC: 7.2 G/DL (ref 6.4–8.9)
PROT SERPL-MCNC: 7.3 G/DL (ref 6.4–8.9)
RAD ONC ARIA COURSE ID: NORMAL
RAD ONC ARIA COURSE LAST TREATMENT DATE: NORMAL
RAD ONC ARIA COURSE START DATE: NORMAL
RAD ONC ARIA COURSE TREATMENT ELAPSED DAYS: 0
RAD ONC ARIA COURSE TREATMENT ELAPSED DAYS: 0
RAD ONC ARIA COURSE TREATMENT ELAPSED DAYS: 1
RAD ONC ARIA COURSE TREATMENT ELAPSED DAYS: 1
RAD ONC ARIA COURSE TREATMENT ELAPSED DAYS: 10
RAD ONC ARIA COURSE TREATMENT ELAPSED DAYS: 11
RAD ONC ARIA COURSE TREATMENT ELAPSED DAYS: 11
RAD ONC ARIA COURSE TREATMENT ELAPSED DAYS: 12
RAD ONC ARIA COURSE TREATMENT ELAPSED DAYS: 13
RAD ONC ARIA COURSE TREATMENT ELAPSED DAYS: 14
RAD ONC ARIA COURSE TREATMENT ELAPSED DAYS: 15
RAD ONC ARIA COURSE TREATMENT ELAPSED DAYS: 18
RAD ONC ARIA COURSE TREATMENT ELAPSED DAYS: 19
RAD ONC ARIA COURSE TREATMENT ELAPSED DAYS: 2
RAD ONC ARIA COURSE TREATMENT ELAPSED DAYS: 20
RAD ONC ARIA COURSE TREATMENT ELAPSED DAYS: 3
RAD ONC ARIA COURSE TREATMENT ELAPSED DAYS: 4
RAD ONC ARIA COURSE TREATMENT ELAPSED DAYS: 4
RAD ONC ARIA COURSE TREATMENT ELAPSED DAYS: 5
RAD ONC ARIA COURSE TREATMENT ELAPSED DAYS: 6
RAD ONC ARIA COURSE TREATMENT ELAPSED DAYS: 7
RAD ONC ARIA COURSE TREATMENT ELAPSED DAYS: 7
RAD ONC ARIA COURSE TREATMENT ELAPSED DAYS: 8
RAD ONC ARIA COURSE TREATMENT ELAPSED DAYS: 8
RAD ONC ARIA COURSE TREATMENT ELAPSED DAYS: 9
RAD ONC ARIA FIRST TREATMENT DATE: NORMAL
RAD ONC ARIA PLAN FRACTIONS TREATED TO DATE: 1
RAD ONC ARIA PLAN FRACTIONS TREATED TO DATE: 1
RAD ONC ARIA PLAN FRACTIONS TREATED TO DATE: 10
RAD ONC ARIA PLAN FRACTIONS TREATED TO DATE: 10
RAD ONC ARIA PLAN FRACTIONS TREATED TO DATE: 11
RAD ONC ARIA PLAN FRACTIONS TREATED TO DATE: 12
RAD ONC ARIA PLAN FRACTIONS TREATED TO DATE: 13
RAD ONC ARIA PLAN FRACTIONS TREATED TO DATE: 14
RAD ONC ARIA PLAN FRACTIONS TREATED TO DATE: 15
RAD ONC ARIA PLAN FRACTIONS TREATED TO DATE: 2
RAD ONC ARIA PLAN FRACTIONS TREATED TO DATE: 2
RAD ONC ARIA PLAN FRACTIONS TREATED TO DATE: 3
RAD ONC ARIA PLAN FRACTIONS TREATED TO DATE: 3
RAD ONC ARIA PLAN FRACTIONS TREATED TO DATE: 4
RAD ONC ARIA PLAN FRACTIONS TREATED TO DATE: 4
RAD ONC ARIA PLAN FRACTIONS TREATED TO DATE: 5
RAD ONC ARIA PLAN FRACTIONS TREATED TO DATE: 5
RAD ONC ARIA PLAN FRACTIONS TREATED TO DATE: 6
RAD ONC ARIA PLAN FRACTIONS TREATED TO DATE: 6
RAD ONC ARIA PLAN FRACTIONS TREATED TO DATE: 7
RAD ONC ARIA PLAN FRACTIONS TREATED TO DATE: 7
RAD ONC ARIA PLAN FRACTIONS TREATED TO DATE: 8
RAD ONC ARIA PLAN FRACTIONS TREATED TO DATE: 8
RAD ONC ARIA PLAN FRACTIONS TREATED TO DATE: 9
RAD ONC ARIA PLAN FRACTIONS TREATED TO DATE: 9
RAD ONC ARIA PLAN ID: NORMAL
RAD ONC ARIA PLAN NAME: NORMAL
RAD ONC ARIA PLAN PRESCRIBED DOSE PER FRACTION: 2.4 GY
RAD ONC ARIA PLAN PRESCRIBED DOSE PER FRACTION: 2.5 GY
RAD ONC ARIA PLAN TOTAL FRACTIONS PRESCRIBED: 10
RAD ONC ARIA PLAN TOTAL FRACTIONS PRESCRIBED: 15
RAD ONC ARIA PLAN TOTAL PRESCRIBED DOSE: 2500 CGY
RAD ONC ARIA PLAN TOTAL PRESCRIBED DOSE: 3600 CGY
RAD ONC ARIA REFERENCE POINT DOSAGE GIVEN TO DATE: NORMAL GY
RAD ONC ARIA REFERENCE POINT ID: NORMAL
RBC # BLD AUTO: 2.59 10E12/L (ref 4.4–5.9)
RBC # BLD AUTO: 2.62 10E12/L (ref 4.4–5.9)
RBC # BLD AUTO: 2.64 10E12/L (ref 4.4–5.9)
RBC # BLD AUTO: 2.67 10E12/L (ref 4.4–5.9)
RBC # BLD AUTO: 2.67 10E12/L (ref 4.4–5.9)
RBC # BLD AUTO: 2.69 10E12/L (ref 4.4–5.9)
RBC # BLD AUTO: 2.81 10E12/L (ref 4.4–5.9)
RBC # BLD AUTO: 2.86 10E12/L (ref 4.4–5.9)
RBC # BLD AUTO: 2.96 10E12/L (ref 4.4–5.9)
RBC # BLD AUTO: 2.99 10E12/L (ref 4.4–5.9)
RBC # BLD AUTO: 3.03 10E12/L (ref 4.4–5.9)
RBC # BLD AUTO: 3.08 10E12/L (ref 4.4–5.9)
RBC # BLD AUTO: 3.11 10E12/L (ref 4.4–5.9)
RBC # BLD AUTO: 3.12 10E12/L (ref 4.4–5.9)
RBC # BLD AUTO: 3.24 10E12/L (ref 4.4–5.9)
RBC # BLD AUTO: 3.3 10E12/L (ref 4.4–5.9)
RBC # BLD AUTO: 3.31 10E12/L (ref 4.4–5.9)
RBC # BLD AUTO: 3.4 10E12/L (ref 4.4–5.9)
RBC # BLD AUTO: 3.42 10E12/L (ref 4.4–5.9)
RBC # BLD AUTO: 3.43 10E12/L (ref 4.4–5.9)
RBC # BLD AUTO: 3.53 10E12/L (ref 4.4–5.9)
RBC # BLD AUTO: 3.59 10E12/L (ref 4.4–5.9)
RBC # BLD AUTO: 3.8 10E12/L (ref 4.4–5.9)
RBC # BLD AUTO: 3.8 10E12/L (ref 4.4–5.9)
RBC # BLD AUTO: 4.13 10E12/L (ref 4.4–5.9)
RBC # BLD AUTO: 4.13 10E12/L (ref 4.4–5.9)
RBC # BLD AUTO: 4.23 10E12/L (ref 4.4–5.9)
RBC #/AREA URNS AUTO: 3 /HPF (ref 0–2)
RBC MORPH BLD: ABNORMAL
RBC MORPH BLD: ABNORMAL
RBC MORPH BLD: NORMAL
SODIUM SERPL-SCNC: 128 MMOL/L (ref 134–144)
SODIUM SERPL-SCNC: 128 MMOL/L (ref 134–144)
SODIUM SERPL-SCNC: 131 MMOL/L (ref 134–144)
SODIUM SERPL-SCNC: 132 MMOL/L (ref 134–144)
SODIUM SERPL-SCNC: 133 MMOL/L (ref 134–144)
SODIUM SERPL-SCNC: 134 MMOL/L (ref 134–144)
SODIUM SERPL-SCNC: 134 MMOL/L (ref 134–144)
SODIUM SERPL-SCNC: 135 MMOL/L (ref 134–144)
SODIUM SERPL-SCNC: 136 MMOL/L (ref 134–144)
SODIUM SERPL-SCNC: 137 MMOL/L (ref 134–144)
SODIUM SERPL-SCNC: 137 MMOL/L (ref 134–144)
SOURCE: ABNORMAL
SP GR UR STRIP: 1.03 (ref 1–1.03)
T4 FREE SERPL-MCNC: 1.44 NG/DL (ref 0.6–1.6)
TSH SERPL DL<=0.05 MIU/L-ACNC: 0.51 IU/ML (ref 0.34–5.6)
TSH SERPL DL<=0.05 MIU/L-ACNC: 0.9 IU/ML (ref 0.34–5.6)
TSH SERPL DL<=0.05 MIU/L-ACNC: 1.22 IU/ML (ref 0.34–5.6)
TSH SERPL DL<=0.05 MIU/L-ACNC: 1.35 IU/ML (ref 0.34–5.6)
TSH SERPL DL<=0.05 MIU/L-ACNC: 1.49 IU/ML (ref 0.34–5.6)
TSH SERPL DL<=0.05 MIU/L-ACNC: 1.83 IU/ML (ref 0.34–5.6)
TSH SERPL DL<=0.05 MIU/L-ACNC: <0.1 IU/ML (ref 0.34–5.6)
UROBILINOGEN UR STRIP-MCNC: 2 MG/DL (ref 0–2)
VARIANT LYMPHS BLD QL SMEAR: PRESENT
VARIANT LYMPHS BLD QL SMEAR: PRESENT
WBC # BLD AUTO: 1 10E9/L (ref 4–11)
WBC # BLD AUTO: 1.5 10E9/L (ref 4–11)
WBC # BLD AUTO: 1.6 10E9/L (ref 4–11)
WBC # BLD AUTO: 17.9 10E9/L (ref 4–11)
WBC # BLD AUTO: 2 10E9/L (ref 4–11)
WBC # BLD AUTO: 2.2 10E9/L (ref 4–11)
WBC # BLD AUTO: 2.9 10E9/L (ref 4–11)
WBC # BLD AUTO: 3 10E9/L (ref 4–11)
WBC # BLD AUTO: 3.4 10E9/L (ref 4–11)
WBC # BLD AUTO: 3.4 10E9/L (ref 4–11)
WBC # BLD AUTO: 3.7 10E9/L (ref 4–11)
WBC # BLD AUTO: 4.1 10E9/L (ref 4–11)
WBC # BLD AUTO: 4.3 10E9/L (ref 4–11)
WBC # BLD AUTO: 4.6 10E9/L (ref 4–11)
WBC # BLD AUTO: 4.9 10E9/L (ref 4–11)
WBC # BLD AUTO: 5 10E9/L (ref 4–11)
WBC # BLD AUTO: 5 10E9/L (ref 4–11)
WBC # BLD AUTO: 5.6 10E9/L (ref 4–11)
WBC # BLD AUTO: 6.1 10E9/L (ref 4–11)
WBC # BLD AUTO: 6.1 10E9/L (ref 4–11)
WBC # BLD AUTO: 6.5 10E9/L (ref 4–11)
WBC # BLD AUTO: 6.6 10E9/L (ref 4–11)
WBC # BLD AUTO: 6.6 10E9/L (ref 4–11)
WBC # BLD AUTO: 7.3 10E9/L (ref 4–11)
WBC # BLD AUTO: 7.6 10E9/L (ref 4–11)
WBC # BLD AUTO: 8 10E9/L (ref 4–11)
WBC # BLD AUTO: 8.2 10E9/L (ref 4–11)
WBC #/AREA URNS AUTO: 1 /HPF (ref 0–5)

## 2020-01-01 PROCEDURE — 99215 OFFICE O/P EST HI 40 MIN: CPT | Performed by: INTERNAL MEDICINE

## 2020-01-01 PROCEDURE — 77387 GUIDANCE FOR RADJ TX DLVR: CPT | Performed by: RADIOLOGY

## 2020-01-01 PROCEDURE — 77412 RADIATION TX DELIVERY LVL 3: CPT | Performed by: RADIOLOGY

## 2020-01-01 PROCEDURE — 25000128 H RX IP 250 OP 636: Performed by: INTERNAL MEDICINE

## 2020-01-01 PROCEDURE — 77014 ZZHC CT GUIDE FOR PLACEMENT RADIATION THERAPY FIELDS: CPT | Mod: 26 | Performed by: RADIOLOGY

## 2020-01-01 PROCEDURE — G0463 HOSPITAL OUTPT CLINIC VISIT: HCPCS | Performed by: RADIOLOGY

## 2020-01-01 PROCEDURE — 25800030 ZZH RX IP 258 OP 636: Performed by: NURSE PRACTITIONER

## 2020-01-01 PROCEDURE — 25500064 ZZH RX 255 OP 636: Performed by: INTERNAL MEDICINE

## 2020-01-01 PROCEDURE — 25000132 ZZH RX MED GY IP 250 OP 250 PS 637: Performed by: INTERNAL MEDICINE

## 2020-01-01 PROCEDURE — 96365 THER/PROPH/DIAG IV INF INIT: CPT

## 2020-01-01 PROCEDURE — 43239 EGD BIOPSY SINGLE/MULTIPLE: CPT | Performed by: SURGERY

## 2020-01-01 PROCEDURE — 85025 COMPLETE CBC W/AUTO DIFF WBC: CPT | Performed by: INTERNAL MEDICINE

## 2020-01-01 PROCEDURE — 77290 THER RAD SIMULAJ FIELD CPLX: CPT | Mod: 26 | Performed by: RADIOLOGY

## 2020-01-01 PROCEDURE — 96367 TX/PROPH/DG ADDL SEQ IV INF: CPT

## 2020-01-01 PROCEDURE — 99285 EMERGENCY DEPT VISIT HI MDM: CPT | Mod: Z6 | Performed by: FAMILY MEDICINE

## 2020-01-01 PROCEDURE — 88312 SPECIAL STAINS GROUP 1: CPT

## 2020-01-01 PROCEDURE — 77336 RADIATION PHYSICS CONSULT: CPT | Performed by: RADIOLOGY

## 2020-01-01 PROCEDURE — 77295 3-D RADIOTHERAPY PLAN: CPT | Performed by: RADIOLOGY

## 2020-01-01 PROCEDURE — 96360 HYDRATION IV INFUSION INIT: CPT

## 2020-01-01 PROCEDURE — 77334 RADIATION TREATMENT AID(S): CPT | Performed by: RADIOLOGY

## 2020-01-01 PROCEDURE — 80053 COMPREHEN METABOLIC PANEL: CPT | Mod: ZL | Performed by: INTERNAL MEDICINE

## 2020-01-01 PROCEDURE — 85025 COMPLETE CBC W/AUTO DIFF WBC: CPT | Performed by: NURSE PRACTITIONER

## 2020-01-01 PROCEDURE — 71260 CT THORAX DX C+: CPT

## 2020-01-01 PROCEDURE — 92504 EAR MICROSCOPY EXAMINATION: CPT | Performed by: OTOLARYNGOLOGY

## 2020-01-01 PROCEDURE — 77280 THER RAD SIMULAJ FIELD SMPL: CPT | Performed by: RADIOLOGY

## 2020-01-01 PROCEDURE — 80053 COMPREHEN METABOLIC PANEL: CPT | Performed by: INTERNAL MEDICINE

## 2020-01-01 PROCEDURE — 85025 COMPLETE CBC W/AUTO DIFF WBC: CPT | Mod: ZL | Performed by: NURSE PRACTITIONER

## 2020-01-01 PROCEDURE — 80053 COMPREHEN METABOLIC PANEL: CPT | Mod: ZL | Performed by: NURSE PRACTITIONER

## 2020-01-01 PROCEDURE — 97110 THERAPEUTIC EXERCISES: CPT | Mod: GP

## 2020-01-01 PROCEDURE — 25800030 ZZH RX IP 258 OP 636: Performed by: INTERNAL MEDICINE

## 2020-01-01 PROCEDURE — G0463 HOSPITAL OUTPT CLINIC VISIT: HCPCS

## 2020-01-01 PROCEDURE — A9552 F18 FDG: HCPCS | Performed by: INTERNAL MEDICINE

## 2020-01-01 PROCEDURE — 92611 MOTION FLUOROSCOPY/SWALLOW: CPT | Mod: GN

## 2020-01-01 PROCEDURE — G0463 HOSPITAL OUTPT CLINIC VISIT: HCPCS | Mod: 25 | Performed by: INTERNAL MEDICINE

## 2020-01-01 PROCEDURE — 83690 ASSAY OF LIPASE: CPT | Performed by: INTERNAL MEDICINE

## 2020-01-01 PROCEDURE — 96372 THER/PROPH/DIAG INJ SC/IM: CPT | Mod: XU

## 2020-01-01 PROCEDURE — 25800030 ZZH RX IP 258 OP 636: Performed by: FAMILY MEDICINE

## 2020-01-01 PROCEDURE — 96366 THER/PROPH/DIAG IV INF ADDON: CPT | Performed by: FAMILY MEDICINE

## 2020-01-01 PROCEDURE — 96374 THER/PROPH/DIAG INJ IV PUSH: CPT | Performed by: INTERNAL MEDICINE

## 2020-01-01 PROCEDURE — 25000128 H RX IP 250 OP 636: Performed by: NURSE PRACTITIONER

## 2020-01-01 PROCEDURE — A9503 TC99M MEDRONATE: HCPCS | Performed by: INTERNAL MEDICINE

## 2020-01-01 PROCEDURE — 36415 COLL VENOUS BLD VENIPUNCTURE: CPT | Mod: ZL | Performed by: NURSE PRACTITIONER

## 2020-01-01 PROCEDURE — 77300 RADIATION THERAPY DOSE PLAN: CPT | Performed by: RADIOLOGY

## 2020-01-01 PROCEDURE — 84132 ASSAY OF SERUM POTASSIUM: CPT | Mod: ZL | Performed by: NURSE PRACTITIONER

## 2020-01-01 PROCEDURE — 77417 THER RADIOLOGY PORT IMAGE(S): CPT | Performed by: RADIOLOGY

## 2020-01-01 PROCEDURE — 99214 OFFICE O/P EST MOD 30 MIN: CPT | Performed by: INTERNAL MEDICINE

## 2020-01-01 PROCEDURE — 96361 HYDRATE IV INFUSION ADD-ON: CPT

## 2020-01-01 PROCEDURE — 96413 CHEMO IV INFUSION 1 HR: CPT

## 2020-01-01 PROCEDURE — 81001 URINALYSIS AUTO W/SCOPE: CPT | Mod: ZL | Performed by: FAMILY MEDICINE

## 2020-01-01 PROCEDURE — 83735 ASSAY OF MAGNESIUM: CPT | Mod: ZL | Performed by: NURSE PRACTITIONER

## 2020-01-01 PROCEDURE — 78306 BONE IMAGING WHOLE BODY: CPT

## 2020-01-01 PROCEDURE — 25500064 ZZH RX 255 OP 636: Performed by: FAMILY MEDICINE

## 2020-01-01 PROCEDURE — 96375 TX/PRO/DX INJ NEW DRUG ADDON: CPT

## 2020-01-01 PROCEDURE — 36415 COLL VENOUS BLD VENIPUNCTURE: CPT | Mod: ZL | Performed by: INTERNAL MEDICINE

## 2020-01-01 PROCEDURE — 34300033 ZZH RX 343: Performed by: INTERNAL MEDICINE

## 2020-01-01 PROCEDURE — 95992 CANALITH REPOSITIONING PROC: CPT | Performed by: OTOLARYNGOLOGY

## 2020-01-01 PROCEDURE — 69210 REMOVE IMPACTED EAR WAX UNI: CPT | Performed by: OTOLARYNGOLOGY

## 2020-01-01 PROCEDURE — 88305 TISSUE EXAM BY PATHOLOGIST: CPT

## 2020-01-01 PROCEDURE — 83615 LACTATE (LD) (LDH) ENZYME: CPT | Mod: ZL | Performed by: INTERNAL MEDICINE

## 2020-01-01 PROCEDURE — 86618 LYME DISEASE ANTIBODY: CPT | Performed by: INTERNAL MEDICINE

## 2020-01-01 PROCEDURE — 94060 EVALUATION OF WHEEZING: CPT | Mod: 26 | Performed by: INTERNAL MEDICINE

## 2020-01-01 PROCEDURE — 96417 CHEMO IV INFUS EACH ADDL SEQ: CPT

## 2020-01-01 PROCEDURE — 83615 LACTATE (LD) (LDH) ENZYME: CPT | Performed by: INTERNAL MEDICINE

## 2020-01-01 PROCEDURE — 74177 CT ABD & PELVIS W/CONTRAST: CPT

## 2020-01-01 PROCEDURE — 77300 RADIATION THERAPY DOSE PLAN: CPT | Mod: 26 | Performed by: RADIOLOGY

## 2020-01-01 PROCEDURE — 80053 COMPREHEN METABOLIC PANEL: CPT | Performed by: NURSE PRACTITIONER

## 2020-01-01 PROCEDURE — 80048 BASIC METABOLIC PNL TOTAL CA: CPT | Performed by: INTERNAL MEDICINE

## 2020-01-01 PROCEDURE — 72131 CT LUMBAR SPINE W/O DYE: CPT

## 2020-01-01 PROCEDURE — 84443 ASSAY THYROID STIM HORMONE: CPT | Performed by: NURSE PRACTITIONER

## 2020-01-01 PROCEDURE — G0463 HOSPITAL OUTPT CLINIC VISIT: HCPCS | Mod: 25

## 2020-01-01 PROCEDURE — 84145 PROCALCITONIN (PCT): CPT | Performed by: FAMILY MEDICINE

## 2020-01-01 PROCEDURE — 36415 COLL VENOUS BLD VENIPUNCTURE: CPT | Performed by: INTERNAL MEDICINE

## 2020-01-01 PROCEDURE — 99203 OFFICE O/P NEW LOW 30 MIN: CPT | Performed by: SURGERY

## 2020-01-01 PROCEDURE — 25000128 H RX IP 250 OP 636: Performed by: FAMILY MEDICINE

## 2020-01-01 PROCEDURE — 43239 EGD BIOPSY SINGLE/MULTIPLE: CPT | Performed by: NURSE ANESTHETIST, CERTIFIED REGISTERED

## 2020-01-01 PROCEDURE — 70553 MRI BRAIN STEM W/O & W/DYE: CPT

## 2020-01-01 PROCEDURE — 97161 PT EVAL LOW COMPLEX 20 MIN: CPT | Mod: GP

## 2020-01-01 PROCEDURE — 88341 IMHCHEM/IMCYTCHM EA ADD ANTB: CPT

## 2020-01-01 PROCEDURE — 94726 PLETHYSMOGRAPHY LUNG VOLUMES: CPT

## 2020-01-01 PROCEDURE — 99213 OFFICE O/P EST LOW 20 MIN: CPT | Mod: TEL | Performed by: INTERNAL MEDICINE

## 2020-01-01 PROCEDURE — A9575 INJ GADOTERATE MEGLUMI 0.1ML: HCPCS | Performed by: INTERNAL MEDICINE

## 2020-01-01 PROCEDURE — 94060 EVALUATION OF WHEEZING: CPT

## 2020-01-01 PROCEDURE — 77290 THER RAD SIMULAJ FIELD CPLX: CPT | Performed by: RADIOLOGY

## 2020-01-01 PROCEDURE — 40000275 ZZH STATISTIC RCP TIME EA 10 MIN

## 2020-01-01 PROCEDURE — 99284 EMERGENCY DEPT VISIT MOD MDM: CPT | Mod: 25 | Performed by: INTERNAL MEDICINE

## 2020-01-01 PROCEDURE — 84443 ASSAY THYROID STIM HORMONE: CPT | Performed by: INTERNAL MEDICINE

## 2020-01-01 PROCEDURE — 85025 COMPLETE CBC W/AUTO DIFF WBC: CPT | Mod: ZL | Performed by: FAMILY MEDICINE

## 2020-01-01 PROCEDURE — 94726 PLETHYSMOGRAPHY LUNG VOLUMES: CPT | Mod: 26 | Performed by: INTERNAL MEDICINE

## 2020-01-01 PROCEDURE — 77427 RADIATION TX MANAGEMENT X5: CPT | Performed by: RADIOLOGY

## 2020-01-01 PROCEDURE — 96374 THER/PROPH/DIAG INJ IV PUSH: CPT

## 2020-01-01 PROCEDURE — 77334 RADIATION TREATMENT AID(S): CPT | Mod: 26 | Performed by: RADIOLOGY

## 2020-01-01 PROCEDURE — 96366 THER/PROPH/DIAG IV INF ADDON: CPT

## 2020-01-01 PROCEDURE — 99214 OFFICE O/P EST MOD 30 MIN: CPT | Performed by: FAMILY MEDICINE

## 2020-01-01 PROCEDURE — 40000010 ZZH STATISTIC ANES STAT CODE-CRNA PER MINUTE: Performed by: SURGERY

## 2020-01-01 PROCEDURE — 36591 DRAW BLOOD OFF VENOUS DEVICE: CPT

## 2020-01-01 PROCEDURE — 84145 PROCALCITONIN (PCT): CPT | Mod: ZL | Performed by: FAMILY MEDICINE

## 2020-01-01 PROCEDURE — 25000128 H RX IP 250 OP 636: Performed by: NURSE ANESTHETIST, CERTIFIED REGISTERED

## 2020-01-01 PROCEDURE — 40000809 ZZH STATISTIC NO DOCUMENTATION TO SUPPORT CHARGE

## 2020-01-01 PROCEDURE — 99213 OFFICE O/P EST LOW 20 MIN: CPT | Performed by: FAMILY MEDICINE

## 2020-01-01 PROCEDURE — 77263 THER RADIOLOGY TX PLNG CPLX: CPT | Performed by: RADIOLOGY

## 2020-01-01 PROCEDURE — 71046 X-RAY EXAM CHEST 2 VIEWS: CPT

## 2020-01-01 PROCEDURE — 86140 C-REACTIVE PROTEIN: CPT | Performed by: FAMILY MEDICINE

## 2020-01-01 PROCEDURE — 83615 LACTATE (LD) (LDH) ENZYME: CPT | Performed by: NURSE PRACTITIONER

## 2020-01-01 PROCEDURE — C9113 INJ PANTOPRAZOLE SODIUM, VIA: HCPCS | Performed by: INTERNAL MEDICINE

## 2020-01-01 PROCEDURE — 94729 DIFFUSING CAPACITY: CPT | Mod: 26 | Performed by: INTERNAL MEDICINE

## 2020-01-01 PROCEDURE — 25000125 ZZHC RX 250

## 2020-01-01 PROCEDURE — 86140 C-REACTIVE PROTEIN: CPT | Mod: ZL | Performed by: FAMILY MEDICINE

## 2020-01-01 PROCEDURE — 83605 ASSAY OF LACTIC ACID: CPT | Performed by: FAMILY MEDICINE

## 2020-01-01 PROCEDURE — 74230 X-RAY XM SWLNG FUNCJ C+: CPT | Mod: TC

## 2020-01-01 PROCEDURE — 84439 ASSAY OF FREE THYROXINE: CPT | Performed by: INTERNAL MEDICINE

## 2020-01-01 PROCEDURE — 36415 COLL VENOUS BLD VENIPUNCTURE: CPT | Mod: ZL | Performed by: FAMILY MEDICINE

## 2020-01-01 PROCEDURE — 74177 CT ABD & PELVIS W/CONTRAST: CPT | Mod: TC

## 2020-01-01 PROCEDURE — 25800030 ZZH RX IP 258 OP 636: Performed by: SURGERY

## 2020-01-01 PROCEDURE — 99283 EMERGENCY DEPT VISIT LOW MDM: CPT | Mod: Z6 | Performed by: INTERNAL MEDICINE

## 2020-01-01 PROCEDURE — 85025 COMPLETE CBC W/AUTO DIFF WBC: CPT | Mod: ZL | Performed by: INTERNAL MEDICINE

## 2020-01-01 PROCEDURE — 99207 ZZC NO CHARGE LOS: CPT | Performed by: NURSE PRACTITIONER

## 2020-01-01 PROCEDURE — 85025 COMPLETE CBC W/AUTO DIFF WBC: CPT | Performed by: FAMILY MEDICINE

## 2020-01-01 PROCEDURE — 82378 CARCINOEMBRYONIC ANTIGEN: CPT | Mod: ZL | Performed by: INTERNAL MEDICINE

## 2020-01-01 PROCEDURE — 80053 COMPREHEN METABOLIC PANEL: CPT | Performed by: FAMILY MEDICINE

## 2020-01-01 PROCEDURE — 99213 OFFICE O/P EST LOW 20 MIN: CPT | Mod: 95 | Performed by: RADIOLOGY

## 2020-01-01 PROCEDURE — 77293 RESPIRATOR MOTION MGMT SIMUL: CPT | Mod: 26 | Performed by: RADIOLOGY

## 2020-01-01 PROCEDURE — 77295 3-D RADIOTHERAPY PLAN: CPT | Mod: 26 | Performed by: RADIOLOGY

## 2020-01-01 PROCEDURE — 25000128 H RX IP 250 OP 636: Mod: JW | Performed by: NURSE PRACTITIONER

## 2020-01-01 PROCEDURE — 99203 OFFICE O/P NEW LOW 30 MIN: CPT | Mod: 25 | Performed by: OTOLARYNGOLOGY

## 2020-01-01 PROCEDURE — 94729 DIFFUSING CAPACITY: CPT

## 2020-01-01 PROCEDURE — 88342 IMHCHEM/IMCYTCHM 1ST ANTB: CPT

## 2020-01-01 PROCEDURE — 25000125 ZZHC RX 250: Performed by: SURGERY

## 2020-01-01 PROCEDURE — 96365 THER/PROPH/DIAG IV INF INIT: CPT | Mod: XU | Performed by: FAMILY MEDICINE

## 2020-01-01 PROCEDURE — 78815 PET IMAGE W/CT SKULL-THIGH: CPT | Mod: PS

## 2020-01-01 PROCEDURE — 77293 RESPIRATOR MOTION MGMT SIMUL: CPT | Performed by: RADIOLOGY

## 2020-01-01 PROCEDURE — 77280 THER RAD SIMULAJ FIELD SMPL: CPT | Mod: 26 | Performed by: RADIOLOGY

## 2020-01-01 PROCEDURE — 99205 OFFICE O/P NEW HI 60 MIN: CPT | Mod: 25 | Performed by: RADIOLOGY

## 2020-01-01 PROCEDURE — 99285 EMERGENCY DEPT VISIT HI MDM: CPT | Mod: 25 | Performed by: FAMILY MEDICINE

## 2020-01-01 PROCEDURE — 94640 AIRWAY INHALATION TREATMENT: CPT

## 2020-01-01 RX ORDER — SODIUM CHLORIDE 9 MG/ML
1000 INJECTION, SOLUTION INTRAVENOUS CONTINUOUS PRN
Status: CANCELLED
Start: 2020-01-01

## 2020-01-01 RX ORDER — PALONOSETRON 0.05 MG/ML
0.25 INJECTION, SOLUTION INTRAVENOUS ONCE
Status: CANCELLED
Start: 2020-01-01

## 2020-01-01 RX ORDER — ALBUTEROL SULFATE 90 UG/1
1-2 AEROSOL, METERED RESPIRATORY (INHALATION)
Status: CANCELLED
Start: 2020-01-01

## 2020-01-01 RX ORDER — HEPARIN SODIUM (PORCINE) LOCK FLUSH IV SOLN 100 UNIT/ML 100 UNIT/ML
5 SOLUTION INTRAVENOUS
Status: CANCELLED | OUTPATIENT
Start: 2020-01-01

## 2020-01-01 RX ORDER — HEPARIN SODIUM (PORCINE) LOCK FLUSH IV SOLN 100 UNIT/ML 100 UNIT/ML
500 SOLUTION INTRAVENOUS
Status: CANCELLED | OUTPATIENT
Start: 2020-01-01

## 2020-01-01 RX ORDER — MEPERIDINE HYDROCHLORIDE 50 MG/ML
25 INJECTION INTRAMUSCULAR; INTRAVENOUS; SUBCUTANEOUS EVERY 30 MIN PRN
Status: CANCELLED | OUTPATIENT
Start: 2020-01-01

## 2020-01-01 RX ORDER — METHYLPREDNISOLONE SODIUM SUCCINATE 125 MG/2ML
125 INJECTION, POWDER, LYOPHILIZED, FOR SOLUTION INTRAMUSCULAR; INTRAVENOUS
Status: CANCELLED
Start: 2020-01-01

## 2020-01-01 RX ORDER — HEPARIN SODIUM (PORCINE) LOCK FLUSH IV SOLN 100 UNIT/ML 100 UNIT/ML
500 SOLUTION INTRAVENOUS
Status: DISCONTINUED | OUTPATIENT
Start: 2020-01-01 | End: 2020-01-01 | Stop reason: HOSPADM

## 2020-01-01 RX ORDER — HEPARIN SODIUM (PORCINE) LOCK FLUSH IV SOLN 100 UNIT/ML 100 UNIT/ML
5 SOLUTION INTRAVENOUS
Status: DISCONTINUED | OUTPATIENT
Start: 2020-01-01 | End: 2020-01-01 | Stop reason: HOSPADM

## 2020-01-01 RX ORDER — NALOXONE HYDROCHLORIDE 0.4 MG/ML
.1-.4 INJECTION, SOLUTION INTRAMUSCULAR; INTRAVENOUS; SUBCUTANEOUS
Status: CANCELLED | OUTPATIENT
Start: 2020-01-01

## 2020-01-01 RX ORDER — EPINEPHRINE 0.3 MG/.3ML
0.3 INJECTION SUBCUTANEOUS EVERY 5 MIN PRN
Status: CANCELLED | OUTPATIENT
Start: 2020-01-01

## 2020-01-01 RX ORDER — LORAZEPAM 2 MG/ML
0.5 INJECTION INTRAMUSCULAR EVERY 4 HOURS PRN
Status: CANCELLED
Start: 2020-01-01

## 2020-01-01 RX ORDER — ALBUTEROL SULFATE 0.83 MG/ML
2.5 SOLUTION RESPIRATORY (INHALATION)
Status: CANCELLED | OUTPATIENT
Start: 2020-01-01

## 2020-01-01 RX ORDER — EPINEPHRINE 1 MG/ML
0.3 INJECTION, SOLUTION, CONCENTRATE INTRAVENOUS EVERY 5 MIN PRN
Status: CANCELLED | OUTPATIENT
Start: 2020-01-01

## 2020-01-01 RX ORDER — DIPHENHYDRAMINE HYDROCHLORIDE 50 MG/ML
50 INJECTION INTRAMUSCULAR; INTRAVENOUS
Status: CANCELLED
Start: 2020-01-01

## 2020-01-01 RX ORDER — LOPERAMIDE HCL 2 MG
2 CAPSULE ORAL 4 TIMES DAILY PRN
COMMUNITY

## 2020-01-01 RX ORDER — SODIUM CHLORIDE, SODIUM LACTATE, POTASSIUM CHLORIDE, CALCIUM CHLORIDE 600; 310; 30; 20 MG/100ML; MG/100ML; MG/100ML; MG/100ML
INJECTION, SOLUTION INTRAVENOUS CONTINUOUS
Status: DISCONTINUED | OUTPATIENT
Start: 2020-01-01 | End: 2020-01-01 | Stop reason: HOSPADM

## 2020-01-01 RX ORDER — HYDROCHLOROTHIAZIDE 12.5 MG/1
12.5 TABLET ORAL DAILY
Qty: 90 TABLET | Refills: 3 | Status: SHIPPED | OUTPATIENT
Start: 2020-01-01 | End: 2020-01-01

## 2020-01-01 RX ORDER — DRONABINOL 5 MG/1
5 CAPSULE ORAL
Qty: 60 CAPSULE | Refills: 1 | Status: SHIPPED | OUTPATIENT
Start: 2020-01-01

## 2020-01-01 RX ORDER — HEPARIN SODIUM (PORCINE) LOCK FLUSH IV SOLN 100 UNIT/ML 100 UNIT/ML
500 SOLUTION INTRAVENOUS
Status: COMPLETED | OUTPATIENT
Start: 2020-01-01 | End: 2020-01-01

## 2020-01-01 RX ORDER — HEPARIN SODIUM (PORCINE) LOCK FLUSH IV SOLN 100 UNIT/ML 100 UNIT/ML
500 SOLUTION INTRAVENOUS EVERY 8 HOURS
Status: DISCONTINUED | OUTPATIENT
Start: 2020-01-01 | End: 2020-01-01 | Stop reason: HOSPADM

## 2020-01-01 RX ORDER — ONDANSETRON 2 MG/ML
4 INJECTION INTRAMUSCULAR; INTRAVENOUS
Status: DISCONTINUED | OUTPATIENT
Start: 2020-01-01 | End: 2020-01-01 | Stop reason: HOSPADM

## 2020-01-01 RX ORDER — LEVOFLOXACIN 500 MG/1
500 TABLET, FILM COATED ORAL DAILY
Qty: 7 TABLET | Refills: 0 | Status: SHIPPED | OUTPATIENT
Start: 2020-01-01 | End: 2020-01-01

## 2020-01-01 RX ORDER — GABAPENTIN 300 MG/1
600 CAPSULE ORAL 3 TIMES DAILY
Qty: 180 CAPSULE | Refills: 2 | Status: SHIPPED | OUTPATIENT
Start: 2020-01-01 | End: 2020-01-01

## 2020-01-01 RX ORDER — HEPARIN SODIUM (PORCINE) LOCK FLUSH IV SOLN 100 UNIT/ML 100 UNIT/ML
500 SOLUTION INTRAVENOUS
Status: CANCELLED
Start: 2020-01-01

## 2020-01-01 RX ORDER — TRIAMCINOLONE ACETONIDE 1 MG/G
CREAM TOPICAL 2 TIMES DAILY
Qty: 30 G | Refills: 1 | Status: SHIPPED | OUTPATIENT
Start: 2020-01-01

## 2020-01-01 RX ORDER — PROPOFOL 10 MG/ML
INJECTION, EMULSION INTRAVENOUS CONTINUOUS PRN
Status: DISCONTINUED | OUTPATIENT
Start: 2020-01-01 | End: 2020-01-01

## 2020-01-01 RX ORDER — PREDNISONE 20 MG/1
40 TABLET ORAL 2 TIMES DAILY
Qty: 84 TABLET | Refills: 0 | Status: SHIPPED | OUTPATIENT
Start: 2020-01-01 | End: 2020-01-01

## 2020-01-01 RX ORDER — AZITHROMYCIN 250 MG/1
TABLET, FILM COATED ORAL
Qty: 6 TABLET | Refills: 0 | Status: SHIPPED | OUTPATIENT
Start: 2020-01-01 | End: 2020-01-01

## 2020-01-01 RX ORDER — POLYETHYLENE GLYCOL 3350 17 G/17G
17 POWDER, FOR SOLUTION ORAL DAILY
COMMUNITY
End: 2020-01-01

## 2020-01-01 RX ORDER — OFLOXACIN 3 MG/ML
5 SOLUTION AURICULAR (OTIC) 2 TIMES DAILY
Qty: 5 ML | Refills: 1 | Status: SHIPPED | OUTPATIENT
Start: 2020-01-01 | End: 2020-01-01

## 2020-01-01 RX ORDER — LIDOCAINE 40 MG/G
CREAM TOPICAL
Status: DISCONTINUED | OUTPATIENT
Start: 2020-01-01 | End: 2020-01-01 | Stop reason: HOSPADM

## 2020-01-01 RX ORDER — OXYCODONE HYDROCHLORIDE 5 MG/1
5 TABLET ORAL EVERY 6 HOURS PRN
Qty: 100 TABLET | Refills: 0 | Status: SHIPPED | OUTPATIENT
Start: 2020-01-01 | End: 2020-01-01

## 2020-01-01 RX ORDER — FLUMAZENIL 0.1 MG/ML
0.2 INJECTION, SOLUTION INTRAVENOUS
Status: DISCONTINUED | OUTPATIENT
Start: 2020-01-01 | End: 2020-01-01 | Stop reason: HOSPADM

## 2020-01-01 RX ORDER — GABAPENTIN 300 MG/1
300 CAPSULE ORAL 3 TIMES DAILY
Qty: 90 CAPSULE | Refills: 1 | Status: SHIPPED | OUTPATIENT
Start: 2020-01-01 | End: 2020-01-01

## 2020-01-01 RX ORDER — HYDROCHLOROTHIAZIDE 12.5 MG/1
12.5 TABLET ORAL DAILY
Qty: 30 TABLET | Refills: 0 | Status: SHIPPED | OUTPATIENT
Start: 2020-01-01 | End: 2020-01-01

## 2020-01-01 RX ORDER — PALONOSETRON 0.05 MG/ML
0.25 INJECTION, SOLUTION INTRAVENOUS ONCE
Status: COMPLETED | OUTPATIENT
Start: 2020-01-01 | End: 2020-01-01

## 2020-01-01 RX ORDER — ESOMEPRAZOLE MAGNESIUM 40 MG/1
40 CAPSULE, DELAYED RELEASE ORAL
Qty: 30 CAPSULE | Refills: 3 | Status: SHIPPED | OUTPATIENT
Start: 2020-01-01 | End: 2020-01-01

## 2020-01-01 RX ORDER — LORAZEPAM 0.5 MG/1
0.5 TABLET ORAL EVERY 4 HOURS PRN
Qty: 30 TABLET | Refills: 2 | Status: SHIPPED | OUTPATIENT
Start: 2020-01-01

## 2020-01-01 RX ORDER — LIDOCAINE HYDROCHLORIDE 20 MG/ML
SOLUTION OROPHARYNGEAL
COMMUNITY
Start: 2020-01-01 | End: 2020-01-01

## 2020-01-01 RX ORDER — BARIUM SULFATE 400 MG/ML
SUSPENSION ORAL ONCE
Status: COMPLETED | OUTPATIENT
Start: 2020-01-01 | End: 2020-01-01

## 2020-01-01 RX ORDER — NYSTATIN 100000 U/G
CREAM TOPICAL 2 TIMES DAILY
Qty: 30 G | Refills: 1 | Status: SHIPPED | OUTPATIENT
Start: 2020-01-01

## 2020-01-01 RX ORDER — SUCRALFATE ORAL 1 G/10ML
1 SUSPENSION ORAL 4 TIMES DAILY
Qty: 420 ML | Refills: 3 | Status: SHIPPED | OUTPATIENT
Start: 2020-01-01 | End: 2020-01-01

## 2020-01-01 RX ORDER — TRIAMCINOLONE ACETONIDE 1 MG/G
CREAM TOPICAL 2 TIMES DAILY
Qty: 30 G | Refills: 1 | Status: SHIPPED | OUTPATIENT
Start: 2020-01-01 | End: 2020-01-01

## 2020-01-01 RX ORDER — NYSTATIN 100000/ML
500000 SUSPENSION, ORAL (FINAL DOSE FORM) ORAL 4 TIMES DAILY
Qty: 400 ML | Refills: 1 | Status: SHIPPED | OUTPATIENT
Start: 2020-01-01 | End: 2020-01-01

## 2020-01-01 RX ORDER — POTASSIUM CHLORIDE 750 MG/1
20 TABLET, EXTENDED RELEASE ORAL DAILY
Qty: 60 TABLET | Refills: 1 | Status: SHIPPED | OUTPATIENT
Start: 2020-01-01 | End: 2020-01-01

## 2020-01-01 RX ORDER — ACETAMINOPHEN 500 MG
1000 TABLET ORAL EVERY 6 HOURS PRN
COMMUNITY

## 2020-01-01 RX ORDER — METOPROLOL SUCCINATE 50 MG/1
50 TABLET, EXTENDED RELEASE ORAL DAILY
Qty: 90 TABLET | Refills: 3 | Status: SHIPPED | OUTPATIENT
Start: 2020-01-01

## 2020-01-01 RX ORDER — CEFTRIAXONE SODIUM 1 G/50ML
1 INJECTION, SOLUTION INTRAVENOUS ONCE
Status: COMPLETED | OUTPATIENT
Start: 2020-01-01 | End: 2020-01-01

## 2020-01-01 RX ORDER — TRIAMCINOLONE ACETONIDE 1 MG/G
CREAM TOPICAL 2 TIMES DAILY
Qty: 30 G | Refills: 1 | OUTPATIENT
Start: 2020-01-01

## 2020-01-01 RX ORDER — POTASSIUM CHLORIDE 1.5 G/1.58G
20 POWDER, FOR SOLUTION ORAL DAILY
Qty: 30 PACKET | Refills: 3 | Status: SHIPPED | OUTPATIENT
Start: 2020-01-01 | End: 2020-01-01

## 2020-01-01 RX ORDER — ALBUTEROL SULFATE 0.83 MG/ML
2.5 SOLUTION RESPIRATORY (INHALATION)
Status: COMPLETED | OUTPATIENT
Start: 2020-01-01 | End: 2020-01-01

## 2020-01-01 RX ORDER — ATORVASTATIN CALCIUM 10 MG/1
10 TABLET, FILM COATED ORAL DAILY
Qty: 90 TABLET | Refills: 0 | Status: SHIPPED | OUTPATIENT
Start: 2020-01-01 | End: 2020-01-01

## 2020-01-01 RX ORDER — OXYCODONE HYDROCHLORIDE 5 MG/1
5 TABLET ORAL ONCE
Status: COMPLETED | OUTPATIENT
Start: 2020-01-01 | End: 2020-01-01

## 2020-01-01 RX ORDER — NYSTATIN 100000 U/G
CREAM TOPICAL 2 TIMES DAILY
Qty: 30 G | Refills: 0 | Status: SHIPPED | OUTPATIENT
Start: 2020-01-01 | End: 2020-01-01

## 2020-01-01 RX ORDER — POTASSIUM CHLORIDE 750 MG/1
20 TABLET, EXTENDED RELEASE ORAL DAILY
COMMUNITY
Start: 2020-01-01 | End: 2020-01-01

## 2020-01-01 RX ORDER — PREDNISONE 20 MG/1
TABLET ORAL
Qty: 84 TABLET | Refills: 0 | COMMUNITY
Start: 2020-01-01 | End: 2020-01-01

## 2020-01-01 RX ORDER — NALOXONE HYDROCHLORIDE 0.4 MG/ML
.1-.4 INJECTION, SOLUTION INTRAMUSCULAR; INTRAVENOUS; SUBCUTANEOUS
Status: DISCONTINUED | OUTPATIENT
Start: 2020-01-01 | End: 2020-01-01 | Stop reason: HOSPADM

## 2020-01-01 RX ORDER — LOSARTAN POTASSIUM 25 MG/1
25 TABLET ORAL DAILY
Qty: 90 TABLET | Refills: 3 | Status: SHIPPED | OUTPATIENT
Start: 2020-01-01

## 2020-01-01 RX ORDER — TC 99M MEDRONATE 20 MG/10ML
26.3 INJECTION, POWDER, LYOPHILIZED, FOR SOLUTION INTRAVENOUS ONCE
Status: COMPLETED | OUTPATIENT
Start: 2020-01-01 | End: 2020-01-01

## 2020-01-01 RX ORDER — ATORVASTATIN CALCIUM 10 MG/1
10 TABLET, FILM COATED ORAL DAILY
Qty: 90 TABLET | Refills: 3 | Status: SHIPPED | OUTPATIENT
Start: 2020-01-01

## 2020-01-01 RX ORDER — GADOTERATE MEGLUMINE 376.9 MG/ML
15 INJECTION INTRAVENOUS ONCE
Status: COMPLETED | OUTPATIENT
Start: 2020-01-01 | End: 2020-01-01

## 2020-01-01 RX ORDER — CETIRIZINE HYDROCHLORIDE 10 MG/1
10 TABLET ORAL DAILY
COMMUNITY

## 2020-01-01 RX ORDER — POTASSIUM CHLORIDE 7.45 MG/ML
10 INJECTION INTRAVENOUS
Status: COMPLETED | OUTPATIENT
Start: 2020-01-01 | End: 2020-01-01

## 2020-01-01 RX ORDER — PROCHLORPERAZINE MALEATE 10 MG
10 TABLET ORAL EVERY 6 HOURS PRN
Qty: 30 TABLET | Refills: 2 | Status: SHIPPED | OUTPATIENT
Start: 2020-01-01

## 2020-01-01 RX ORDER — POTASSIUM CHLORIDE 7.45 MG/ML
10 INJECTION INTRAVENOUS ONCE
Status: COMPLETED | OUTPATIENT
Start: 2020-01-01 | End: 2020-01-01

## 2020-01-01 RX ORDER — METHYLPREDNISOLONE SODIUM SUCCINATE 125 MG/2ML
125 INJECTION, POWDER, LYOPHILIZED, FOR SOLUTION INTRAMUSCULAR; INTRAVENOUS ONCE
Status: COMPLETED | OUTPATIENT
Start: 2020-01-01 | End: 2020-01-01

## 2020-01-01 RX ORDER — ASCORBIC ACID 500 MG
500 TABLET ORAL DAILY
COMMUNITY

## 2020-01-01 RX ORDER — OXYCODONE HYDROCHLORIDE 5 MG/1
5 TABLET ORAL EVERY 6 HOURS PRN
Qty: 60 TABLET | Refills: 0 | Status: SHIPPED | OUTPATIENT
Start: 2020-01-01

## 2020-01-01 RX ORDER — NYSTATIN 100000 U/G
CREAM TOPICAL 2 TIMES DAILY
Qty: 30 G | Refills: 1 | OUTPATIENT
Start: 2020-01-01

## 2020-01-01 RX ORDER — DOCUSATE SODIUM 100 MG/1
100 CAPSULE, LIQUID FILLED ORAL PRN
COMMUNITY
End: 2020-01-01

## 2020-01-01 RX ORDER — HEPARIN SODIUM (PORCINE) LOCK FLUSH IV SOLN 100 UNIT/ML 100 UNIT/ML
500 SOLUTION INTRAVENOUS ONCE
Status: COMPLETED | OUTPATIENT
Start: 2020-01-01 | End: 2020-01-01

## 2020-01-01 RX ORDER — MULTIVITAMIN WITH IRON
100 TABLET ORAL DAILY
Qty: 90 TABLET | Refills: 3 | Status: SHIPPED | OUTPATIENT
Start: 2020-01-01

## 2020-01-01 RX ORDER — ATORVASTATIN CALCIUM 10 MG/1
10 TABLET, FILM COATED ORAL DAILY
Qty: 90 TABLET | Refills: 0 | OUTPATIENT
Start: 2020-01-01

## 2020-01-01 RX ORDER — PROPOFOL 10 MG/ML
INJECTION, EMULSION INTRAVENOUS PRN
Status: DISCONTINUED | OUTPATIENT
Start: 2020-01-01 | End: 2020-01-01

## 2020-01-01 RX ADMIN — ATEZOLIZUMAB 1200 MG: 1200 INJECTION, SOLUTION INTRAVENOUS at 10:38

## 2020-01-01 RX ADMIN — GADOTERATE MEGLUMINE 15 ML: 376.9 INJECTION INTRAVENOUS at 11:13

## 2020-01-01 RX ADMIN — SODIUM CHLORIDE 1000 ML: 9 INJECTION, SOLUTION INTRAVENOUS at 13:22

## 2020-01-01 RX ADMIN — ATEZOLIZUMAB 1200 MG: 1200 INJECTION, SOLUTION INTRAVENOUS at 10:11

## 2020-01-01 RX ADMIN — SODIUM CHLORIDE 1000 ML: 9 INJECTION, SOLUTION INTRAVENOUS at 10:06

## 2020-01-01 RX ADMIN — Medication 500 UNITS: at 15:08

## 2020-01-01 RX ADMIN — SODIUM CHLORIDE 1000 ML: 9 INJECTION, SOLUTION INTRAVENOUS at 14:05

## 2020-01-01 RX ADMIN — DEXAMETHASONE SODIUM PHOSPHATE 12 MG: 10 INJECTION, SOLUTION INTRAMUSCULAR; INTRAVENOUS at 12:25

## 2020-01-01 RX ADMIN — Medication 500 UNITS: at 10:38

## 2020-01-01 RX ADMIN — SODIUM CHLORIDE 250 ML: 9 INJECTION, SOLUTION INTRAVENOUS at 09:09

## 2020-01-01 RX ADMIN — SODIUM CHLORIDE 250 ML: 9 INJECTION, SOLUTION INTRAVENOUS at 09:19

## 2020-01-01 RX ADMIN — Medication 500 UNITS: at 10:47

## 2020-01-01 RX ADMIN — DEXAMETHASONE SODIUM PHOSPHATE 12 MG: 10 INJECTION, SOLUTION INTRAMUSCULAR; INTRAVENOUS at 11:40

## 2020-01-01 RX ADMIN — TOPOTECAN 7.4 MG: 1 INJECTION, SOLUTION, CONCENTRATE INTRAVENOUS at 12:20

## 2020-01-01 RX ADMIN — Medication 500 UNITS: at 10:39

## 2020-01-01 RX ADMIN — SODIUM CHLORIDE 1000 ML: 9 INJECTION, SOLUTION INTRAVENOUS at 11:21

## 2020-01-01 RX ADMIN — Medication 500 UNITS: at 11:45

## 2020-01-01 RX ADMIN — SODIUM CHLORIDE, POTASSIUM CHLORIDE, SODIUM LACTATE AND CALCIUM CHLORIDE: 600; 310; 30; 20 INJECTION, SOLUTION INTRAVENOUS at 07:10

## 2020-01-01 RX ADMIN — SODIUM CHLORIDE 250 ML: 9 INJECTION, SOLUTION INTRAVENOUS at 09:14

## 2020-01-01 RX ADMIN — SODIUM CHLORIDE 250 ML: 9 INJECTION, SOLUTION INTRAVENOUS at 11:36

## 2020-01-01 RX ADMIN — POTASSIUM CHLORIDE 10 MEQ: 7.46 INJECTION, SOLUTION INTRAVENOUS at 13:39

## 2020-01-01 RX ADMIN — BARIUM SULFATE 40 ML: 400 SUSPENSION ORAL at 14:29

## 2020-01-01 RX ADMIN — TOPOTECAN 7.4 MG: 1 INJECTION, SOLUTION, CONCENTRATE INTRAVENOUS at 12:23

## 2020-01-01 RX ADMIN — Medication 500 UNITS: at 15:23

## 2020-01-01 RX ADMIN — PROPOFOL 120 MCG/KG/MIN: 10 INJECTION, EMULSION INTRAVENOUS at 07:28

## 2020-01-01 RX ADMIN — Medication 500 UNITS: at 14:45

## 2020-01-01 RX ADMIN — TOPOTECAN 7.4 MG: 1 INJECTION, SOLUTION, CONCENTRATE INTRAVENOUS at 12:56

## 2020-01-01 RX ADMIN — METHYLPREDNISOLONE SODIUM SUCCINATE 125 MG: 125 INJECTION, POWDER, FOR SOLUTION INTRAMUSCULAR; INTRAVENOUS at 10:31

## 2020-01-01 RX ADMIN — FLUDEOXYGLUCOSE F-18 13.61 MCI.: 500 INJECTION, SOLUTION INTRAVENOUS at 14:18

## 2020-01-01 RX ADMIN — BARIUM SULFATE 30 ML: 400 SUSPENSION ORAL at 14:30

## 2020-01-01 RX ADMIN — SODIUM CHLORIDE 250 ML: 9 INJECTION, SOLUTION INTRAVENOUS at 09:43

## 2020-01-01 RX ADMIN — SODIUM CHLORIDE 250 ML: 9 INJECTION, SOLUTION INTRAVENOUS at 09:12

## 2020-01-01 RX ADMIN — SODIUM CHLORIDE 250 ML: 9 INJECTION, SOLUTION INTRAVENOUS at 12:19

## 2020-01-01 RX ADMIN — ETOPOSIDE 200 MG: 20 INJECTION, SOLUTION, CONCENTRATE INTRAVENOUS at 09:43

## 2020-01-01 RX ADMIN — PALONOSETRON 0.25 MG: 0.05 INJECTION, SOLUTION INTRAVENOUS at 09:51

## 2020-01-01 RX ADMIN — ETOPOSIDE 160 MG: 20 INJECTION, SOLUTION, CONCENTRATE INTRAVENOUS at 11:49

## 2020-01-01 RX ADMIN — DEXAMETHASONE SODIUM PHOSPHATE 12 MG: 10 INJECTION, SOLUTION INTRAMUSCULAR; INTRAVENOUS at 12:20

## 2020-01-01 RX ADMIN — CARBOPLATIN 600 MG: 10 INJECTION, SOLUTION INTRAVENOUS at 11:06

## 2020-01-01 RX ADMIN — Medication 5 ML: at 11:37

## 2020-01-01 RX ADMIN — Medication 500 UNITS: at 11:01

## 2020-01-01 RX ADMIN — POTASSIUM CHLORIDE 10 MEQ: 7.46 INJECTION, SOLUTION INTRAVENOUS at 11:06

## 2020-01-01 RX ADMIN — PANTOPRAZOLE SODIUM 40 MG: 40 INJECTION, POWDER, FOR SOLUTION INTRAVENOUS at 18:47

## 2020-01-01 RX ADMIN — SODIUM CHLORIDE 250 ML: 9 INJECTION, SOLUTION INTRAVENOUS at 09:51

## 2020-01-01 RX ADMIN — ETOPOSIDE 160 MG: 20 INJECTION, SOLUTION, CONCENTRATE INTRAVENOUS at 09:09

## 2020-01-01 RX ADMIN — CEFTRIAXONE SODIUM 1 G: 1 INJECTION, SOLUTION INTRAVENOUS at 10:49

## 2020-01-01 RX ADMIN — SODIUM CHLORIDE 250 ML: 9 INJECTION, SOLUTION INTRAVENOUS at 13:41

## 2020-01-01 RX ADMIN — IOHEXOL 100 ML: 350 INJECTION, SOLUTION INTRAVENOUS at 09:39

## 2020-01-01 RX ADMIN — SODIUM CHLORIDE 150 MG: 900 INJECTION, SOLUTION INTRAVENOUS at 09:22

## 2020-01-01 RX ADMIN — TOPOTECAN 7.4 MG: 1 INJECTION, SOLUTION, CONCENTRATE INTRAVENOUS at 12:13

## 2020-01-01 RX ADMIN — Medication 500 UNITS: at 10:32

## 2020-01-01 RX ADMIN — SODIUM CHLORIDE 250 ML: 9 INJECTION, SOLUTION INTRAVENOUS at 14:05

## 2020-01-01 RX ADMIN — ETOPOSIDE 160 MG: 20 INJECTION, SOLUTION, CONCENTRATE INTRAVENOUS at 09:15

## 2020-01-01 RX ADMIN — SODIUM CHLORIDE 250 ML: 9 INJECTION, SOLUTION INTRAVENOUS at 12:00

## 2020-01-01 RX ADMIN — ETOPOSIDE 160 MG: 20 INJECTION, SOLUTION, CONCENTRATE INTRAVENOUS at 11:18

## 2020-01-01 RX ADMIN — ETOPOSIDE 160 MG: 20 INJECTION, SOLUTION, CONCENTRATE INTRAVENOUS at 09:24

## 2020-01-01 RX ADMIN — IOHEXOL 100 ML: 350 INJECTION, SOLUTION INTRAVENOUS at 03:00

## 2020-01-01 RX ADMIN — Medication 500 UNITS: at 10:46

## 2020-01-01 RX ADMIN — SODIUM CHLORIDE 1000 ML: 9 INJECTION, SOLUTION INTRAVENOUS at 14:16

## 2020-01-01 RX ADMIN — SODIUM CHLORIDE 250 ML: 9 INJECTION, SOLUTION INTRAVENOUS at 12:24

## 2020-01-01 RX ADMIN — DEXAMETHASONE SODIUM PHOSPHATE 12 MG: 10 INJECTION, SOLUTION INTRAMUSCULAR; INTRAVENOUS at 11:36

## 2020-01-01 RX ADMIN — ETOPOSIDE 160 MG: 20 INJECTION, SOLUTION, CONCENTRATE INTRAVENOUS at 09:19

## 2020-01-01 RX ADMIN — Medication 500 UNITS: at 10:31

## 2020-01-01 RX ADMIN — SODIUM CHLORIDE 1000 ML: 9 INJECTION, SOLUTION INTRAVENOUS at 15:02

## 2020-01-01 RX ADMIN — POTASSIUM CHLORIDE 10 MEQ: 7.46 INJECTION, SOLUTION INTRAVENOUS at 12:36

## 2020-01-01 RX ADMIN — Medication 500 UNITS: at 16:12

## 2020-01-01 RX ADMIN — Medication 500 UNITS: at 08:30

## 2020-01-01 RX ADMIN — SODIUM CHLORIDE 150 MG: 9 INJECTION, SOLUTION INTRAVENOUS at 09:56

## 2020-01-01 RX ADMIN — SODIUM CHLORIDE, POTASSIUM CHLORIDE, SODIUM LACTATE AND CALCIUM CHLORIDE 1000 ML: 600; 310; 30; 20 INJECTION, SOLUTION INTRAVENOUS at 18:47

## 2020-01-01 RX ADMIN — SODIUM CHLORIDE 250 ML: 9 INJECTION, SOLUTION INTRAVENOUS at 11:29

## 2020-01-01 RX ADMIN — FAMOTIDINE 20 MG: 20 INJECTION, SOLUTION INTRAVENOUS at 10:50

## 2020-01-01 RX ADMIN — Medication 5 ML: at 14:04

## 2020-01-01 RX ADMIN — Medication 5 ML: at 13:46

## 2020-01-01 RX ADMIN — Medication 5 ML: at 13:34

## 2020-01-01 RX ADMIN — SODIUM CHLORIDE 1000 ML: 9 INJECTION, SOLUTION INTRAVENOUS at 14:14

## 2020-01-01 RX ADMIN — DEXAMETHASONE SODIUM PHOSPHATE 12 MG: 10 INJECTION, SOLUTION INTRAMUSCULAR; INTRAVENOUS at 11:48

## 2020-01-01 RX ADMIN — PALONOSETRON 0.25 MG: 0.05 INJECTION, SOLUTION INTRAVENOUS at 10:09

## 2020-01-01 RX ADMIN — DEXAMETHASONE SODIUM PHOSPHATE 12 MG: 10 INJECTION, SOLUTION INTRAMUSCULAR; INTRAVENOUS at 11:29

## 2020-01-01 RX ADMIN — Medication 500 UNITS: at 12:09

## 2020-01-01 RX ADMIN — Medication 5 ML: at 13:23

## 2020-01-01 RX ADMIN — Medication 500 UNITS: at 12:10

## 2020-01-01 RX ADMIN — SODIUM CHLORIDE 1000 ML: 9 INJECTION, SOLUTION INTRAVENOUS at 13:52

## 2020-01-01 RX ADMIN — SODIUM CHLORIDE 250 ML: 9 INJECTION, SOLUTION INTRAVENOUS at 11:48

## 2020-01-01 RX ADMIN — TOPOTECAN 7.4 MG: 1 INJECTION, SOLUTION, CONCENTRATE INTRAVENOUS at 12:28

## 2020-01-01 RX ADMIN — Medication 5 ML: at 13:00

## 2020-01-01 RX ADMIN — SODIUM CHLORIDE, POTASSIUM CHLORIDE, SODIUM LACTATE AND CALCIUM CHLORIDE 1000 ML: 600; 310; 30; 20 INJECTION, SOLUTION INTRAVENOUS at 19:52

## 2020-01-01 RX ADMIN — SODIUM CHLORIDE 250 ML: 9 INJECTION, SOLUTION INTRAVENOUS at 09:23

## 2020-01-01 RX ADMIN — SODIUM CHLORIDE 150 MG: 9 INJECTION, SOLUTION INTRAVENOUS at 10:09

## 2020-01-01 RX ADMIN — TOPOTECAN 7.4 MG: 1 INJECTION, SOLUTION, CONCENTRATE INTRAVENOUS at 12:22

## 2020-01-01 RX ADMIN — TOPOTECAN 7.4 MG: 1 INJECTION, SOLUTION, CONCENTRATE INTRAVENOUS at 13:07

## 2020-01-01 RX ADMIN — ALBUTEROL SULFATE 2.5 MG: 2.5 SOLUTION RESPIRATORY (INHALATION) at 11:08

## 2020-01-01 RX ADMIN — DEXTROSE AND SODIUM CHLORIDE: 5; 900 INJECTION, SOLUTION INTRAVENOUS at 04:49

## 2020-01-01 RX ADMIN — SODIUM CHLORIDE 1000 ML: 9 INJECTION, SOLUTION INTRAVENOUS at 12:24

## 2020-01-01 RX ADMIN — Medication 500 UNITS: at 14:43

## 2020-01-01 RX ADMIN — PALONOSETRON 0.25 MG: 0.05 INJECTION, SOLUTION INTRAVENOUS at 09:18

## 2020-01-01 RX ADMIN — CARBOPLATIN 600 MG: 10 INJECTION, SOLUTION INTRAVENOUS at 10:25

## 2020-01-01 RX ADMIN — Medication 500 UNITS: at 14:56

## 2020-01-01 RX ADMIN — ETOPOSIDE 160 MG: 20 INJECTION, SOLUTION, CONCENTRATE INTRAVENOUS at 11:43

## 2020-01-01 RX ADMIN — DEXAMETHASONE SODIUM PHOSPHATE 12 MG: 10 INJECTION, SOLUTION INTRAMUSCULAR; INTRAVENOUS at 11:47

## 2020-01-01 RX ADMIN — SODIUM CHLORIDE 1000 ML: 9 INJECTION, SOLUTION INTRAVENOUS at 10:14

## 2020-01-01 RX ADMIN — Medication 5 ML: at 13:15

## 2020-01-01 RX ADMIN — SODIUM CHLORIDE 250 ML: 9 INJECTION, SOLUTION INTRAVENOUS at 10:04

## 2020-01-01 RX ADMIN — CARBOPLATIN 600 MG: 10 INJECTION, SOLUTION INTRAVENOUS at 11:13

## 2020-01-01 RX ADMIN — SODIUM CHLORIDE 1000 ML: 9 INJECTION, SOLUTION INTRAVENOUS at 10:49

## 2020-01-01 RX ADMIN — Medication 5 ML: at 13:24

## 2020-01-01 RX ADMIN — SODIUM CHLORIDE 250 ML: 9 INJECTION, SOLUTION INTRAVENOUS at 11:46

## 2020-01-01 RX ADMIN — SODIUM CHLORIDE 250 ML: 9 INJECTION, SOLUTION INTRAVENOUS at 09:57

## 2020-01-01 RX ADMIN — GADOTERATE MEGLUMINE 15 ML: 376.9 INJECTION INTRAVENOUS at 12:30

## 2020-01-01 RX ADMIN — TC 99M MEDRONATE 26.3 MCI.: 20 INJECTION, POWDER, LYOPHILIZED, FOR SOLUTION INTRAVENOUS at 07:25

## 2020-01-01 RX ADMIN — ATEZOLIZUMAB 1200 MG: 1200 INJECTION, SOLUTION INTRAVENOUS at 14:05

## 2020-01-01 RX ADMIN — ATEZOLIZUMAB 1200 MG: 1200 INJECTION, SOLUTION INTRAVENOUS at 13:45

## 2020-01-01 RX ADMIN — Medication 500 UNITS: at 15:00

## 2020-01-01 RX ADMIN — SODIUM CHLORIDE 1000 ML: 9 INJECTION, SOLUTION INTRAVENOUS at 11:06

## 2020-01-01 RX ADMIN — PROPOFOL 50 MG: 10 INJECTION, EMULSION INTRAVENOUS at 07:28

## 2020-01-01 RX ADMIN — FILGRASTIM 300 MCG: 300 INJECTION, SOLUTION INTRAVENOUS; SUBCUTANEOUS at 14:56

## 2020-01-01 RX ADMIN — OXYCODONE HYDROCHLORIDE 5 MG: 5 TABLET ORAL at 19:52

## 2020-01-01 RX ADMIN — Medication 500 UNITS: at 12:37

## 2020-01-01 RX ADMIN — ATEZOLIZUMAB 1200 MG: 1200 INJECTION, SOLUTION INTRAVENOUS at 09:48

## 2020-01-01 RX ADMIN — ATEZOLIZUMAB 1200 MG: 1200 INJECTION, SOLUTION INTRAVENOUS at 12:01

## 2020-01-01 RX ADMIN — SODIUM CHLORIDE 250 ML: 9 INJECTION, SOLUTION INTRAVENOUS at 09:18

## 2020-01-01 RX ADMIN — SODIUM CHLORIDE 250 ML: 9 INJECTION, SOLUTION INTRAVENOUS at 11:40

## 2020-01-01 RX ADMIN — ETOPOSIDE 160 MG: 20 INJECTION, SOLUTION, CONCENTRATE INTRAVENOUS at 09:17

## 2020-01-01 RX ADMIN — Medication 500 UNITS: at 12:28

## 2020-01-01 RX ADMIN — ATEZOLIZUMAB 1200 MG: 1200 INJECTION, SOLUTION INTRAVENOUS at 10:24

## 2020-01-01 ASSESSMENT — PAIN SCALES - GENERAL
PAINLEVEL: NO PAIN (0)
PAINLEVEL: NO PAIN (1)
PAINLEVEL: EXTREME PAIN (8)
PAINLEVEL: NO PAIN (0)
PAINLEVEL: MODERATE PAIN (5)
PAINLEVEL: NO PAIN (0)
PAINLEVEL: MODERATE PAIN (5)
PAINLEVEL: NO PAIN (0)
PAINLEVEL: MODERATE PAIN (5)

## 2020-01-01 ASSESSMENT — ENCOUNTER SYMPTOMS
PALPITATIONS: 0
APPETITE CHANGE: 0
VOMITING: 0
DIFFICULTY URINATING: 1
SHORTNESS OF BREATH: 0
NUMBNESS: 1
FATIGUE: 1
WOUND: 0
ABDOMINAL PAIN: 1
ACTIVITY CHANGE: 1
ARTHRALGIAS: 0
SHORTNESS OF BREATH: 0
LIGHT-HEADEDNESS: 1
AGITATION: 0
WHEEZING: 0
COUGH: 0
CHILLS: 0
ABDOMINAL PAIN: 1
DIARRHEA: 1
BRUISES/BLEEDS EASILY: 1
DYSURIA: 0
NAUSEA: 0
CHILLS: 0
DYSRHYTHMIAS: 1
HEMATURIA: 0
MYALGIAS: 0
DIZZINESS: 1
CONFUSION: 0
FEVER: 0

## 2020-01-01 ASSESSMENT — MIFFLIN-ST. JEOR
SCORE: 1471.39
SCORE: 1556.56
SCORE: 1580.04
SCORE: 1513.82
SCORE: 1526.96
SCORE: 1570.97
SCORE: 1586.64
SCORE: 1552.93
SCORE: 1482.74

## 2020-01-01 ASSESSMENT — PATIENT HEALTH QUESTIONNAIRE - PHQ9
SUM OF ALL RESPONSES TO PHQ QUESTIONS 1-9: 0
SUM OF ALL RESPONSES TO PHQ QUESTIONS 1-9: 7

## 2020-01-02 NOTE — PROGRESS NOTES
Infusion Nursing Note:  Babar Laboy presents today for day 3 of etoposide.    Patient seen by provider today: No   present during visit today: Not Applicable.    Note: patient is doing well, slight redness to cheeks and nose when completing treatment today, wife will observe for how long it last.    Intravenous Access:  Implanted Port.    Treatment Conditions:  Results reviewed, labs MET treatment parameters, ok to proceed with treatment.      Post Infusion Assessment:  Patient tolerated infusion without incident.  Blood return noted pre and post infusion.  Site patent and intact, free from redness, edema or discomfort.  No evidence of extravasations.  Access discontinued per protocol.       Discharge Plan:   Discharge instructions reviewed with: Patient and Family.  Patient and/or family verbalized understanding of discharge instructions and all questions answered.  Copy of AVS reviewed with patient and/or family.  Patient will return 18 days for next appointment.  Patient discharged in stable condition accompanied by: wife.  Departure Mode: Ambulatory.    Jean S. Hammann, RN

## 2020-01-02 NOTE — ADDENDUM NOTE
Encounter addended by: Hammann, Jean S., RN on: 1/2/2020 10:14 AM   Actions taken: Flowsheet accepted

## 2020-01-03 NOTE — PROGRESS NOTES
Spoke with Radiation Oncologist at [facility] regarding patients recent PET scan and AALIYAH which showed infiltrative tumor involving the T8 vertebral body. It was felt that patient may benefit from some radiation therapy to this area to help with pain control. Referral placed for ASAP consult with radiation therapy

## 2020-01-07 NOTE — PROGRESS NOTES
"INITIAL PATIENT ASSESSMENT    Chief Complaint   Patient presents with     Radiation Therapy       Initial /80 (BP Location: Left arm, Patient Position: Chair, Cuff Size: Adult Regular)   Pulse 60   Resp 16   Ht 1.753 m (5' 9\")   Wt 81.5 kg (179 lb 9.6 oz)   BMI 26.52 kg/m   Estimated body mass index is 26.52 kg/m  as calculated from the following:    Height as of this encounter: 1.753 m (5' 9\").    Weight as of this encounter: 81.5 kg (179 lb 9.6 oz).  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 Yes  Activity status: independent   referral needs: Not needed    Advanced Directive: No    Patient was assessed for the influenza, pneumo-poly, prevnar 13, Tdap, and shingles immunizations. \"Vaccinations and Cancer Treatment\" flyer given.  Instructed patient to discuss vaccination status with his/her PCM.    Patient was assessed using the NCCN psychosocial distress thermometer. Patient rated the score as a 2-3/10. Patient rated current stressors as \"bums a jessy out, but I'm ok\". Stressors will be brought to the attention of provider or Oncology RN Care Coordinator for a score of 6 or greater or per nurses discretion.     Pt is here today for a consult for radiation therapy for spine metastasis.  Educated patient on the mapping process and the possible side effects of XRT to the spine (vertebrae), to include: fatigue and skin reaction.  Pt verbalizes an understanding and has no questions at " this time. Pt is accompanied by his wife, Julianne, today.    Yunior Hernandez RN

## 2020-01-07 NOTE — PROGRESS NOTES
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    History of Present Illness   Mr. Laboy is a 69 year old male who presented to [facility] in early 2019 with left upper abdominal pain radiating to just below his left shoulder.  Work-up included the following imaging because the patient has a history of known ascending aortic aneurysm.    12/3/2019: CTA of the chest abdomen and pelvis:       TECHNIQUE: CTA images of the test, abdomen, and pelvis were obtained  after administration of IV contrast. Routine transaxial and  post-processed (multiplanar and/or MIP) reformations were obtained.  Noncontrast images of the chest were also obtained. Volume rendered  reconstructed images were reviewed.     FINDINGS: The aorta is normal in caliber with scattered  atherosclerotic plaque. No aortic dissection or aneurysm. Moderate  calcified and noncalcified plaque in the abdominal aorta produces  slight narrowing distally.     Branch vessels off the aortic arch are patent. In the abdomen, the  celiac, superior mesenteric, bilateral renal, and inferior mesenteric  arteries are all patent with scattered atherosclerotic plaque. The  common and external iliac arteries are patent. There is moderate  narrowing of the right internal iliac artery.     The heart is normal in size. Coronary artery calcifications are  present.     There is a left hilar mass measuring 4.3 x 4.0 cm encasing the  bronchovascular structures in the left upper  lobe and to a lesser  extent the left lower lobe. This process involves the fissure in the  left lung. There is adjacent subcarinal lymphadenopathy measuring up  to 2.4 cm in short axis dimension. There are mild groundglass  opacities peripheral to the mass in the left upper lobe/lingula.  Pulmonary artery branches are displaced by this mass but not occluded.  There is narrowing of pulmonary veins in the left upper lobe.     There are several prominent lymph nodes at the thoracic inlet,  including a right periesophageal lymph node measuring 1.2 cm. No  axillary lymphadenopathy.     There is a 1.5 cm cyst in the liver. Nonspecific 1.2 cm right adrenal  gland nodule is present. The spleen, pancreas, and left adrenal gland  are intact. The kidneys are intact. The gallbladder is present.     The bowel is normal in caliber. The appendix is normal.     No lymphadenopathy or ascites in the abdomen.     No suspicious osseous lesions.                                                                        IMPRESSION:   1. No aortic dissection or aneurysm.  2. Left hilar mass is suspicious for malignancy. Mediastinal  lymphadenopathy is suspicious for metastatic disease. Tissue sampling  recommended.        Dr. Babar Whalen at [facility] performed bronchoscopy exam December 13.  Washings, brushings, along with transbronchial and mediastinal biopsies all showed small cell carcinoma.    MRI of the brain on December 19, 2019:    Findings:                                                                        IMPRESSION: 14 separate enhancing lesions are noted within the brain,  the largest measuring 9 mm the left occipital lobe. The appearance, in  the setting of recent lung cancer diagnosis, is most compatible with  disseminated brain metastatic disease.        Patient consulted with Dr. Hamilton who recommended MRIs of the thoracic and lumbar spine, PET scan, and right IJ port placement    MRIs of the lumbar and  thoracic spine  December 31,2020:  I  Findings  Infiltrative tumor involving the T8 vertebral body extending to the  left posterior elements, left predominant paraspinal space as well as  the left anterior epidural space. Mild resultant spinal stenosis with  slight mass-effect on the left aspect of the cord. No jose  compression of the cord or associated abnormal cord signal.      Multiple subtle areas of metastatic disease are suggested within  the posterior aspects of T1-T5, the right aspect of S1, the left  posterior iliac bone and the right iliac crest. A nodule of  enhancement within the distal thecal sac at the level of S2 is more  nonspecific but may also reflect a metastatic lesion    PET/CT Scan December 27, 2020:  IMPRESSION:  5.8 cm primary left hilar small cell lung cancer  involving the upper and lower lobes. Subcarinal, right thoracic inlet,  and upper abdominal adenopathy. Metastatic right adrenal nodule.     Transosseous metastasis at T8, apparently extending into the ventral  epidural space. Correlate for any evidence of myelopathy. Recommend MR  spine with without contrast. Consider radiation therapy referral      Patient initiated chemotherapy on December 30, 2020 and received his first cycle of chemotherapy consisting of:  Carbo/etoposide/Atezolizumab for Stage IV small cell bronchogenic carcinoma of the left hilum involving both left upper and lower lobes.    Dr Hamilton requested this consultation in Radiation Oncology.  Patient is seen today and his primary complaint continues to be persistent med thoracic back pain radiating down across the left chest wall into the left upper abdomen.  Dermatomal position at approximately T8 congruent with his metastatic involvement of the T8 vertebral body with paravertebral and anterior tumor expansion.  Patient reports he thinks he has had a little bit more wheezing.  He does not use inhalers not report a sensation of shortness of breath.  He has  occasional cough but reports no hemoptysis.  Evens has been having trouble managing his constipation and diarrhea.  Evens and his wife are asking questions about the logistics of radiation therapy and have several questions regarding the side effects of radiation treatment.                                                                        Past Medical History:  Past Medical History:   Diagnosis Date     Atrial fibrillation (H)      Encounter for general adult medical examination without abnormal findings     No Comments Provided     Essential (primary) hypertension     No Comments Provided     Other microscopic hematuria     No Comments Provided     Other problems related to lifestyle     4-5 beers/day     Residual hemorrhoidal skin tags     12/05,noted on colonoscopy     Rosacea     12/1/2011     Tobacco use     No Comments Provided     Unilateral inguinal hernia without obstruction or gangrene     12/1/2011          Past Surgical History:  Past Surgical History:   Procedure Laterality Date     COLONOSCOPY      12/05,F/U 2015     COLONOSCOPY  04/25/2016 4/25/16,F/U 2021 tubular adenomas     INSERT PORT VASCULAR ACCESS Right 12/23/2019    Procedure: INSERTION, VASCULAR ACCESS PORT;  Surgeon: Nitin Quiros MD;  Location: [facility] OR     OTHER SURGICAL HISTORY      186269,OTHER     OTHER SURGICAL HISTORY      [id number],OTHER     OTHER SURGICAL HISTORY      1/29/13,,HERNIA REPAIR,Right,RIH with mesh     RELEASE CARPAL TUNNEL      2003,right          Chemotherapy History:  See HPI cycle #1 administered 12/30/2019    Radiation History:  None    Implanted Cardiac Devices:No    Medications:  Current Outpatient Medications   Medication Sig Dispense Refill     acetaminophen (TYLENOL) 500 MG tablet Take 1,000 mg by mouth every 6 hours as needed for mild pain       apixaban ANTICOAGULANT (ELIQUIS) 5 MG tablet Take 1 tablet (5 mg) by mouth 2 times daily 60 tablet 11     atenolol (TENORMIN) 50 MG tablet Take 1 tablet (50 mg)  by mouth daily Dose increase 90 tablet 3     atorvastatin (LIPITOR) 10 MG tablet Take 1 tablet (10 mg) by mouth daily 90 tablet 3     hydrochlorothiazide (HYDRODIURIL) 25 MG tablet Take 1 tablet (25 mg) by mouth daily 90 tablet 3     lidocaine-prilocaine (EMLA) 2.5-2.5 % external cream Apply to port site 1 hour prior to needlesticks 30 g 1     LORazepam (ATIVAN) 0.5 MG tablet Take 1 tablet (0.5 mg) by mouth every 4 hours as needed (Anxiety, Nausea/Vomiting or Sleep) 30 tablet 3     losartan (COZAAR) 25 MG tablet Take 1 tablet (25 mg) by mouth daily 90 tablet 3     oxyCODONE (ROXICODONE) 5 MG tablet Take 1 tablet (5 mg) by mouth every 6 hours as needed for pain (Patient taking differently: Take 5 mg by mouth every 3 hours as needed for pain ) 100 tablet 0     polyethylene glycol (MIRALAX/GLYCOLAX) packet Take 17 g by mouth daily       prochlorperazine (COMPAZINE) 10 MG tablet Take 1 tablet (10 mg) by mouth every 6 hours as needed (Nausea/Vomiting) 30 tablet 3          Allergies:     No Known Allergies     Social History:   Social History     Tobacco Use     Smoking status: Former Smoker     Packs/day: 0.50     Years: 42.00     Pack years: 21.00     Types: Cigarettes     Last attempt to quit: 12/3/2019     Years since quittin.0     Smokeless tobacco: Never Used   Substance Use Topics     Alcohol use: Not Currently     Comment: quit 2 weeks ago     Drug use: No          Family History:  Family History   Problem Relation Age of Onset     Hypertension Father      Heart Disease Father      Myocardial Infarction Father      Cerebrovascular Disease Father      Hypertension Other         Hypertension,Multiple family members     Pancreatic Cancer Brother      Colon Cancer No family hx of      Prostate Cancer No family hx of           Review of Systems   A 10-point review of systems was performed. Pertinent findings are noted in the HPI.    Physical Exam   ECOG Status: 80    Vitals:  /80 (BP Location: Left arm,  "Patient Position: Chair, Cuff Size: Adult Regular)   Pulse 60   Resp 16   Ht 1.753 m (5' 9\")   Wt 81.5 kg (179 lb 9.6 oz)   BMI 26.52 kg/m      Pain: 4/10    Physical Exam   Constitutional:  Patient is alert oriented and appropriate well-developed well-nourished late middle-aged male in no acute distress.  No evidence of premature or advanced chronologic age.    Neck:  No evidence of tender or enlarged lymph nodes, neck abnormalities or restricted range of motion.    Chest:  Patient has tenderness across the left posterior chest and dorsal spine in the mid thoracic region no evidence of tender or enlarged lymph nodes.    Respiratory:  No evidence of abnormal breath sounds with the exception of slight expiratory wheezing on the left upper lung fields, chest abnormalities on palpation or on percussion.    Back/Spine:  Reduced flexibility and percussible tenderness in the mid thorax    Musculoskeletal:  No evidence of bone or joint abnormalities.  No compromised muscle tone or restricted range of motion.    Neurologic:  No evidence of impaired cranial nerves, uncoordinated gait or motor impairment.  He has no loss of motor strength in all major muscle groups.  He has no sensory deficit noted across the trunk or lower extremities.  Station and gait are normal.  Psychiatric:  No evidence of altered affect, lack of comprehension and disorientation.    Imaging/Path/Labs   Imaging: See HPI    Path: See HPI    Labs: Mild hyponatremia with a sodium in the 120 range    Assessment    Mr. Laboy is a 69 year old male with new diagnosis of Extensive Stage IV small cell carcinoma of the left hilum with extension into the left upper and left lower lobes.  Symptomatic complaint at this time is mid back pain radiating to his left anterior chest and abdomen, resulting from metastatic bony involvement of T8 vertebral body, extension of tumor in the paralateral and anterior space around the vertebral body.  There is no evidence of " spinal cord compression or altered neurologica function.    I spent approximately 60 minutes with the patient and his wife explaining the rationale, risks, and side effects of palliative external beam radiation therapy to treat his bone metastasis at T8 and the primary tumor mass in the left Hilum.  I reviewed his CT and MRI scans are one of our diagnostic radiologist, and found the left hilar tumor mass is so close in line with the T8 vertebral body, that I recommend that we include treatment to the left hilar mass.   This is intended to prevent progression and possible blockage of his major airways in the left lung.    We discussed management of his constipation by using colace more often during the day and possibly reserving the laxatives at night if needed.     I explained the expected side effects of normal lung damage, swallowing discomfort, patient understands he may have long-term side effects of radiation which could involve eventually requiring oxygen supplementation.  Patient understands  that if he does develop swallowing discomfort we have effective management with OTC  and prescription medications.  Patient also understands treatment will consist of 3 weeks  (15 treatment fractions) with intention to deliver a total dose of 3600 cGy  to the vertebral body and to the left hilar tumor mass.  This be delivered using 3D conformal radiotherapy treatment planning and delivery.    Rationale and Medical Necessity of 3D Conformal Radiotherapy Note:  Treatment planning has been completed for the patient's neoplasm.  The CT data set obtained at simulation was used to generate target volumes and delineate organs at risk which include heart, lungs, spinal cord and esophagus.    The isodose distribution was reviewed in 3 dimensions and a DVH including the above critical structures was reviewed.  3D plan provided coverage of the target volume while achieving acceptable sparing of adjacent normal tissues.  Planning  objectives for the above critical structures were established prior to treatment planning in accordance with the departmental standards.  Please refer to the final physician approved plan for details regarding the number of beams and angles utilized.    3D conformal radiotherapy was used because it allowed improved target coverage and approved normal tissue sparing versus simpler planning techniques.    I Recommended to  and Mrs. Labyo that we normally do not do CT simulations on the same day as a patient's first consultation, however we need to make an exception to that rule  because his tumor mass is encroaching on his spinal cord at T8 and is causing him tremendous pain. The tumor could significantly progress and if we wait five more days before he starts his treatment we would increase the risk that he could have permanent spinal cord damage.     I do not want to wait until Monday 1/13/20 to start his treatments, it's too risky. We will need to simulate him today 1/7/20, in order to start him on January 9th, which is this coming Thursday. We will be able to get 2 treatments in before the weekend which will be beneficial for this patient to relieve his pain and do everything we can to avoid a cord compression.  It is customary to not start patients on Fridays because one treatment before a two day weekend break is wasting the first treatment. RT is best given at least two days before any break in treatment.       Wishes to proceed with the recommended therapy and signed informed consents.  Patient underwent CT simulation for his diagnosis of age for small cell bronchogenic carcinoma of the left hilum with metastasis into the T8 vertebral body.    Radiation Therapy Simulation Note for Lung Cancer and Thoracic Spine Bone Metastases  Procedure: Babar Laboy was brought to the simulation suite, placed in a modified supine position.  Careful alignment was accomplished using orthogonal lasers.  Positioning aided  with the use of a custom fabricated Vac-John bag; with arms in the overhead position.  CT imaging was acquired through the thorax and isocenter placed.  All imaging will be used for 3D conformal treatment planning for treatment of lung cancer with bone metastasis in the setting of stage IV extensive stage IV disease with adequate dose/volume relationships characterized for target, body, heart, lung, and esophagus.    For the majority of time that I spent face-to-face with the patient and his wife, I explained important clinical data, provided education, and outlining his treatment care plan.  Patient understands I am a temporary low comes radiation oncologist physician and that his care will be transferred to Dr. Bala Toribio after I leave.  Evens and his wife are comfortable with that plan.    All the patient's questions were answered to his satisfaction.  Patient and his wife understand they can contact us at anytime with questions or concerns.  Goal is to start palliative treatment on Thursday January 9, 2020.  This will be delivered with concurrent chemotherapy under the care of Dr. Hamilton.  Thank you for asking us to participate in the care of Mr. Evens Laboy      Plan   Patient agrees to return in 2 days to initiate palliative radiotherapy to the thoracic spine and primary tumor mass.    Malcolm Mcfadden M.D.  [facility],    Radiation Oncology [facility]

## 2020-01-07 NOTE — PROGRESS NOTES
Patient simulation completed today for Babar Laboy 01/07/20.    Le Henley  January 7, 2020  1:52 PM

## 2020-01-07 NOTE — PROGRESS NOTES
"[facility]  Nutrition Assessment    Babar Laboy    1950 Jan 7, 2020    Diagnosis: lung cancer w/ bone mets    Height: 5' 9\" Weight: 179 lbs 9.6 oz    Usual Weight: 180 Weight Change: 0      Past Medical History:   Diagnosis Date     Atrial fibrillation (H)      Encounter for general adult medical examination without abnormal findings     No Comments Provided     Essential (primary) hypertension     No Comments Provided     Other microscopic hematuria     No Comments Provided     Other problems related to lifestyle     4-5 beers/day     Residual hemorrhoidal skin tags     12/05,noted on colonoscopy     Rosacea     12/1/2011     Tobacco use     No Comments Provided     Unilateral inguinal hernia without obstruction or gangrene     12/1/2011       1. Receiving Chemotherapy? Yes  - 1 point  2. Weight Loss per Month (in pounds) Based on Usual Weight: 0    100# 100-120# 120-150# 150-200# >200# Pt. System   > 5 5 - 6 6 - 8 7 - 10 > 10 1 Point   > 7 7- 9 9 - 11 11 - 14 > 15 2 Points   > 10 10 - 12 12 - 15 15 - 20 > 20 3 Points       3. Eating Problems: ( 1 Point for Each Problem)       Loss of Appetite     No - 0 points    Difficulty Swallowing    No - 0 points   Nausea    No - 0 points    Early Satiety    No - 0 points   Vomiting    No - 0 points    Taste/Smell Aversions  No - 0 points    Diarrhea    No - 0 points    Esophageal Reflux   No - 0 points   Mouth Soreness/Difficulty Chewing  No - 0 points          Total: 0    4.  Automatic Referral for the Following Diagnosis or Situations: n/a     Comments: n/a    Total Score: 1 (Scores >/= 4 will receive a Dietician Consult)    Yunior Hernandez RN  "

## 2020-01-10 NOTE — PROGRESS NOTES
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  Plan is to treat the patient with palliative radiotherapy to the T8 vertebral body region for his bone metastasis and to the left hilar tumor mass all in one treatment field.    Procedure:  The above patient was here for a CT simulation; the general CT procedure was previously described in the Radiation Oncology Consultation note dated January 7, 2020.  To assess tumor and organ at risk motion, a 4D simulation was performed.  Axial tomographic images were acquired in concert with a respiratory waveform, and the data set was binned into 10 separate phases, creating 10 separate CT scans at distinct phases of the respiratory cycle.  The dataset was reviewed at the time of simulation for adequacy by radiation oncologist.  A maximal intensity projection (MIP), and average intensity projection were created from the data set. The 4D data set was transferred to the treatment planning system for further contouring and planning.    Malcolm Mcfadden M.D.  [facility]'s Radiation Oncologist  [facility]    "

## 2020-01-13 NOTE — NURSING NOTE
"Chief Complaint   Patient presents with     Follow Up     CPAP       Initial /80 (BP Location: Right arm, Patient Position: Sitting, Cuff Size: Adult Regular)   Pulse 58   Resp 16   Ht 5' 9\" (1.753 m)   Wt 177 lb 9.6 oz (80.6 kg)   SpO2 97%   BMI 26.23 kg/m   Estimated body mass index is 26.23 kg/m  as calculated from the following:    Height as of this encounter: 5' 9\" (1.753 m).    Weight as of this encounter: 177 lb 9.6 oz (80.6 kg).  Medication Reconciliation: complete    Gianna Jauregui LPN on 1/13/2020 at 11:50 AM    "

## 2020-01-13 NOTE — PROGRESS NOTES
Sleep Medicine Progress Note  Babar Laboy  January 13, 2020  [id number]    Chief Complaint: mild NESSA on CPAP    History of Present Illness: Babar Laboy is a 69 year old male presenting for above complaint.  He has a history of at least mild obstructive sleep apnea diagnosed on a home sleep apnea test.  He is wearing his CPAP almost every night.  In the last 30 nights he has worn his CPAP 28 out of 30 nights and averages 6 hours 19 minutes.  He is meeting Medicare compliance requirements wearing it 87% of the nights more than 4 hours.  In auto titrate mode 5 to 20 cm water pressure is 95th percentile pressure is 9 cm water pressure.  He is not having significant leak.  His wife notes that he used to snore a lot.  He is now doing better and that he is not snoring.  He seems to sleep longer and awakens rested.  They are finding it beneficial.  Since I saw him last he has been diagnosed with lung cancer and is receiving chemotherapy and radiation therapy.  Review of his download shows that he is having 9-10 events per hour with 7.3 apneas per hour that are central.  He is not aware of these.  He scores 4 on the [place] Sleepiness Scale.  There was concern for untreated sleep apnea because of atrial fibrillation with a normal LVEF.      Past Medical History:  Past Medical History:   Diagnosis Date     Atrial fibrillation (H)      Encounter for general adult medical examination without abnormal findings     No Comments Provided     Essential (primary) hypertension     No Comments Provided     Other microscopic hematuria     No Comments Provided     Other problems related to lifestyle     4-5 beers/day     Residual hemorrhoidal skin tags     12/05,noted on colonoscopy     Rosacea     12/1/2011     Tobacco use     No Comments Provided     Unilateral inguinal hernia without obstruction or gangrene     12/1/2011       Medications:  Current Outpatient Medications   Medication     acetaminophen (TYLENOL) 500 MG tablet      "apixaban ANTICOAGULANT (ELIQUIS) 5 MG tablet     atenolol (TENORMIN) 50 MG tablet     atorvastatin (LIPITOR) 10 MG tablet     docusate sodium (COLACE) 100 MG capsule     hydrochlorothiazide (HYDRODIURIL) 25 MG tablet     lidocaine-prilocaine (EMLA) 2.5-2.5 % external cream     losartan (COZAAR) 25 MG tablet     prochlorperazine (COMPAZINE) 10 MG tablet     LORazepam (ATIVAN) 0.5 MG tablet     oxyCODONE (ROXICODONE) 5 MG tablet     polyethylene glycol (MIRALAX/GLYCOLAX) packet     No current facility-administered medications for this visit.        Physical Exam:  /80 (BP Location: Right arm, Patient Position: Sitting, Cuff Size: Adult Regular)   Pulse 58   Resp 16   Ht 5' 9\" (1.753 m)   Wt 177 lb 9.6 oz (80.6 kg)   SpO2 97%   BMI 26.23 kg/m    Limited to vitals    Assessment and Plan:  69 year old male presenting for mild sleep apnea.  He is compliant with CPAP.  He is benefiting from CPAP however he is having some central apneas.  I discussed with him and his wife that central apneas oftentimes improve as the patient acclimates to CPAP.  With him benefiting and meeting compliance the plan is to continue CPAP.  Sometimes excessive pressure triggers central apneas and I am going to have his DME set his machine to 6 to 9 cm water pressure.  I will have the expiratory pressure relief set to ramp only or turned off as sometimes this triggers central apneas.  I recommended a download in 1 to 2 weeks to see how he is doing.  Plan will be for pulmonary sleep reassessment in clinic in 3 months.    CC: Debra Patton [place]      "

## 2020-01-15 NOTE — PROGRESS NOTES
RADIATION ONCOLOGY PROGRESS NOTE     Name: Babar Laboy MRN: [id number]   : 1950   Date of Service: 1/15/2020  Referring: No ref. provider found         Patient Care Team       Relationship Specialty Notifications Start End    Mario Grady MD PCP - General Pediatrics  19     Phone: [phone] Fax: [phone]         [address] [place] [address]    Mario Grady MD Assigned PCP   19     Phone: [phone] Fax: [phone]         [address] [place] [address]    Jeff Hamilton MD MD Hematology & Oncology  20     Phone: [phone] Fax: [phone] [address] [place] [address]            DIAGNOSIS:    Malignant neoplasm of lung, unspecified laterality, unspecified part of lung (H)  Bone metastasis (H)     HPI: Mr. Laboy is a 69 year old male with Stage IV lung cancer bone metastasis at T8.      RADIATION THERAPY:    Babar Laboy initiated left hilar and T8 radiation therapy on 2020. He has received 1200Gy to the mass and thoracic spine,  5 out of 15 treatment fractions.      SUBJECTIVE:    He is currently much better with his breathing significantly easier.  Also, patient reports after the second or third treatment his back pain went away.  He stopped taking his pain medicine approximately 1 week ago.  Patient reporting no unexpected side effects      OBJECTIVE:    /70 (BP Location: Left arm, Patient Position: Chair, Cuff Size: Adult Regular)   Pulse 56   Resp 16   Wt 78.9 kg (174 lb)   BMI 25.70 kg/m    Examination reveals early  male in no acute distress.  Smiles he is quite upbeat today and glad to see he is making some good progress with his small cell bronchogenic carcinoma therapy        IMPRESSION:  Routine tolerance to radiation therapy.  Significant improvement in his pain and respiratory function      PLAN:  Continue treatment as planned .      Thank you for asking us to  participate in the care of Leonie Babar Laboy.    Malcolm Mcfadden MD  Radiation Oncology

## 2020-01-15 NOTE — PROGRESS NOTES
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  Plan is to treat the patient with palliative radiotherapy to the T8 vertebral body region for his bone metastasis and to the left hilar tumor mass all in one treatment field.    Procedure:  The above patient was here for a CT simulation; the general CT procedure was previously described in the Radiation Oncology Consultation note dated January 7, 2020.  To assess tumor and organ at risk motion, a 4D simulation was performed.  Axial tomographic images were acquired in concert with a respiratory waveform, and the data set was binned into 10 separate phases, creating 10 separate CT scans at distinct phases of the respiratory cycle.  The dataset was reviewed at the time of simulation for adequacy by radiation oncologist.  A maximal intensity projection (MIP), and average intensity projection were created from the data set. The 4D data set was transferred to the treatment planning system for further contouring and planning.    Malcolm Mcfadden M.D.  [facility]'s Radiation Oncologist  [facility]    "

## 2020-01-16 NOTE — TELEPHONE ENCOUNTER
Radiation today wanted patient to call Dr Hamilton and make him aware that patient has had tingling in his hands for a month.

## 2020-01-16 NOTE — TELEPHONE ENCOUNTER
Will start vitamin B6 100 mg daily. Will discuss further with Jeff Hamilton MD 1/22.  Zaria Man RN...........1/16/2020 2:03 PM

## 2020-01-17 NOTE — TELEPHONE ENCOUNTER
[facility] did not have the RX for the vitamin B6 so the Pharmacist sold them an OTC and told them it was the same thing.  Julianne just wants to confirm with Nurse that this is OK.

## 2020-01-17 NOTE — TELEPHONE ENCOUNTER
Returned call to Julianne and verified with her that it is ok for pt to take the OTC Vitamin B6. She verbalized her understanding. Quinton Sales RN, BSN  ....................  1/17/2020   9:09 AM

## 2020-01-20 NOTE — PROGRESS NOTES
Patient here for cycle 2 chemotherapy. He has complaints of worsening neuropathy in his hands after just one cycle of chemotherapy. Patient states he is having a hard time buttoning his shirts and holding on to things. He denies neuropathy in his feet. Spoke with Dr Hamilton who would like to hold chemotherapy for this week and start patient on gabapentin 300mg tid. Will reassess patient next week before any further chemotherapy is given. Prescription for gabapentin sent to pharmacy. Patient and wife were encouraged to call with any new questions or concerns.

## 2020-01-20 NOTE — PROGRESS NOTES
Infusion Nursing Note:  Babar Laboy presents today for Cycle 2 day 1.    Patient seen by provider today: Yes: Darshan   present during visit today: Not Applicable.    Note: patient having neuropathy in both hands, addressed by MARIKA Sexton and treatment held today.    Intravenous Access:  Labs drawn without difficulty.  Implanted Port.    Treatment Conditions:  Lab Results   Component Value Date    HGB 12.7 01/20/2020     Lab Results   Component Value Date    WBC 3.4 01/20/2020      Lab Results   Component Value Date    ANEU 1.9 01/20/2020     Lab Results   Component Value Date     01/20/2020      Lab Results   Component Value Date     01/20/2020                   Lab Results   Component Value Date    POTASSIUM 3.7 01/20/2020           No results found for: MAG         Lab Results   Component Value Date    CR 0.71 01/20/2020                   Lab Results   Component Value Date    DONELL 9.2 01/20/2020                Lab Results   Component Value Date    BILITOTAL 0.4 01/20/2020           Lab Results   Component Value Date    ALBUMIN 4.1 01/20/2020                    Lab Results   Component Value Date    ALT 32 01/20/2020           Lab Results   Component Value Date    AST 18 01/20/2020       Results reviewed, labs did NOT meet treatment parameters: labs within range but neuropathy is a concern.      Post Infusion Assessment:  Blood return noted pre and post infusion.  Site patent and intact, free from redness, edema or discomfort.  No evidence of extravasations.  Access discontinued per protocol.       Discharge Plan:   Discharge instructions reviewed with: Patient and wife.  Patient and/or family verbalized understanding of discharge instructions and all questions answered.  Patient discharged in stable condition accompanied by: wife.  Departure Mode: Ambulatory returns next week for evaluation declines AVS.    Jean S. Hammann, RN

## 2020-01-22 NOTE — PROGRESS NOTES
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  Plan is to treat the patient with palliative radiotherapy to the T8 vertebral body region for his bone metastasis and to the left hilar tumor mass all in one treatment field.    RADIATION THERAPY:  The patient has received 2400 cGy in 10 treatments out of a planned 15 treatments     SUBJECTIVE: He resolution of pain. He has no new complaints.    OBJECTIVE:  Weight: 177 lbs 12.8 oz pounds.  Examination unchanged. Weight stable.     IMPRESSION:  Tolerating well with resolution of pain    PLAN:  Continue treatment as planned.           Bala Toribio MD    CC: REFERRING PROVIDERS          "

## 2020-01-23 NOTE — PROGRESS NOTES
Visit Date:   01/22/2020      HISTORY OF PRESENT ILLNESS:  Mr. Laboy returns for followup of extensive stage small cell lung carcinoma with brain metastases.  We had seen the patient in consultation at the request of Dr. Whalen and Dr. Grady back on 12/20/2019.  At that time, Mr. Laboy was a 69-year-old white male with a history of tobacco abuse, obstructive sleep apnea, atrial fibrillation, we were asked to evaluate concerning new diagnosis of extensive stage small cell lung carcinoma with brain metastases.  Apparently, he had presented to the emergency room on 12/03 with complaints of left-sided abdominal/flank pain that radiated to his left shoulder blade.  A CT scan of the chest was performed and revealed a 4.3 cm large left hilar mass with extrinsic compression of the lingular subsegment along with a large subcarinal lymph node.  The patient subsequently referred to Dr. Nixon Bowman at [facility] who saw the patient on 12/12 who recommended bronchoscopy with EBUS and under general anesthesia.  This was performed by Dr. Whalen who was his partner, who performed a bronchoscopy with transbronchial biopsy 12/13.  Pathology revealed the patient had a transbronchial biopsy that was consistent with small cell lung carcinoma.  Mediastinal mass was also positive for small cell carcinoma.  Washings were suspicious for small cell carcinoma as well as transbronchial biopsy of mediastinal mass.  This was all suspicious for small cell carcinoma.  The patient was scheduled to have a PET scan and MRI of the brain was performed this week and the findings were 14 separate enhancing lesions noted in the brain, largest measuring 9 mm in the left occipital lobe.  The appearance in the setting of recent lung cancer diagnosis most compatible with asymptomatic brain metastatic disease.  The patient was seen by us on 12/20, we felt given the fact he had extensive stage small cell lung cancer with asymptomatic brain mets,  that he would require carboplatin AUC of 5 given on day 1, atezolizumab 1200 mg IV given on day 1, etoposide 100 mg/m2 on days 1, 2 and 3, with a 21-day cycle.  The patient had a PET scan on 12/27, which revealed a 5.8 cm primary, left hilar small cell lung cancer involving the upper and lower lobes, subcarinal right thoracic inlet, upper abdominal adenopathy with metastatic right adrenal nodule.  There was transosseous metastases T8 apparently extending into the ventral epidural space.  MRI of the spine was recommended.  The patient underwent MRI of the axial spine which revealed an infiltrative tumor involving the T8 vertebral body extending to the left posterior elements of the left predominantly paraspinal space as well as the left anterior epidural space.  There were mild results of spinal stenosis, slight mass effect on the left aspect of the cord.  No jose compression of the cord associated abnormal cord signal.  There were multiple small subtle areas of metastatic disease suggested in the posterior aspect of T1-T5, right aspect of S1 on the left posterior iliac bone, and the right iliac crest.  The nodular enhancement within the distal thecal sac at the level S2 was more nonspecific, but may also reflect a metastatic lesion.  The patient went on to receive cycle 1 of chemotherapy on 12/30/2019.  He was having ongoing back pain and was referred to Dr. Santiago of Radiation Oncology who saw the patient subsequently.  We felt the patient had symptoms related to T8 vertebral body metastases with extension of tumor in the parallel anterior space from vertebral body.  He also felt the patient would benefit from radiation to the primary tumor mass in the left hilum.  Therefore, we recommended 3 weeks of treatment, 15 treatments fractions with attention to deliver a total of 3600 cGy to the vertebral body and to the left hilar tumor mass.  The patient otherwise continues with radiation.  He has 5 more treatments to  go.  Says the pain has improved considerably and his back.  He is not requiring any pain meds.  He was due for cycle 2 of carboplatin, etoposide, and atezolizumab on 01/20, but apparently he came in with complaints of not being able to button his shirt and he was dropping objects including a coffee cup.  He did not have any symptoms of pain in his feet.  He was seen by Darlin Sexton, and we decided to hold chemotherapy and was prescribed gabapentin 300 mg p.o. t.i.d. and use Vitamin B6 100 mg daily.  He says he comes in today, he is feeling much better.  He is not dropping things pain and numbness in his hands have improved.  He asked about glutamine, which apparently is mildly effective in neuropathy.  Otherwise, we have discussed that we likely would resume chemotherapy next week with a 20% dose reduction of etoposide, otherwise offers no complaints of fevers, night sweats, weight loss, abdominal pain.  He says he was an alcoholic in the past and may have had neuropathy in the past as well, but otherwise this apparently has been exacerbated by the etoposide.      PHYSICAL EXAMINATION:   GENERAL:  He is a middle-aged white male in no acute distress.   VITAL SIGNS:  Blood pressure 160/76, pulse 62, respirations 16, temperature 97.9.   HEENT:  Atraumatic, normocephalic.  Oropharynx nonerythematous.   NECK:  Supple.   LUNGS:  Reveal few rhonchi on the right.   HEART:  Regular rhythm, S1, S2 is normal.   ABDOMEN:  Soft, normoactive bowel sounds.  No mass, nontender.   LYMPHATICS:  No cervical, supraclavicular, axillary or inguinal nodes.   EXTREMITIES:  No edema.   NEUROLOGIC:  Nonfocal.      LABORATORY DATA:  Laboratories reveal CBC:  White count 3.4 with ANC of 1.9, H and H 12.7 and 35.4, platelet count is 219,000.  BUN is 16, creatinine 0.7.  LFTs are normal.      IMPRESSION:  Extensive stage small cell lung carcinoma with asymptomatic brain mets.  The patient was deemed a candidate for extensive stage disease  with carboplatin, etoposide and atezolizumab.  The plan was to proceed with 4 cycles, then restage with MRI of the brain as well as PET scan.  In the interim, the patient had a PET scan which indicated disease that was present, a 5.8 cm primary, left hilar small cell lung cancer as well as subcarinal and right thoracic inlet and upper abdominal lymphadenopathy and metastatic right adrenal nodule as well as T8 metastases.  The patient was seen by Radiation Oncology and was deemed a candidate for palliative radiation to T8, as well as the primary left hilar mass.  He is currently receiving his radiation therapy.  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  We had prescribed gabapentin 300 mg p.o. t.i.d. as well as B6 100 mg daily.  Glutamine may benefit at a dose of 500 mg daily.  The patient would like to use CBD oil which is reasonable.  Otherwise, we will dose reduce etoposide by 20% and proceed with cycle 2 next week.  Otherwise, we will see the patient cycle 3 at that time we will order scans.      Forty minutes was spent with patient, greater than half the time was spent in counseling and coordination of care.         ALANA NANCE MD             D: 2020   T: 2020   MT: FRANCOISE      Name:     NINA GODINEZ   MRN:      [id number]        Account:      [id number]   :      1950           Visit Date:   2020      Document: [id number]       [facility] Janell Cates MD

## 2020-01-27 NOTE — PROGRESS NOTES
Infusion Nursing Note:  Babar Laboy presents today for Tecentriq/carboplatin/etoposide cycle 2 day 1.    Patient seen by provider today: No   present during visit today: Not Applicable.    Note: patient's neuropathy is getting a little better but still bothersome, received decreased dose today of etoposide. Call placed to confirm if port could be left accessed for radiation later today and they stated it was ok to leave accessed.    Intravenous Access:  Labs drawn without difficulty.  Implanted Port.    Treatment Conditions:  Lab Results   Component Value Date    HGB 12.9 01/27/2020     Lab Results   Component Value Date    WBC 6.1 01/27/2020      Lab Results   Component Value Date    ANEU 4.1 01/27/2020     Lab Results   Component Value Date     01/27/2020      Results reviewed, labs MET treatment parameters, ok to proceed with treatment.      Post Infusion Assessment:  Patient tolerated infusion without incident.  Blood return noted pre and post infusion.  Site patent and intact, free from redness, edema or discomfort.  No evidence of extravasations.  Access left for treatment tomorrow.      Discharge Plan:   Discharge instructions reviewed with: Patient and Family.  Patient and/or family verbalized understanding of discharge instructions and all questions answered.  Copy of AVS reviewed with patient and/or family.  Patient will return tomorrow for next appointment.  Patient discharged in stable condition accompanied by: son.  Departure Mode: Ambulatory.    Jean S. Hammann, RN

## 2020-01-27 NOTE — PROGRESS NOTES
"RADIATION ONCOLOGY PROGRESS NOTE     Name: Babar Laboy MRN: [id number]   : 1950   Date of Service: 2020  Referring: No ref. provider found         Patient Care Team       Relationship Specialty Notifications Start End    Mario Grady MD PCP - General Pediatrics  19     Phone: [phone] Fax: [phone]         [address] [place] [address]    Mario Grady MD Assigned PCP   19     Phone: [phone] Fax: [phone] [address] [place] [address]    Jeff Hamilton MD MD Hematology & Oncology  20     Phone: [phone] Fax: [phone] [email or web address] [place] [address]            DIAGNOSIS: Small cell lung cancer of the left hilum with bone metastases (Stage IV (cT4, cN3, cM1)    CHIEF COMPLAINT: Proximal esophagitis (radiation therapy), bone pain (metastatic disease)    HPI: Mr. Laboy is a 69 year old male with metastatic small cell lung cancer who is receiving palliation radiation therapy for symptomatic disease involving the bone and central airway.       RADIATION THERAPY:    Babar Laboy initiated palliative radiation therapy on 2020. He has received 3,120 cGy to the thorax, 13 out of 15 treatment fractions.      SUBJECTIVE:    He is complains on continued odynophagia of the proximal esophagus. The pain begins in the morning with his second cup of coffee (not excessively hot). The pain also occurs during the day when drinking water but does not limit his ability to eat solids such as chips and meats. He stopped taking pain medications upon near complete resolution of his bony pain several days after starting radiation therapy to that region. He denies constant pain or \"razor blade\" pain. He denies white plaques of the pharyngeal region or tongue. He denies fevers or chills. He is able complete his usual daily activities better than at the start of radiation therapy.       OBJECTIVE:    /70 " (BP Location: Left arm, Patient Position: Chair, Cuff Size: Adult Regular)   Pulse 64   Resp 16   Wt 81.7 kg (180 lb 1.6 oz)   BMI 26.58 kg/m    Examination reveals a slightly elderly gentleman who appears well developed, well nourished, and in no acute distress. KPS 70.  ENT: Mucous membranes moist, no thrush. Lungs have minimal crackles on the right, easy respirations. Heart is regular rate and rhythm, S1-S2.  Abdomen is bowel sounds positive, soft, nondistended, nontender.  Neurologic is intact with no ataxia or gross focal weakness.  Psychologic is alert, oriented, fluent, insightful, concrete, and without perseveration or redirection..      IMPRESSION:  Mild treatment-related mucosal toxicity of the upper esophageal region near or in the field of treatment. Thrush less likely based upon nature of complaint. Reviewed the potential role of Magic Mouthwash for palliation of the pain. Reviewed the relative risks, benefits, rationale, use, and potential toxicity of the agent as well as alternatives. Patient wished to proceed as recommended with radiation therapy and Magic Mouthwash.      PLAN:  Continue radiation treatment as planned. Prescription given for compounded Magic Mouthwash.       Thank you for asking us to participate in the care of Mr. Babar Laboy.    Duy Kelly MD  Radiation Oncology

## 2020-01-28 NOTE — PROGRESS NOTES
Infusion Nursing Note:  Babar Laboy presents today for day 2 Etoposide.    Patient seen by provider today: No   present during visit today: Not Applicable.    Note: N/A.    Intravenous Access:  Implanted Port.    Treatment Conditions:  Not Applicable.      Post Infusion Assessment:  Patient tolerated infusion without incident.  Blood return noted pre and post infusion.  Site patent and intact, free from redness, edema or discomfort.  No evidence of extravasations.       Discharge Plan:   Patient and/or family verbalized understanding of discharge instructions and all questions answered.  Patient discharged in stable condition accompanied by: son.  Departure Mode: Ambulatory returns tomorrow for day 3.    Jean S. Hammann, RN

## 2020-01-29 NOTE — PROGRESS NOTES
Infusion Nursing Note:  Babar Laboy presents today for day 3 of Etoposide.    Patient seen by provider today: No   present during visit today: Not Applicable.    Note: while here patient had low blood pressures extra fluids were given and instructions given to check blood pressure before taking his medications in the morning and if systolic is less than 100 hold and try to take in extra fluids, keep track of how often holding and talk with primary, patient did not want more done today had appointment with radiation.    Intravenous Access:  Implanted Port.    Treatment Conditions:  Lab Results   Component Value Date    HGB 12.9 01/27/2020     Lab Results   Component Value Date    WBC 6.1 01/27/2020      Lab Results   Component Value Date    ANEU 4.1 01/27/2020     Lab Results   Component Value Date     01/27/2020          Post Infusion Assessment:  Patient tolerated infusion without incident.  Blood return noted pre and post infusion.  Site patent and intact, free from redness, edema or discomfort.  No evidence of extravasations.  Access discontinued per protocol.       Discharge Plan:   Discharge instructions reviewed with: Patient and Family.  Patient and/or family verbalized understanding of discharge instructions and all questions answered.  Patient discharged in stable condition accompanied by: sister CIPRIANO given day one of treatment returns in 18 days.    Jean S. Hammann, RN

## 2020-01-30 PROBLEM — C34.02 MALIGNANT NEOPLASM OF HILUS OF LEFT LUNG (H): Status: ACTIVE | Noted: 2019-01-01

## 2020-01-30 NOTE — PROGRESS NOTES
RADIATION THERAPY TREATMENT SUMMARY      Name: Babar Laboy MRN: [id number]  : 1950   Date of Service: 2020  Referring: Dr. 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      TREATMENT INTENT:  Palliative.      REGION TREATED:  Single field encompassing the T8 vertebral bone metastasis (pain) and the left hilar region mass (airway compromise) in one treatment field.    PRIMARY TREATMENT TECHNIQUE:  3-D conformal with 10-MV photons      COMMENTS: Today, this very pleasant 69-year-old gentleman completed palliative radiation therapy to the T8 and left hilar region for palliation of symptomatic metastases from that of small cell lung cancer.  By this disease, the patient easily remains functionally independent had enjoyed a good quality of life until the onset of the recent T8 pain.  The consensus recommendation of the multidisciplinary oncology team was concurrent chemoradiation with palliative intent.  While medical oncology administered etoposide, treated the patient as described above.  The patient tolerated well the large field of radiation therapy.  The back pain improved within days of starting radiation therapy.  He developed mild esophagitis managed with Magic Mouthwash.  He remained neurologically intact.    PLAN: The patient will continue Magic Mouthwash as needed for symptomatic management of esophagitis.  The discomfort from the chemoradiation -ssociated esophagitis may increase over the next few days and then should rapidly improve.  He has an appointment to follow-up with medical oncology.  The patient can therefore follow-up with us as needed.  He agreed to see his primary care physician for general and maintenance health issues.  We answered all questions to his satisfaction.  Thank you for allowing me to participate in the care of this very pleasant patient.          Duy Kelly MD, PhD

## 2020-02-05 NOTE — TELEPHONE ENCOUNTER
Wife called stating Lu is experiencing worsening esophagitis.  He is taking Magic Mouthwash TID.  Spoke with Dr. Kelly and he stated he can take Oxycodone 5 mg Q6H PRN and Tylenol as needed.  Also, take Magic Mouthwash four times a day and to not eat or drink anything 5-10 minutes after.  He is able to swallow pills, drinking liquids, and is eating soft foods.  Encouraged to keep doing this and to try a nutrition shake. He will follow-up with Dr. Hamilton next week.  They will call us with any questions or concerns. Wife verbalized an understanding.     Savana Kelly RN

## 2020-02-07 NOTE — PROGRESS NOTES
Infusion Nursing Note:  Babar Laboy presents today for fluids and potassium replacement.    Patient seen by provider today: Yes: Provatas     present during visit today: Not Applicable.    Note: N/A.    Intravenous Access:  Labs drawn without difficulty.  Implanted Port.    Treatment Conditions:  Not Applicable.      Post Infusion Assessment:  Patient tolerated infusion without incident.  Blood return noted pre and post infusion.  Site patent and intact, free from redness, edema or discomfort.  No evidence of extravasations.  Access discontinued per protocol.       Discharge Plan:   Discharge instructions reviewed with: Patient and Family.  Patient and/or family verbalized understanding of discharge instructions and all questions answered.  Patient discharged in stable condition accompanied by: wife.  Departure Mode: Ambulatory.    Jean S. Hammann, RN

## 2020-02-07 NOTE — PROGRESS NOTES
Visit Date:   02/07/2020      HEMATOLOGY/ONCOLOGY CLINIC NOTE      HISTORY OF PRESENT ILLNESS:  Mr. Laboy is seen on an emergent basis, is currently being treated for extensive stage small cell lung carcinoma with brain metastases.  He has received 2 cycles of chemotherapy thus far.  The patient has completed radiation therapy, palliative to T8 as well as the left hilar region and received a total of 3600 cGy over 20 elapsed days.  He completed this on 01/29.  The patient was found to have esophagitis and was placed on Magic Mouthwash and oxycodone.  Apparently, he had called Dr. Kelly, Radiation Oncologist, with complaints of worsening esophagitis.  He recommended taking oxycodone 5 mg q.6 hours p.r.n. and Tylenol as needed, also Magic Mouthwash 4 times a day.        The patient comes in today with worsening dysphagia and odynophagia.  He says he is primarily drinking liquids and he can only tolerate ramen noodles.  He has lost some weight.  His blood pressure has been low.  He denies any fevers, night sweats, weight loss.  He is here for symptomatic treatment of his esophagitis.      PHYSICAL EXAMINATION:   GENERAL:  He is a middle-aged white male in no acute distress.   VITAL SIGNS:  Blood pressure 86/60, pulse 60, respirations 16, temperature 97.5.   HEENT:  Significant for dry oral mucosa.   NECK:  Supple.   LUNGS:  Clear to auscultation and percussion.   HEART:  Regular rate and rhythm, S1, S2 normal.   ABDOMEN:  Soft, nontender.   LYMPHATICS:  No cervical, supraclavicular, axillary or inguinal nodes.   EXTREMITIES:  No edema.   NEUROLOGIC:  Nonfocal.      LABORATORY DATA:  Laboratories reveal CBC with white count 1.0, neutrophil count 0.3, hemoglobin 11.0, hematocrit 31.1, platelet count 105.  Potassium 2.9, BUN 28, creatinine 1.06.  LFTs are normal.  LDH is 96.      IMPRESSION:  Extensive stage small cell lung carcinoma with asymptomatic brain metastases.  The patient was deemed a candidate for extensive stage  disease with carboplatin, etoposide, atezolizumab.  The plan was to proceed with 4 cycles, then restage with MRI of the brain as well as PET scan.  In the interim, the patient had a PET scan which indicated disease was present with a 5.8 cm primary, left hilar small cell lung cancer as well as subcarinal, right thoracic inlet and right upper abdominal lymphadenopathy, metastatic right adrenal nodule as well as T8 metastases.  The patient was seen by Radiation Oncology and was deemed a candidate for palliative radiation to T8 as well as the primary left hilar mass.  The patient went on to complete radiation therapy and completed 2 cycles of carboplatin, etoposide and atezolizumab, now with evidence of esophagitis, dehydration, hypernatremia, hypokalemia as well as neutropenia.  The plan at this point is to hydrate with 2 liters of normal saline and we will add 20 mEq of KCl, also prescribed K-Dur 20 mEq p.o. daily as well as initiate Neulasta 6 mg subcu today and then add Neulasta to his regimen.  Otherwise, we will see the patient as scheduled in approximately 3 weeks and obtain CBC, CMP, LDH.      Forty minutes was spent with the patient, greater than half the time spent in counseling and coordination of care.         ALANA NANCE MD             D: 2020   T: 2020   MT: SINDHU      Name:     NINA GODINEZ   MRN:      [id number]        Account:      [id number]   :      1950           Visit Date:   2020      Document: [id number]       cc: Bassam Grady MD

## 2020-02-07 NOTE — NURSING NOTE
"Patient presents to the clinic today with concerns with trouble eating and swallowing.  Landy Lamb LPN 2/7/2020   10:09 AM    Chief Complaint   Patient presents with     RECHECK     Lung/ Brain Mets       Initial BP (!) 86/62 (BP Location: Right arm, Patient Position: Sitting, Cuff Size: Adult Regular)   Pulse 60   Temp 97.5  F (36.4  C) (Oral)   Resp 16   Wt 75.3 kg (166 lb)   BMI 24.50 kg/m   Estimated body mass index is 24.5 kg/m  as calculated from the following:    Height as of 1/22/20: 1.753 m (5' 9.02\").    Weight as of this encounter: 75.3 kg (166 lb).  Medication Reconciliation: complete  Landy Lamb LPN    "

## 2020-02-07 NOTE — PROGRESS NOTES
Patient completing first bag of fluids, labs back noted to be low on potasium and sodium. Call placed to oncologist and received orders for 20 meq IV today and neupogen injection to help with his WBC. Patient in agreement with plan and will stay 2 hours longer to receive potassium today.

## 2020-02-10 NOTE — TELEPHONE ENCOUNTER
Spoke with pt and his wife today for nutrition therapy. Provided with handouts concerning wt loss, ways to increase calories and protein in his diet. Discussed keeping a food log to see what foods might trigger his pain. It seems sweet dairy foods often trigger a pain response. Provided with a list of potassium content of foods per request. Answered questions as best able. Pt and family had no questions or concerns at this time. Follow up as needed.     Akosua Leija RD on 2/10/2020 at 2:56 PM

## 2020-02-10 NOTE — PROGRESS NOTES
Infusion Nursing Note:  Babar Laboy presents today for Fluid Bolus and Potassium IV.    Patient seen by provider today: No   present during visit today: Not Applicable.    Note: Since potassium low at 2.9, 10 meq Potassium IV ordered per Darlin Sexton NP. Also, made apt to recheck labs to see if further fluids/potassium are needed this Thursday.    Nutritionist here for [place].     Intravenous Access:  Labs drawn without difficulty.  Implanted Port.    Treatment Conditions:  Lab Results   Component Value Date     02/10/2020                   Lab Results   Component Value Date    POTASSIUM 2.9 02/10/2020           No results found for: MAG         Lab Results   Component Value Date    CR 0.92 02/10/2020                   Lab Results   Component Value Date    DONELL 9.0 02/10/2020                Lab Results   Component Value Date    BILITOTAL 0.5 02/10/2020           Lab Results   Component Value Date    ALBUMIN 3.6 02/10/2020                    Lab Results   Component Value Date    ALT 12 02/10/2020           Lab Results   Component Value Date    AST 10 02/10/2020     Results reviewed, labs did NOT meet treatment parameters: Potassium low at 2.9.    Post Infusion Assessment:  Patient tolerated infusion without incident.  Blood return noted pre and post infusion.  Site patent and intact, free from redness, edema or discomfort.  No evidence of extravasations.  Access discontinued per protocol.     Discharge Plan:   Patient and/or family verbalized understanding of discharge instructions and all questions answered.  Copy of AVS reviewed with patient and/or family.  Patient will return 2/13/20 for next lab appointment  Patient discharged in stable condition accompanied by: wife.  Departure Mode: Ambulatory.    Brenda J. Goodell, RN

## 2020-02-10 NOTE — PROGRESS NOTES
Patient potassium low today at 2.9. Will have potasium 10meq today with his IV fluids. Will increase oral potassium to 20meq bid. Will recheck potassium level on Thursday 2/13

## 2020-02-12 NOTE — PROGRESS NOTES
02/12/20 1400       Present No   Comments Patient attended evaluation alone and spouse attended discussion following VFSS.   General Information   Type Of Visit Initial   Start Of Care Date 02/12/20   Referring Physician Dr. Jeff Hamilton   Orders Evaluate And Treat   Orders Comment Dysphagia, recent radiation   Medical Diagnosis Malignant neoplasm of lung, unspecified laterality, unspecified part of lung.  Dysphagia, unspecified   Onset Of Illness/injury Or Date Of Surgery 02/07/20   Precautions/limitations No Known Precautions/limitations   Hearing WFL   Pertinent History of Current Problem/OT: Additional Occupational Profile Info Patient is a 69 year old male with recently completed radiation therapy and current chemo therapy for extensive stage small lung carcinoma with asymptomatic brain metastases. Patient recently seen for exophagitis, dehydration, hypernatremia, hypokalemia, and neutropenia.  It was reported in medical records that patient was consuming noodles and water as main intake and recent weight loss.  Patient reported to this clinician that he had difficulty swallowing some foods (unspecified) with burning in esophagus region.   Prior Level Of Function Swallowing   Prior Level Of Function Comment Patient reported that swallowing problem was improving   Living Environment [facility]   General Observations Patient was pleasant, cooperative, and actively involved in all discussions regarding his care.   Patient/family Goals Patient would like to eat steak and a variety of textures that he has been avoiding   General Information Comments A variety of textures were trialed including noodles, chicken with rice chunks, cookie, and pill.   Fall Risk Screen   Fall screen completed by SLP   Have you fallen 2 or more times in the past year? No   Have you fallen and had an injury in the past year? No   Fall screen comments History of recent fall risk iniated 2/11/20   Abuse  Screen (yes response referral indicated)   Feels Unsafe at Home or Work/School no   Feels Threatened by Someone no   Does Anyone Try to Keep You From Having Contact with Others or Doing Things Outside Your Home? no   Physical Signs of Abuse Present no   Clinical Swallow Evaluation   Oral Musculature generally intact   Structural Abnormalities none present   Dentition present and adequate   Mucosal Quality adequate   Mandibular Strength and Mobility intact   Oral Labial Strength and Mobility WFL   Lingual Strength and Mobility WFL   Velar Elevation intact   Buccal Strength and Mobility intact   Laryngeal Function Cough;Throat clear;Swallow;Voicing initiated;Dry swallow palpated  (Rough vocal quality)   Oral Musculature Comments Oral musculature appropriate for functional swallow   Additional Documentation Yes   Additional evaluation(s) completed today Yes   Rationale for completing additional evaluation Concern for aspiration   VFSS Evaluation   VFSS Additional Documentation Yes   VFSS Eval: Radiology   Radiologist Dr. Almonte   Views Taken left lateral   Physical Location of Procedure [facility]   VFSS Eval: Thin Liquid Texture Trial   Mode of Presentation, Thin Liquid cup;self-fed   Order of Presentation 2   Preparatory Phase WFL   Oral Phase, Thin Liquid WFL   Pharyngeal Phase, Thin Liquid WFL   Rosenbek's Penetration Aspiration Scale: Thin Liquid Trial Results 1 - no aspiration, contrast does not enter airway   Diagnostic Statement [facility]   VFSS Eval: Nectar Thick Liquid Texture Trial   Mode of Presentation, Nectar cup;self-fed   Order of Presentation 1   Preparatory Phase WFL   Oral Phase, Nectar WFL   Pharyngeal Phase, [place] WFL   Rosenbek's Penetration Aspiration Scale: Nectar-Thick Liquid Trial Results 1 - no aspiration, contrast does not enter airway   Diagnostic Statement [facility]   VFSS Eval: Pudding Thick Liquid Texture Trial   Mode of Presentation, Pudding spoon;self-fed   Order of Presentation 3    Preparatory Phase WFL   Oral Phase, Pudding WFL   Pharyngeal Phase, Pudding WFL   Rosenbek's Penetration Aspiration Scale: Pudding-Thick Liquid Trial Results 1 - no aspiration, contrast does not enter airway   Diagnostic Statement WFL   VFSS Eval: Semisolid Texture Trial   Mode of Presentation, Semisolid spoon;self-fed   Order of Presentation 4   Preparatory Phase WFL   Oral Phase, Semisolid Poor AP movement;Residue in oral cavity   Pharyngeal Phase, Semisolid WFL   Rosenbek's Penetration Aspiration Scale: Semisolid Food Trial Results 1 - no aspiration, contrast does not enter airway   Diagnostic Statement Poor bolus transport with appropriate spontaneous compensation of chin up to aid in posterior bolus transport.  No pharyngeal difficulties.   VFSS Eval: Solid Food Texture Trial   Mode of Presentation, Solid self-fed   Order of Presentation 5 (cookie), 6 (pill)   Preparatory Phase WFL   Oral Phase, Solid Poor AP movement   Pharyngeal Phase, Solid WFL   Rosenbek's Penetration Aspiration Scale: Solid Food Trial Results 1 - no aspiration, contrast does not enter airway   Diagnostic Statement Poor bolus transport with appropriate spontaneous compensation of chin up to aid in posterior bolus transport.  No pharyngeal difficulties.   FEES Evaluation   Additional Documentation No   Swallow Compensations   Swallow Compensations   (Spontaneous chin up position to aid posterior movement)   Results Other (see comments)  (No pharyngeal difficulties)   Educational Assessment   Barriers to Learning No barriers   Preferred Learning Style Reading;Demonstration   Esophageal Phase of Swallow   Patient reports or presents with symptoms of esophageal dysphagia Other (Comments)  (Recent esphagitis )   Esophageal sweep performed during today s vidofluoroscopic exam  No   General Therapy Interventions   Intervention Comments Evaluation only   Swallow Eval: Clinical Impressions   Skilled Criteria for Therapy Intervention No significant  expected  improvement in functional status  (Appropriate spontaneous swallow compensation)   Functional Assessment Scale (FAS) 6   Dysphagia Outcome Severity Scale (ARPIT) Level 6 - ARPIT   Treatment Diagnosis Oral Dysphagia   Diet texture recommendations Regular diet   Recommended Feeding/Eating Techniques maintain upright posture during/after eating for 30 mins;small sips/bites;alternate between small bites and sips of food/liquid   Anticipated Discharge Disposition home   Risks and Benefits of Treatment have been explained. Yes   Patient, family and/or staff in agreement with Plan of Care Yes   Clinical Impression Comments Patietn demonstrating mild oral bolus transport difficulties and appropriate spontaneous use of chin up position to aid in posterior bolus movement.  Patient demonstrated appropriate pharyngeal swallow performance.   Total Session Time   SLP Eval: VideoFluoroscopic Swallow function Minutes (59149) 20   Total Evaluation Time 30   Therapy Certification   Certification date from 02/12/20   Certification date to 02/12/20   Medical Diagnosis Oral Dysphagia   Certification I certify the need for these services furnished under this plan of treatment and while under my care.  (Physician co-signature of this document indicates review and certification of the therapy plan).

## 2020-02-12 NOTE — PROGRESS NOTES
[facility]          OUTPATIENT PHYSICAL THERAPY ORTHOPEDIC EVALUATION  PLAN OF TREATMENT FOR OUTPATIENT REHABILITATION  (COMPLETE FOR INITIAL CLAIMS ONLY)  Patient's Last Name, First Name, M.I.  YOB: 1950  Babar Laboy    Provider s Name:  [facility]   Medical Record No.  [id number]   Start of Care Date:  02/11/20   Onset Date:  01/02/20   Type:     _X__PT   ___OT   ___SLP Medical Diagnosis:  (P) neuropathy     PT Diagnosis:  (P) general weakness, limited activity tolerance,  weakness, neuropathy   Visits from SOC:  1      _________________________________________________________________________________  Plan of Treatment/Functional Goals:  (P) balance training, manual therapy, neuromuscular re-education, strengthening, stretching  (P) 6 MWT  (P) Cryotherapy, Hot packs     Goals  Goal Identifier: (P) fatigue  Goal Description: (P) Patient to report improvement with fatigue with improved score on FACIT scale.  Target Date: (P) 05/11/20    Goal Identifier: (P) balance  Goal Description: (P) Patient to have improved score on Rowan balance test showing reduced risk for falls, scoring greater than 45 points.   Target Date: (P) 05/11/20    Goal Identifier: (P) stair use  Goal Description: (P) Patient to demonstrate ability to safely negotiate stairs in reciprocal pattern with out wihtout use of hand rail.   Target Date: (P) 05/11/20    Goal Identifier: (P)  strength  Goal Description: (P) Patient to demonstrate improved  and pincer strength by 10# to aid in daily functional tasks such as cooking, self cares.   Target Date: (P) 05/11/20    Goal Identifier: (P) HEP  Goal Description: (P) Patient to be compliant with HEP for maintaining functional gains from physical therapy interventions.   Target Date: (P) 05/10/20            Therapy Frequency:  (P) 2 times/Week  Predicted Duration of Therapy Intervention:  (P) 3 months    Radha Jones, PT                  I CERTIFY THE NEED FOR THESE SERVICES FURNISHED UNDER        THIS PLAN OF TREATMENT AND WHILE UNDER MY CARE     (Physician co-signature of this document indicates review and certification of the therapy plan).                       Certification Date From:  (P) 02/11/20   Certification Date To:  (P) 05/11/20    Referring Provider:  Jeff Hamilton MD    Initial Assessment        See Epic Evaluation Start of Care Date: 02/11/20

## 2020-02-12 NOTE — PROGRESS NOTES
02/11/20 0800   General Information   Type of Visit Initial OP Ortho PT Evaluation   Start of Care Date 02/11/20   Referring Physician Jeff Hamilton MD   Patient/Family Goals Statement improved fine motor control, strength   Orders Evaluate and Treat   Date of Order 01/22/20   Certification Required? Yes   Medical Diagnosis neuropathy   Surgical/Medical history reviewed Yes   Body Part(s)   Body Part(s) Lumbar Spine/SI;Shoulder   Presentation and Etiology   Pertinent history of current problem (include personal factors and/or comorbidities that impact the POC) Patient was diagnosed with extensive stage small cell lung cancer in December 2019 with mets to brain. Had been receiving chemo. Recently had 15 radiation paliative treatments to T8; back pain went away after the first treatment. Main complaint is bilateral hand neuropathy, drops objects, hard to know how much pressure he is using to hold objects. Also dealing with a lot of stomach pain and esophagitis. Having a swallow study. Has an appetite but unable to eat much due to pain. Right shoulder feels tight at times but it's not a major issue. Neuropathy started after first round of chemo on 1/2/20. Will try to do all tasks himself at home but it is frustrating at times trying to cook, hard to use his cell phone buttons, really hard to button a shirt   Impairments A. Pain;F. Decreased strength and endurance;K. Numbness;L. Tingling   Functional Limitations perform activities of daily living   Symptom Location pain is with stomach   Onset date of current episode/exacerbation 01/02/20   Chronicity New   Pain rating (0-10 point scale) Best (/10);Worst (/10)   Best (/10) 3   Worst (/10) 5   Pain quality B. Dull   Frequency of pain/symptoms A. Constant   Pain/symptoms exacerbated by F. Nothing   Pain/symptoms eased by D. Nothing   Progression of symptoms since onset: Unchanged   Prior Level of Function   Prior Level of Function-Mobility no limitations   Prior  Level of Function-ADLs no limitations   Current Level of Function   Patient role/employment history F. Retired   Fall Risk Screen   Fall screen completed by PT   Have you fallen 2 or more times in the past year? No   Have you fallen and had an injury in the past year? No   Is patient a fall risk? Yes   Fall screen comments Rowan test 43/56; DGI 22/24   Lumbar Spine/SI Objective Findings   Observation rounded, slumped posture. level pelvic and shoulder heights. full shoulder, elbow and hand ROM present. Using UE's to assist with sit to stand.    Gait/Locomotion limited trunk rotation   Hip Abduction Strength 3/5 strength   Hip Extension Strength 3/5 strength   Shoulder Objective Findings   Observation UE strength 5/5 bilateral.  test average rigtht 38.3#, left 30.67#. Patient is right handed. Pincer strength right and left 8#. Patient has difficulty with finger opposition left hand greater than right. Even more difficulty with resistance using tan colored thera putty.    Planned Therapy Interventions   Planned Therapy Interventions balance training;manual therapy;neuromuscular re-education;strengthening;stretching   Planned Therapy Interventions Comment 6 MWT   Planned Modality Interventions   Planned Modality Interventions Cryotherapy;Hot packs   Clinical Impression   Criteria for Skilled Therapeutic Interventions Met yes, treatment indicated   PT Diagnosis general weakness, limited activity tolerance,  weakness, neuropathy   Influenced by the following impairments weakness, numbness, tingling, limited activity tolerance   Functional limitations due to impairments fine motor control, picking up objects, limited with activity endurance, stair use, button shirts, cooking   Clinical Presentation Stable/Uncomplicated   Clinical Decision Making (Complexity) Low complexity   Therapy Frequency 2 times/Week   Predicted Duration of Therapy Intervention (days/wks) 3 months   Risk & Benefits of therapy have been  explained Yes   Patient, Family & other staff in agreement with plan of care Yes   Clinical Impression Comments Patient with onset of weakness and bilateral arm/hand neuropathy after first chemo treatment for metastatic small cell lung cancer.    ORTHO GOALS   PT Ortho Eval Goals 1;2;3;4;5   Ortho Goal 1   Goal Identifier fatigue   Goal Description Patient to report improvement with fatigue with improved score on FACIT scale.   Target Date 05/11/20   Ortho Goal 2   Goal Identifier balance   Goal Description Patient to have improved score on Rowan balance test showing reduced risk for falls, scoring greater than 45 points.    Target Date 05/11/20   Ortho Goal 3   Goal Identifier stair use   Goal Description Patient to demonstrate ability to safely negotiate stairs in reciprocal pattern with out wihtout use of hand rail.    Target Date 05/11/20   Ortho Goal 4   Goal Identifier  strength   Goal Description Patient to demonstrate improved  and pincer strength by 10# to aid in daily functional tasks such as cooking, self cares.    Target Date 05/11/20   Ortho Goal 5   Goal Identifier HEP   Goal Description Patient to be compliant with HEP for maintaining functional gains from physical therapy interventions.    Target Date 05/10/20   Total Evaluation Time   PT Eval, Low Complexity Minutes (48907) 20   Therapy Certification   Certification date from 02/11/20   Certification date to 05/11/20   Medical Diagnosis neuropathy

## 2020-02-17 NOTE — PROGRESS NOTES
Infusion Nursing Note:  Babar Laboy presents today for cycle 3 day 1.    Patient seen by provider today: No   present during visit today: Not Applicable.    Note: N/A.    Intravenous Access:  Labs drawn without difficulty.  Implanted Port.    Treatment Conditions:  Lab Results   Component Value Date    HGB 10.1 02/17/2020     Lab Results   Component Value Date    WBC 4.9 02/17/2020      Lab Results   Component Value Date    ANEU 3.5 02/17/2020     Lab Results   Component Value Date     02/17/2020      Lab Results   Component Value Date     02/17/2020                   Lab Results   Component Value Date    POTASSIUM 3.9 02/17/2020           No results found for: MAG         Lab Results   Component Value Date    CR 0.77 02/17/2020                   Lab Results   Component Value Date    DONELL 9.1 02/17/2020                Lab Results   Component Value Date    BILITOTAL 0.3 02/17/2020           Lab Results   Component Value Date    ALBUMIN 3.6 02/17/2020                    Lab Results   Component Value Date    ALT 17 02/17/2020           Lab Results   Component Value Date    AST 14 02/17/2020       Results reviewed, labs MET treatment parameters, ok to proceed with treatment.      Post Infusion Assessment:  Patient tolerated infusion without incident.  Blood return noted pre and post infusion.  Site patent and intact, free from redness, edema or discomfort.  No evidence of extravasations.       Discharge Plan:   Patient declined prescription refills.  Patient and/or family verbalized understanding of discharge instructions and all questions answered.  Copy of AVS reviewed with patient and/or family.  Patient will return tomorrow for next appointment.  Patient discharged in stable condition accompanied by: wife.  Departure Mode: Ambulatory.    Jean S. Hammann, RN

## 2020-02-17 NOTE — TELEPHONE ENCOUNTER
Spoke with wife. States patient is being treated for cancer and has lost a lot of weight, quit smoking and doesn't take any caffeine anymore. Since these changes he has had some issues with his blood pressure being to low in the morning 98/54 this morning. Because of this they are not sure when to take his blood pressure med and have been holding off taking it till afternoon when he used to take it in the morning. They were wondering about getting an appointment this week to talk about this or would like some input from you on what to do with his blood pressure meds. Only appointments open this week are same day. Please advise.  Yarelis Rizzo LPN.........................2/17/2020  11:17 AM

## 2020-02-17 NOTE — ADDENDUM NOTE
Encounter addended by: Hammann, Jean S., RN on: 2/17/2020 1:12 PM   Actions taken: Flowsheet accepted, Clinical Note Signed

## 2020-02-18 NOTE — TELEPHONE ENCOUNTER
Called wife and patient and verified name and date of birth. Informed them both of the message from [facility] and no further questions. Patient comes in for infusion therapy and gets his blood pressure checked at those visits.   They report they will come in for nurse only blood pressure checks as well.  Tavia Merritt LPN on 2/18/2020 at 8:48 AM

## 2020-02-18 NOTE — PROGRESS NOTES
Infusion Nursing Note:  Babar Laboy presents today for day 2 etopside.    Patient seen by provider today: No   present during visit today: Not Applicable.    Note: N/A.    Intravenous Access:  Implanted Port.    Treatment Conditions:  Not Applicable.      Post Infusion Assessment:  Patient tolerated infusion without incident.  Blood return noted pre and post infusion.  Site patent and intact, free from redness, edema or discomfort.  No evidence of extravasations.       Discharge Plan:   Discharge instructions reviewed with: Patient and Family.  Patient and/or family verbalized understanding of discharge instructions and all questions answered.  Patient discharged in stable condition accompanied by: wife.  Departure Mode: Ambulatory returns tomorrow for day 3.    Jean S. Hammann, RN

## 2020-02-18 NOTE — TELEPHONE ENCOUNTER
-- Stop hydrochlorothiazide   -- Notify MD if SBP is < 100 or > 140 more often than not   -- Consider coming in for a nurse-only BP check    Signed, Mario Grady MD, FAAP, FACP  Internal Medicine & Pediatrics

## 2020-02-19 NOTE — PROGRESS NOTES
Visit Date:   02/19/2020      HISTORY OF PRESENT ILLNESS:  Mr. Laboy returns for followup of extensive stage small cell lung carcinoma with brain metastases.  For details, please see previous notes.  He has received 2 cycles of chemotherapy with carboplatin, etoposide and atezolizumab.  He also has completed radiation therapy, palliative to T8 as well as left hilar region.  He received a total of 600 cGy over 20 elapsed days.  He completed this on 01/29.  The patient was found to have esophagitis and was placed on Magic Mouthwash and oxycodone.  Apparently, he had called Dr. Kelly, Radiation Oncology, with complaints of worsening esophagitis.  He recommended taking oxycodone 5 mg q.6 hours p.r.n. and Tylenol as needed as well as Magic mouthwash 4 times a day.  He was seen last on 02/07 with complaints of worsening dysphagia and odynophagia.  He says he is primarily drinking liquids and can only tolerate ramen noodles.  He had lost some weight.  When we saw the patient on 02/07, we felt he was dehydrated with esophagitis.  We recommended 2 liters of normal saline as well as potassium replacement, as well as initiate Neulasta 6 mg subcu to prevent neutropenia and then add Neulasta to his regimen.  The patient is now here for his third cycle of carboplatin, etoposide and atezolizumab.  He says he did have a swallowing study, which was negative for aspiration.  He says he was doing well until yesterday when he developed more esophageal pain.  He has not been taking Magic Mouthwash.  He says that seems to help.  He also has oxycodone helps.  He has gained 2 pounds though.  Otherwise, he offers no other complaints of fevers, night sweats, weight loss.      PHYSICAL EXAMINATION:   GENERAL:  He is a middle-aged white male in no acute distress.   VITAL SIGNS:  Stable, he is afebrile.   HEENT:  Atraumatic, normocephalic.  Oropharynx nonerythematous.   NECK:  Supple.   LUNGS:  Clear to auscultation and percussion.   HEART:   Regular rhythm, S1, S2 normal.   ABDOMEN:  Soft, normoactive bowel sounds, nontender.   LYMPHATICS:  No cervical, supraclavicular, axillary or inguinal nodes.   EXTREMITIES:  No edema.   NEUROLOGIC:  Nonfocal.      LABORATORY DATA:  Laboratories reveal CBC with white count 4.9, H and H 10.1 and 29.1, platelet count is 300.  Sodium 131, potassium 3.9, chloride 97, glucose 126, BUN 11, creatinine 0.77.  LFTs are normal.      IMPRESSION:  Extensive stage small cell lung carcinoma with asymptomatic brain metastases.  The patient was deemed a candidate for extensive stage disease with carboplatin, etoposide, atezolizumab.  The plan was to proceed with 4 cycles, then restage with MRI of the brain as well as PET scan.  In the interim, the patient had a PET scan which indicated disease was present with a 5.8 cm primary, left hilar small cell lung cancer as well as subcarinal and right thoracic inlet and right upper abdominal lymphadenopathy and metastatic right adrenal nodule as well as T8 metastases.  The patient was seen by Radiation Oncology and was deemed a candidate for palliative radiation to T8 as well as the primary left hilar mass.  The patient went on to complete radiation therapy, completed carboplatin, etoposide and atezolizumab.  Course complicated by esophagitis, dehydration, hyponatremia, hypokalemia as well as neutropenia requiring IV hydration and potassium replacement as well as neutropenia requiring Neulasta.  The patient is now here for third cycle of carboplatin, etoposide, and atezolizumab.  There is no contraindication to therapy.  The patient was encouraged to take oxycodone as well as Magic mouthwash for his esophageal pain.  Otherwise, we will plan on seeing the patient after his fourth cycle of chemotherapy and restage with PET scan and MRI of the brain.      Forty minutes was spent with patient, greater than half the time spent in counseling and coordination of care.         ALANA NANCE,  MD             D: 2020   T: 2020   MT: MANI      Name:     NINA GODINEZ   MRN:      [id number]        Account:      [id number]   :      1950           Visit Date:   2020      Document: [id number]       cc: Duy Grady MD

## 2020-02-19 NOTE — NURSING NOTE
Patient is being seen by the provider in infusion where all required infusion department rooming assessments, screenings, and vital signs were done by infusion RN.   Zaria Man RN...........2/19/2020 8:53 AM

## 2020-02-19 NOTE — PROGRESS NOTES
Infusion Nursing Note:  Babar Laboy presents today for day 3 etoposide.    Patient seen by provider today: Yes: Provatas   present during visit today: Not Applicable.    Note: patient has poor appetite encouraged to take antinausea medication proactively to see if that helps with appetite.    Intravenous Access:  Implanted Port.    Treatment Conditions:  Not Applicable.      Post Infusion Assessment:  Patient tolerated infusion without incident.  Blood return noted pre and post infusion.  Site patent and intact, free from redness, edema or discomfort.  No evidence of extravasations.  Access discontinued per protocol.       Discharge Plan:   Discharge instructions reviewed with: Patient and Family.  Patient and/or family verbalized understanding of discharge instructions and all questions answered.  Patient discharged in stable condition accompanied by: wife.  Departure Mode: Ambulatory decline AVS received Monday..    Jean S. Hammann, RN

## 2020-02-21 NOTE — TELEPHONE ENCOUNTER
Dr Jay, This an order from Dr Hamilton---Dysphagia, unspecified type  Malignant neoplasm of hilus of left lung (H)  Gastroesophageal reflux disease with esophagitis---please advise.  Thank you-

## 2020-02-21 NOTE — PROGRESS NOTES
Dr. Hamilton to see patient in infusion due to difficulty with swallowing. Per Dr. Hamilton he would like to order nexium 40mg daily for esophagitis,  Carafate liquid 1gram four times daily, and an EGD for diagnostic purpose with Dr. Quiros. Dr. Hamilton would like to see him following the EGD. Orders pended for Dr. Hamilton to sign.     Cris Toussaint RN on 2/21/2020 at 10:51 AM

## 2020-02-21 NOTE — PROGRESS NOTES
Infusion Nursing Note:  Babar Laboy presents today for fluids.    Patient seen by provider today:Venessa SHORT CHECK   present during visit today: Not Applicable.    Note: during fluid bolus patient complained about burning in abdomen spoke with Provatas and he ordered Pepcid IV and changed some home medications.    Intravenous Access:  Implanted Port.    Treatment Conditions:  Not Applicable.      Post Infusion Assessment:  Patient tolerated infusion without incident.  Blood return noted pre and post infusion.  Site patent and intact, free from redness, edema or discomfort.  No evidence of extravasations.  Access discontinued per protocol.       Discharge Plan:   Discharge instructions reviewed with: Patient and Family.  Patient discharged in stable condition accompanied by: wife.  Departure Mode: Ambulatory.    Jean S. Hammann, RN

## 2020-02-23 NOTE — ED TRIAGE NOTES
Patient to ER with spouse, he reports he is receiving chemotherapy for lung cancer. He reports he is feeling dehydrated due to poor oral intake and needs fluids. He has a standing order with oncology for fluid replacement, but infusion is not open today.  Patient is also doctoring with general surgery for a gastric ulcer due to abd pain

## 2020-02-23 NOTE — ED AVS SNAPSHOT
[facility]  [address]  [place] [address]  Phone:  [phone]  Fax:  [phone]                                    Babar Laboy   MRN: [id number]    Department:  [facility]   Date of Visit:  2/23/2020           After Visit Summary Signature Page    I have received my discharge instructions, and my questions have been answered. I have discussed any challenges I see with this plan with the nurse or doctor.    ..........................................................................................................................................  Patient/Patient Representative Signature      ..........................................................................................................................................  Patient Representative Print Name and Relationship to Patient    ..................................................               ................................................  Date                                   Time    ..........................................................................................................................................  Reviewed by Signature/Title    ...................................................              ..............................................  Date                                               Time          22EPIC Rev 08/18

## 2020-02-24 NOTE — TELEPHONE ENCOUNTER
Potassium normal. Wife states that he has been taking 2 tabs in am and 2 tabs in pm for about 2 weeks. She was not exactly sure. Advised to take 20 meq daily per Darlin Sexton NP due to fact that potassium has been normal while taking 20 meq daily. Will need potassium and magnesium rechecked next week.  Zaria Man RN...........2/24/2020 8:59 AM

## 2020-02-24 NOTE — TELEPHONE ENCOUNTER
Patient tried to get a refill on Potassium over the weekend but was too soon to fill because he's taking more now.  Please call Julianne regarding how much he should be taking now and further refills.

## 2020-02-24 NOTE — ED PROVIDER NOTES
History     Chief Complaint   Patient presents with     Dehydration     on chemo, reports feeling dehydrated and needing fluids     Anorexia     poor intake, working with general surgery for abd pain workup      HPI  Babar Laboy is a 69 year old male who presents to the emergency room due to epigastric pain and poor oral intake.  He is getting chemotherapy for lung cancer.  Getting worked up, has consult on Tuesday, with general surgery, for evaluation of upper abdominal pain.  He is being treated for possible ulcer.    States that sometimes when he eats or drinks he gets upper abdominal pain is not able to eat or drink a whole lot.    Sometimes it hurts to swallow or eat.    Yesterday he was able to eat/drink okay but today he was not able to.    He is feeling quite lightheaded and dizzy today.    He is getting chemotherapy for lung cancer.  Has standing orders at infusion for IV fluids but they are not open today.    Allergies:  No Known Allergies    Problem List:    Patient Active Problem List    Diagnosis Date Noted     Malignant neoplasm of hilus of left lung (H) 12/20/2019     Priority: Medium     NESSA (obstructive sleep apnea) 08/08/2019     Priority: Medium     Paroxysmal atrial fibrillation (H) 08/07/2019     Priority: Medium     New onset a-fib 7/1/2019 08/07/2019     Priority: Medium     Abnormal stress test on 7/18/2019 08/07/2019     Priority: Medium     Tobacco abuse 08/07/2019     Priority: Medium     Tobacco abuse counseling 08/07/2019     Priority: Medium     Ascending aortic aneurysm (H) 08/07/2019     Priority: Medium     Asymmetric septal hypertrophy (H) 08/07/2019     Priority: Medium     On continuous oral anticoagulation 08/07/2019     Priority: Medium     Benign essential hypertension 05/15/2018     Priority: Medium     H/O adenomatous polyp of colon 04/25/2016     Priority: Medium     Mixed hyperlipidemia 01/28/2016     Priority: Medium     Acne rosacea 12/01/2011     Priority: Medium         Past Medical History:    Past Medical History:   Diagnosis Date     Atrial fibrillation (H)      Encounter for general adult medical examination without abnormal findings      Essential (primary) hypertension      Other microscopic hematuria      Other problems related to lifestyle      Residual hemorrhoidal skin tags      Rosacea      Tobacco use      Unilateral inguinal hernia without obstruction or gangrene        Past Surgical History:    Past Surgical History:   Procedure Laterality Date     COLONOSCOPY      ,F/U      COLONOSCOPY  2016,F/U  tubular adenomas     INSERT PORT VASCULAR ACCESS Right 2019    Procedure: INSERTION, VASCULAR ACCESS PORT;  Surgeon: Nitin Quiros MD;  Location: [facility] OR     OTHER SURGICAL HISTORY      422758,OTHER     OTHER SURGICAL HISTORY      [id number],OTHER     OTHER SURGICAL HISTORY      15,,HERNIA REPAIR,Right,RIH with mesh     RELEASE CARPAL TUNNEL      ,right       Family History:    Family History   Problem Relation Age of Onset     Hypertension Father      Heart Disease Father      Myocardial Infarction Father      Cerebrovascular Disease Father      Hypertension Other         Hypertension,Multiple family members     Pancreatic Cancer Brother      Colon Cancer No family hx of      Prostate Cancer No family hx of        Social History:  Marital Status:   [2]  Social History     Tobacco Use     Smoking status: Former Smoker     Packs/day: 0.50     Years: 42.00     Pack years: 21.00     Types: Cigarettes     Last attempt to quit: 12/3/2019     Years since quittin.2     Smokeless tobacco: Never Used   Substance Use Topics     Alcohol use: Not Currently     Comment: quit 2 weeks ago     Drug use: No        Medications:    acetaminophen (TYLENOL) 500 MG tablet  magic mouthwash (ENTER INGREDIENTS IN COMMENTS) suspension  oxyCODONE 5 MG PO tablet  sucralfate 1 GM/10ML PO suspension  apixaban ANTICOAGULANT (ELIQUIS) 5 MG  "tablet  atenolol (TENORMIN) 50 MG tablet  atorvastatin (LIPITOR) 10 MG tablet  dextromethorphan (DELSYM) 30 MG/5ML liquid  docusate sodium (COLACE) 100 MG capsule  esomeprazole 40 MG PO DR capsule  gabapentin (NEURONTIN) 300 MG capsule  glutamine 500 MG TABS  HEMP OIL OR EXTRACT OR OTHER CBD CANNABINOID, NOT MEDICAL CANNABIS,  lidocaine (XYLOCAINE) 2 % solution  lidocaine-prilocaine (EMLA) 2.5-2.5 % external cream  LORazepam (ATIVAN) 0.5 MG tablet  losartan (COZAAR) 25 MG tablet  NONFORMULARY  polyethylene glycol (MIRALAX/GLYCOLAX) packet  potassium chloride (KLOR-CON) 20 MEQ packet  potassium chloride ER (K-TAB/KLOR-CON) 10 MEQ CR tablet  prochlorperazine (COMPAZINE) 10 MG tablet  vitamin B6 (PYRIDOXINE) 100 MG tablet          Review of Systems   Constitutional: Negative for chills and fever.   HENT: Negative for congestion and hearing loss.    Eyes: Negative for visual disturbance.   Respiratory: Negative for cough, shortness of breath and wheezing.    Cardiovascular: Negative for chest pain and palpitations.   Gastrointestinal: Positive for abdominal pain and diarrhea. Negative for nausea and vomiting.   Endocrine: Negative for cold intolerance and heat intolerance.   Genitourinary: Negative for dysuria and hematuria.   Musculoskeletal: Negative for arthralgias and myalgias.   Skin: Negative for rash and wound.   Allergic/Immunologic: Positive for immunocompromised state.   Neurological: Positive for dizziness and light-headedness. Negative for syncope.   Hematological: Bruises/bleeds easily.   Psychiatric/Behavioral: Negative for agitation and confusion.       Physical Exam   BP: (!) 144/96  Pulse: 63  Heart Rate: 70  Temp: 97.3  F (36.3  C)  Resp: 18  Height: 175.3 cm (5' 9\")  Weight: 74.8 kg (165 lb)  SpO2: 99 %      Physical Exam  Constitutional:       Appearance: He is ill-appearing.   HENT:      Head: Normocephalic and atraumatic.      Nose: No rhinorrhea.      Mouth/Throat:      Pharynx: No posterior " "oropharyngeal erythema.   Eyes:      General: No scleral icterus.     Conjunctiva/sclera: Conjunctivae normal.   Neck:      Musculoskeletal: No neck rigidity.   Cardiovascular:      Rate and Rhythm: Normal rate and regular rhythm.   Pulmonary:      Effort: Pulmonary effort is normal.      Breath sounds: No wheezing or rhonchi.   Abdominal:      Palpations: Abdomen is soft.      Tenderness: There is abdominal tenderness (epigastric).   Musculoskeletal:         General: No deformity.      Right lower leg: No edema.      Left lower leg: No edema.   Skin:     Coloration: Skin is pale.      Findings: No rash.   Neurological:      General: No focal deficit present.      Mental Status: He is alert and oriented to person, place, and time.   Psychiatric:         Mood and Affect: Mood normal.         Behavior: Behavior normal.       Patient Vitals for the past 24 hrs:   BP Temp Temp src Pulse Resp SpO2 Height Weight   02/23/20 2030 (!) 163/97 -- -- 60 -- 99 % -- --   02/23/20 2000 (!) 165/102 -- -- 58 -- 100 % -- --   02/23/20 1930 (!) 156/92 -- -- 61 -- 96 % -- --   02/23/20 1900 (!) 142/92 -- -- 56 -- 98 % -- --   02/23/20 1830 131/88 -- -- 58 -- 93 % -- --   02/23/20 1800 (!) 131/97 -- -- 63 -- 97 % -- --   02/23/20 1744 (!) 144/96 97.3  F (36.3  C) Temporal -- 18 99 % 1.753 m (5' 9\") 74.8 kg (165 lb)     Orders Placed This Encounter   Procedures     Basic metabolic panel     CBC with platelets differential     Lipase     Peripheral IV catheter       ED Course     ED Course as of Feb 23 2053   Sun Feb 23, 2020 1944 Immunocompromised patient getting chemotherapy for lung cancer.  Nausea vomiting and abdominal pain.  Getting work-up for possible ulcer.Ibuprofen x-ray milligrams ordered.  1 L bolus of lactated Ringer's followed by a second liter.  Normally takes pain medication at about this time, 5 mg oxycodone ordered.  Labs ordered.  White count is low.  Hemoglobin is low.      2050 Second liter of lactated Ringer's " administered.  Patient is feeling much better.  He would like to discharge home.        Procedures                  Results for orders placed or performed during the hospital encounter of 02/23/20 (from the past 24 hour(s))   Basic metabolic panel   Result Value Ref Range    Sodium 134 134 - 144 mmol/L    Potassium 4.6 3.5 - 5.1 mmol/L    Chloride 102 98 - 107 mmol/L    Carbon Dioxide 24 21 - 31 mmol/L    Anion Gap 8 3 - 14 mmol/L    Glucose 106 (H) 70 - 105 mg/dL    Urea Nitrogen 13 7 - 25 mg/dL    Creatinine 0.73 0.70 - 1.30 mg/dL    GFR Estimate >90 >60 mL/min/[1.73_m2]    GFR Estimate If Black >90 >60 mL/min/[1.73_m2]    Calcium 9.3 8.6 - 10.3 mg/dL   CBC with platelets differential   Result Value Ref Range    WBC 1.6 (L) 4.0 - 11.0 10e9/L    RBC Count 2.99 (L) 4.4 - 5.9 10e12/L    Hemoglobin 9.2 (L) 13.3 - 17.7 g/dL    Hematocrit 27.0 (L) 40.0 - 53.0 %    MCV 90 78 - 100 fl    MCH 30.8 26.5 - 33.0 pg    MCHC 34.1 31.5 - 36.5 g/dL    RDW 16.3 (H) 10.0 - 15.0 %    Platelet Count 274 150 - 450 10e9/L    Diff Method Automated Method     % Neutrophils 81.2 %    % Lymphocytes 11.3 %    % Monocytes 2.5 %    % Eosinophils 1.9 %    % Basophils 0.0 %    % Immature Granulocytes 3.1 %    Absolute Neutrophil 1.3 (L) 1.6 - 8.3 10e9/L    Absolute Lymphocytes 0.2 (L) 0.8 - 5.3 10e9/L    Absolute Monocytes 0.0 0.0 - 1.3 10e9/L    Absolute Eosinophils 0.0 0.0 - 0.7 10e9/L    Absolute Basophils 0.0 0.0 - 0.2 10e9/L    Abs Immature Granulocytes 0.1 0 - 0.4 10e9/L   Lipase   Result Value Ref Range    Lipase 21 11 - 82 U/L       Medications   lactated ringers BOLUS 1,000 mL (0 mLs Intravenous Stopped 2/23/20 1950)   pantoprazole (PROTONIX) 40 mg IV push injection (40 mg Intravenous Given 2/23/20 1847)   oxyCODONE (ROXICODONE) tablet 5 mg (5 mg Oral Given 2/1952)   lactated ringers BOLUS 1,000 mL (0 mLs Intravenous Stopped 2/23/20 2050)       Assessments & Plan (with Medical Decision Making)     I have reviewed the nursing  notes.    I have reviewed the findings, diagnosis, plan and need for follow up with the patient.  Patient feeling much better after second liter of IV fluids.  He would like to discharge home.  Patient discharged home in stable, improved condition with his wife.  Patient follow-up as needed for new or worsening symptoms.  Advised to maintain surgical consult later this week as scheduled    New Prescriptions    No medications on file       Final diagnoses:   Hypovolemia   Immunocompromised state due to drug therapy   Antineoplastic chemotherapy induced pancytopenia (H)       2/23/2020   [facility]     Cristhian Velazquez MD  02/23/20 2055

## 2020-02-24 NOTE — DISCHARGE INSTRUCTIONS
Your white blood cell count and hemoglobin have dropped some due to chemotherapy.    IV fluids x2 liters and 40 mg of IV Protonix administered in the emergency room tonight.    Keep follow-up with general surgery as scheduled later this week.    Return to emergency room as needed for new or worsening symptoms.

## 2020-02-25 NOTE — NURSING NOTE
"Chief Complaint   Patient presents with     Consult     discuss EGD and possible PEG tube Placement.       Initial /62 (BP Location: Right arm, Patient Position: Sitting, Cuff Size: Adult Regular)   Pulse 64   Temp 97.5  F (36.4  C) (Tympanic)   Resp 16   Ht 1.715 m (5' 7.5\")   Wt 74.1 kg (163 lb 6.4 oz)   BMI 25.21 kg/m   Estimated body mass index is 25.21 kg/m  as calculated from the following:    Height as of this encounter: 1.715 m (5' 7.5\").    Weight as of this encounter: 74.1 kg (163 lb 6.4 oz).  Medication Reconciliation: Completed     Kassy Lester LPN  "

## 2020-02-25 NOTE — TELEPHONE ENCOUNTER
Screening Questions for the Scheduling of Screening Colonoscopies   (If Colonoscopy is diagnostic, Provider should review the chart before scheduling.)  Are you younger than 50 or older than 80?  NO   Do you take aspirin or fish oil?  NO  (if yes, tell patient to stop 1 week prior to Colonoscopy)  Do you take warfarin (Coumadin), clopidogrel (Plavix), apixaban (Eliquis), dabigatram (Pradaxa), rivaroxaban (Xarelto) or any blood thinner? YES - ELIQUIS   - WILL HOLD 3 DAYS PRIOR  PER DR MARAVILLA   Do you use oxygen at home?  NO   Do you have kidney disease? NO   Are you on dialysis? NO   Have you had a stroke or heart attack in the last year? NO   Have you had a stent in your heart or any blood vessel in the last year? NO   Have you had a transplant of any organ? NO   Have you had a colonoscopy or upper endoscopy (EGD) before? YES          When?  ?  Date of scheduled EGD  03/02/2020  Provider MARAVILLA   Pharmacy

## 2020-02-26 NOTE — H&P (VIEW-ONLY)
GENERAL SURGERY CONSULTATION NOTE    Babra Laboy   [address]  [facility] [address]  69 year old  male  Admission Date/Time: No admission date for patient encounter.  Primary Care Provider:  Mario Grady    I was asked to see this patient by Dr. Hamilton for evaluation of dysphagia/epigastric/substernal pain .     HPI: Babar Laboy is a 69 year old male with metastatic lung cancer who has started radiation and chemo. He has developed pain with swallowing that is worse with cold liquids. He is able to swallow meat has avoided some breads. Pt has lost some weight but stabilized. Pt is a month out from his last radiation. Sounds like chemotherapy will be ongoing. Carafate does not seem to make a difference.  Magic mouthwash provides relief.  No black stools. No hematemesis.     REVIEW OF SYSTEMS:    GENERAL: No fevers or chills. Denies fatigue, recent weight loss.  HEENT: No sinus drainage. No changes with vision or hearing. No difficulty swallowing.   LYMPHATICS:  No swollen nodes in axilla, neck or groin.  CARDIOVASCULAR: Denies chest pain, palpitations and dyspnea on exertion.  PULMONARY: No shortness of breath or cough. No increase in sputum production.  GI: Denies melena, bright red blood in stools. No hematemesis. No constipation or diarrhea.  : No dysuria or hematuria.  SKIN: No recent rashes or ulcers.   HEMATOLOGY:  No history of easy bruising or bleeding.  ENDOCRINE:  Nohistory of diabetes or thyroid problems.  NEUROLOGY:  No history of seizures or headaches. No motor or sensory changes.        Patient Active Problem List   Diagnosis     Acne rosacea     H/O adenomatous polyp of colon     Mixed hyperlipidemia     Benign essential hypertension     Paroxysmal atrial fibrillation (H)     New onset a-fib 7/1/2019     Abnormal stress test on 7/18/2019     Tobacco abuse     Tobacco abuse counseling     Ascending aortic aneurysm (H)     Asymmetric septal hypertrophy (H)     On continuous oral  anticoagulation     NESSA (obstructive sleep apnea)     Malignant neoplasm of hilus of left lung (H)       Past Medical History:   Diagnosis Date     Atrial fibrillation (H)      Encounter for general adult medical examination without abnormal findings     No Comments Provided     Essential (primary) hypertension     No Comments Provided     Other microscopic hematuria     No Comments Provided     Other problems related to lifestyle     4-5 beers/day     Residual hemorrhoidal skin tags     12/05,noted on colonoscopy     Rosacea     12/1/2011     Tobacco use     No Comments Provided     Unilateral inguinal hernia without obstruction or gangrene     12/1/2011       Past Surgical History:   Procedure Laterality Date     COLONOSCOPY      12/05,F/U 2015     COLONOSCOPY  04/25/2016 4/25/16,F/U 2021 tubular adenomas     INSERT PORT VASCULAR ACCESS Right 12/23/2019    Procedure: INSERTION, VASCULAR ACCESS PORT;  Surgeon: Nitin Quiros MD;  Location: [facility] OR     OTHER SURGICAL HISTORY      [id number],OTHER     OTHER SURGICAL HISTORY      [id number],OTHER     OTHER SURGICAL HISTORY      1/29/13,,HERNIA REPAIR,Right,RIH with mesh     RELEASE CARPAL TUNNEL      2003,right       Family History   Problem Relation Age of Onset     Hypertension Father      Heart Disease Father      Myocardial Infarction Father      Cerebrovascular Disease Father      Hypertension Other         Hypertension,Multiple family members     Pancreatic Cancer Brother      Colon Cancer No family hx of      Prostate Cancer No family hx of        Social History     Social History Narrative    .  Two sons living at home.  He had a daughter in her early 20's who was killed in a motor vehicle accident in 2004.      Wife works outside the home as well.      Patient is now retired from [employer].    **pre upload 01/10/2013**       Social History     Socioeconomic History     Marital status:      Spouse name: Julianne     Carlos of  children: 2     Years of education: Not on file     Highest education level: Not on file   Occupational History     Occupation: maintenance [employer]     Comment: retired   Social Needs     Financial resource strain: Not on file     Food insecurity:     Worry: Not on file     Inability: Not on file     Transportation needs:     Medical: Not on file     Non-medical: Not on file   Tobacco Use     Smoking status: Former Smoker     Packs/day: 0.50     Years: 42.00     Pack years: 21.00     Types: Cigarettes     Last attempt to quit: 12/3/2019     Years since quittin.2     Smokeless tobacco: Never Used   Substance and Sexual Activity     Alcohol use: Not Currently     Comment: quit 2 weeks ago     Drug use: No     Sexual activity: Yes     Partners: Female   Lifestyle     Physical activity:     Days per week: Not on file     Minutes per session: Not on file     Stress: Not on file   Relationships     Social connections:     Talks on phone: Not on file     Gets together: Not on file     Attends Confucianist service: Not on file     Active member of club or organization: Not on file     Attends meetings of clubs or organizations: Not on file     Relationship status: Not on file     Intimate partner violence:     Fear of current or ex partner: Not on file     Emotionally abused: Not on file     Physically abused: Not on file     Forced sexual activity: Not on file   Other Topics Concern     Parent/sibling w/ CABG, MI or angioplasty before 65F 55M? Not Asked   Social History Narrative    .  Two sons living at home.  He had a daughter in her early 20's who was killed in a motor vehicle accident in .      Wife works outside the home as well.      Patient is now retired from [employer].    **pre upload 01/10/2013**       acetaminophen (TYLENOL) 500 MG tablet, Take 1,000 mg by mouth every 6 hours as needed for mild pain  apixaban ANTICOAGULANT (ELIQUIS) 5 MG tablet, Take 1 tablet (5 mg) by mouth  2 times daily  atenolol (TENORMIN) 50 MG tablet, Take 1 tablet (50 mg) by mouth daily Dose increase  atorvastatin (LIPITOR) 10 MG tablet, Take 1 tablet (10 mg) by mouth daily  docusate sodium (COLACE) 100 MG capsule, Take 100 mg by mouth as needed for constipation  esomeprazole 40 MG PO DR capsule, Take 1 capsule (40 mg) by mouth every morning (before breakfast) Take 30-60 minutes before eating.  gabapentin (NEURONTIN) 300 MG capsule, Take 1 capsule (300 mg) by mouth 3 times daily  glutamine 500 MG TABS, Take 500 mg by mouth daily  HEMP OIL OR EXTRACT OR OTHER CBD CANNABINOID, NOT MEDICAL CANNABIS,,   lidocaine (XYLOCAINE) 2 % solution,   lidocaine-prilocaine (EMLA) 2.5-2.5 % external cream, Apply to port site 1 hour prior to needlesticks  LORazepam (ATIVAN) 0.5 MG tablet, Take 1 tablet (0.5 mg) by mouth every 4 hours as needed (Anxiety, Nausea/Vomiting or Sleep)  losartan (COZAAR) 25 MG tablet, Take 1 tablet (25 mg) by mouth daily  magic mouthwash (ENTER INGREDIENTS IN COMMENTS) suspension, Swish, gargle, and swallow one to two teaspoonfuls every six hours as needed. May be swallowed if esophageal involvement.  NONFORMULARY, Take 1 capsule by mouth daily Probiotic Colon Supplement  oxyCODONE 5 MG PO tablet, Take 1 tablet (5 mg) by mouth every 6 hours as needed for pain  potassium chloride ER (K-TAB/KLOR-CON) 10 MEQ CR tablet, Take 2 tablets (20 mEq) by mouth daily  prochlorperazine (COMPAZINE) 10 MG tablet, Take 1 tablet (10 mg) by mouth every 6 hours as needed (Nausea/Vomiting)  sucralfate 1 GM/10ML PO suspension, Take 10 mLs (1 g) by mouth 4 times daily  vitamin B6 (PYRIDOXINE) 100 MG tablet, Take 1 tablet (100 mg) by mouth daily    No current facility-administered medications on file prior to visit.         ALLERGIES/SENSITIVITIES: No Known Allergies    PHYSICAL EXAM:     /62 (BP Location: Right arm, Patient Position: Sitting, Cuff Size: Adult Regular)   Pulse 64   Temp 97.5  F (36.4  C) (Tympanic)    "Resp 16   Ht 1.715 m (5' 7.5\")   Wt 74.1 kg (163 lb 6.4 oz)   BMI 25.21 kg/m      General Appearance:   Appears well   Heart & CV:  RRR no murmur.  Intact distal pulses, good cap refill.  LUNGS:  CTA B/L, no wheezing or crackles.  Abd:  Soft, flat, non tender.   Ext: No deformity.       ADDITIONAL COMMENTS:      CONSULTATION ASSESSMENT AND PLAN:    69 year old male with odynophagia and weight loss. Pt has lost some weight , but at this point is not a good candidate for a feeding tube.  If he requires one due to continued dehydration/weight loss and is on therapy, my preference would be a surgical (laparoscopic G tube).  This would be due to likely poor healing and possibility of leak and peritonitis with a PEG.      - Plan for EGD to look for stricture/ulcerations/esophagitis or PUD and treat accordingly.   - Lap G tube if needed at some point.     Nitin Quiros MD on 2/26/2020 at 9:17 AM      "

## 2020-02-26 NOTE — PROGRESS NOTES
GENERAL SURGERY CONSULTATION NOTE    Babar Laboy   [address]  [facility] [address]  69 year old  male  Admission Date/Time: No admission date for patient encounter.  Primary Care Provider:  Mario Grady    I was asked to see this patient by Dr. Hamilton for evaluation of dysphagia/epigastric/substernal pain .     HPI: Babar Laboy is a 69 year old male with metastatic lung cancer who has started radiation and chemo. He has developed pain with swallowing that is worse with cold liquids. He is able to swallow meat has avoided some breads. Pt has lost some weight but stabilized. Pt is a month out from his last radiation. Sounds like chemotherapy will be ongoing. Carafate does not seem to make a difference.  Magic mouthwash provides relief.  No black stools. No hematemesis.     REVIEW OF SYSTEMS:    GENERAL: No fevers or chills. Denies fatigue, recent weight loss.  HEENT: No sinus drainage. No changes with vision or hearing. No difficulty swallowing.   LYMPHATICS:  No swollen nodes in axilla, neck or groin.  CARDIOVASCULAR: Denies chest pain, palpitations and dyspnea on exertion.  PULMONARY: No shortness of breath or cough. No increase in sputum production.  GI: Denies melena, bright red blood in stools. No hematemesis. No constipation or diarrhea.  : No dysuria or hematuria.  SKIN: No recent rashes or ulcers.   HEMATOLOGY:  No history of easy bruising or bleeding.  ENDOCRINE:  Nohistory of diabetes or thyroid problems.  NEUROLOGY:  No history of seizures or headaches. No motor or sensory changes.        Patient Active Problem List   Diagnosis     Acne rosacea     H/O adenomatous polyp of colon     Mixed hyperlipidemia     Benign essential hypertension     Paroxysmal atrial fibrillation (H)     New onset a-fib 7/1/2019     Abnormal stress test on 7/18/2019     Tobacco abuse     Tobacco abuse counseling     Ascending aortic aneurysm (H)     Asymmetric septal hypertrophy (H)     On continuous oral  anticoagulation     NESSA (obstructive sleep apnea)     Malignant neoplasm of hilus of left lung (H)       Past Medical History:   Diagnosis Date     Atrial fibrillation (H)      Encounter for general adult medical examination without abnormal findings     No Comments Provided     Essential (primary) hypertension     No Comments Provided     Other microscopic hematuria     No Comments Provided     Other problems related to lifestyle     4-5 beers/day     Residual hemorrhoidal skin tags     12/05,noted on colonoscopy     Rosacea     12/1/2011     Tobacco use     No Comments Provided     Unilateral inguinal hernia without obstruction or gangrene     12/1/2011       Past Surgical History:   Procedure Laterality Date     COLONOSCOPY      12/05,F/U 2015     COLONOSCOPY  04/25/2016 4/25/16,F/U 2021 tubular adenomas     INSERT PORT VASCULAR ACCESS Right 12/23/2019    Procedure: INSERTION, VASCULAR ACCESS PORT;  Surgeon: Nitin Quiros MD;  Location: [facility] OR     OTHER SURGICAL HISTORY      [id number],OTHER     OTHER SURGICAL HISTORY      [id number],OTHER     OTHER SURGICAL HISTORY      1/29/13,,HERNIA REPAIR,Right,RIH with mesh     RELEASE CARPAL TUNNEL      2003,right       Family History   Problem Relation Age of Onset     Hypertension Father      Heart Disease Father      Myocardial Infarction Father      Cerebrovascular Disease Father      Hypertension Other         Hypertension,Multiple family members     Pancreatic Cancer Brother      Colon Cancer No family hx of      Prostate Cancer No family hx of        Social History     Social History Narrative    .  Two sons living at home.  He had a daughter in her early 20's who was killed in a motor vehicle accident in 2004.      Wife works outside the home as well.      Patient is now retired from [employer].    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 [employer]     Comment: retired   Social Needs     Financial resource strain: Not on file     Food insecurity:     Worry: Not on file     Inability: Not on file     Transportation needs:     Medical: Not on file     Non-medical: Not on file   Tobacco Use     Smoking status: Former Smoker     Packs/day: 0.50     Years: 42.00     Pack years: 21.00     Types: Cigarettes     Last attempt to quit: 12/3/2019     Years since quittin.2     Smokeless tobacco: Never Used   Substance and Sexual Activity     Alcohol use: Not Currently     Comment: quit 2 weeks ago     Drug use: No     Sexual activity: Yes     Partners: Female   Lifestyle     Physical activity:     Days per week: Not on file     Minutes per session: Not on file     Stress: Not on file   Relationships     Social connections:     Talks on phone: Not on file     Gets together: Not on file     Attends Sabianist service: Not on file     Active member of club or organization: Not on file     Attends meetings of clubs or organizations: Not on file     Relationship status: Not on file     Intimate partner violence:     Fear of current or ex partner: Not on file     Emotionally abused: Not on file     Physically abused: Not on file     Forced sexual activity: Not on file   Other Topics Concern     Parent/sibling w/ CABG, MI or angioplasty before 65F 55M? Not Asked   Social History Narrative    .  Two sons living at home.  He had a daughter in her early 20's who was killed in a motor vehicle accident in .      Wife works outside the home as well.      Patient is now retired from [employer].    **pre upload 01/10/2013**       acetaminophen (TYLENOL) 500 MG tablet, Take 1,000 mg by mouth every 6 hours as needed for mild pain  apixaban ANTICOAGULANT (ELIQUIS) 5 MG tablet, Take 1 tablet (5 mg) by mouth  2 times daily  atenolol (TENORMIN) 50 MG tablet, Take 1 tablet (50 mg) by mouth daily Dose increase  atorvastatin (LIPITOR) 10 MG tablet, Take 1 tablet (10 mg) by mouth daily  docusate sodium (COLACE) 100 MG capsule, Take 100 mg by mouth as needed for constipation  esomeprazole 40 MG PO DR capsule, Take 1 capsule (40 mg) by mouth every morning (before breakfast) Take 30-60 minutes before eating.  gabapentin (NEURONTIN) 300 MG capsule, Take 1 capsule (300 mg) by mouth 3 times daily  glutamine 500 MG TABS, Take 500 mg by mouth daily  HEMP OIL OR EXTRACT OR OTHER CBD CANNABINOID, NOT MEDICAL CANNABIS,,   lidocaine (XYLOCAINE) 2 % solution,   lidocaine-prilocaine (EMLA) 2.5-2.5 % external cream, Apply to port site 1 hour prior to needlesticks  LORazepam (ATIVAN) 0.5 MG tablet, Take 1 tablet (0.5 mg) by mouth every 4 hours as needed (Anxiety, Nausea/Vomiting or Sleep)  losartan (COZAAR) 25 MG tablet, Take 1 tablet (25 mg) by mouth daily  magic mouthwash (ENTER INGREDIENTS IN COMMENTS) suspension, Swish, gargle, and swallow one to two teaspoonfuls every six hours as needed. May be swallowed if esophageal involvement.  NONFORMULARY, Take 1 capsule by mouth daily Probiotic Colon Supplement  oxyCODONE 5 MG PO tablet, Take 1 tablet (5 mg) by mouth every 6 hours as needed for pain  potassium chloride ER (K-TAB/KLOR-CON) 10 MEQ CR tablet, Take 2 tablets (20 mEq) by mouth daily  prochlorperazine (COMPAZINE) 10 MG tablet, Take 1 tablet (10 mg) by mouth every 6 hours as needed (Nausea/Vomiting)  sucralfate 1 GM/10ML PO suspension, Take 10 mLs (1 g) by mouth 4 times daily  vitamin B6 (PYRIDOXINE) 100 MG tablet, Take 1 tablet (100 mg) by mouth daily    No current facility-administered medications on file prior to visit.         ALLERGIES/SENSITIVITIES: No Known Allergies    PHYSICAL EXAM:     /62 (BP Location: Right arm, Patient Position: Sitting, Cuff Size: Adult Regular)   Pulse 64   Temp 97.5  F (36.4  C) (Tympanic)    "Resp 16   Ht 1.715 m (5' 7.5\")   Wt 74.1 kg (163 lb 6.4 oz)   BMI 25.21 kg/m      General Appearance:   Appears well   Heart & CV:  RRR no murmur.  Intact distal pulses, good cap refill.  LUNGS:  CTA B/L, no wheezing or crackles.  Abd:  Soft, flat, non tender.   Ext: No deformity.       ADDITIONAL COMMENTS:      CONSULTATION ASSESSMENT AND PLAN:    69 year old male with odynophagia and weight loss. Pt has lost some weight , but at this point is not a good candidate for a feeding tube.  If he requires one due to continued dehydration/weight loss and is on therapy, my preference would be a surgical (laparoscopic G tube).  This would be due to likely poor healing and possibility of leak and peritonitis with a PEG.      - Plan for EGD to look for stricture/ulcerations/esophagitis or PUD and treat accordingly.   - Lap G tube if needed at some point.     Nitin Quiros MD on 2/26/2020 at 9:17 AM      "

## 2020-02-27 NOTE — TELEPHONE ENCOUNTER
Call from Patient wife who aftering verifying patients name and date of birth, states this patient has not had a bowel movement since Sunday after the ER. Informed them of laxative protocol which includes taking two senna-s at bedtime and then if no bowel movement by morning taking two more senna-s after breakfast, then if no bowel movement by that evening increasing senna-s to three tablets that evening then if still no bowel movement three tablets after breakfast the following morning. Discussed adequate food and fluid intake and she feels his appetite is increasing. Also discussed with dr. hardy and he states that if the senna protocol does not work to try miralax once daily. Patients wife voiced understanding. No further questions.     Cris Toussaint RN on 2/27/2020 at 10:04 AM

## 2020-03-02 NOTE — ANESTHESIA POSTPROCEDURE EVALUATION
Patient: Babar Laboy    Procedure(s):  ESOPHAGOGASTRODUODENOSCOPY, WITH Possible  DILATION    Diagnosis:Dysphagia [R13.10]  Epigastric pain [R10.13]  Diagnosis Additional Information: No value filed.    Anesthesia Type:  MAC    Note:  Anesthesia Post Evaluation    Patient location during evaluation: Bedside  Patient participation: Able to fully participate in evaluation  Level of consciousness: awake and alert  Pain management: adequate  Airway patency: patent  Cardiovascular status: acceptable  Respiratory status: acceptable  Hydration status: acceptable  PONV: none     Anesthetic complications: None          Last vitals:  Vitals:    03/02/20 0745 03/02/20 0800 03/02/20 0830   BP:  (!) 173/104 (!) 170/103   Pulse: 58     Resp:      Temp:      SpO2: 98%           Electronically Signed By: BEBETO Pimentel CRNA  March 2, 2020  10:51 AM

## 2020-03-02 NOTE — ANESTHESIA PREPROCEDURE EVALUATION
Anesthesia Pre-Procedure Evaluation    Patient: Babar Laboy   MRN: [id number] : 1950          Preoperative Diagnosis: Dysphagia [R13.10]  Epigastric pain [R10.13]    Procedure(s):  ESOPHAGOGASTRODUODENOSCOPY, WITH Possible  DILATION    Past Medical History:   Diagnosis Date     Atrial fibrillation (H)      Encounter for general adult medical examination without abnormal findings     No Comments Provided     Essential (primary) hypertension     No Comments Provided     Other microscopic hematuria     No Comments Provided     Other problems related to lifestyle     4-5 beers/day     Residual hemorrhoidal skin tags     ,noted on colonoscopy     Rosacea     2011     Tobacco use     No Comments Provided     Unilateral inguinal hernia without obstruction or gangrene     2011     Past Surgical History:   Procedure Laterality Date     COLONOSCOPY      ,F/U      COLONOSCOPY  2016,F/U  tubular adenomas     INSERT PORT VASCULAR ACCESS Right 2019    Procedure: INSERTION, VASCULAR ACCESS PORT;  Surgeon: Nitin Quiros MD;  Location: [facility] OR     OTHER SURGICAL HISTORY      [id number],OTHER     OTHER SURGICAL HISTORY      030497,OTHER     OTHER SURGICAL HISTORY      13,,HERNIA REPAIR,Right,RIH with mesh     RELEASE CARPAL TUNNEL      ,right       Anesthesia Evaluation     . Pt has had prior anesthetic.            ROS/MED HX    ENT/Pulmonary:     (+)sleep apnea, , . .    Neurologic:  - neg neurologic ROS     Cardiovascular:     (+) hypertension----. : . . . :. dysrhythmias a-fib, .       METS/Exercise Tolerance:  3 - Able to walk 1-2 blocks without stopping   Hematologic:         Musculoskeletal:  - neg musculoskeletal ROS       GI/Hepatic: Comment: Dysaphia         Renal/Genitourinary:  - ROS Renal section negative       Endo:  - neg endo ROS       Psychiatric:  - neg psychiatric ROS       Infectious Disease:  - neg infectious disease ROS       Malignancy:  "  (+) Malignancy History of Lung  Malignant neoplasm of hilus of left lung         Other:    - neg other ROS                      Physical Exam  Normal systems: pulmonary    Airway   Mallampati: III  TM distance: >3 FB  Neck ROM: full    Dental   (+) chipped and missing    Cardiovascular   Rhythm and rate: regular and normal      Pulmonary    breath sounds clear to auscultation            Lab Results   Component Value Date    WBC 2.2 (L) 02/28/2020    HGB 9.4 (L) 02/28/2020    HCT 26.9 (L) 02/28/2020     02/28/2020     02/28/2020    POTASSIUM 4.1 02/28/2020    CHLORIDE 103 02/28/2020    CO2 25 02/28/2020    BUN 11 02/28/2020    CR 0.66 (L) 02/28/2020     (H) 02/28/2020    DONELL 8.8 02/28/2020    MAG 1.9 02/28/2020    ALBUMIN 3.6 02/28/2020    PROTTOTAL 6.2 (L) 02/28/2020    ALT 35 02/28/2020    AST 25 02/28/2020    ALKPHOS 108 (H) 02/28/2020    BILITOTAL 0.4 02/28/2020    LIPASE 21 02/23/2020    PTT 37 12/03/2019    INR 1.20 12/03/2019       Preop Vitals  BP Readings from Last 3 Encounters:   02/25/20 122/62   02/23/20 (!) 163/97   02/21/20 (!) 148/84    Pulse Readings from Last 3 Encounters:   02/25/20 64   02/23/20 60   02/21/20 64      Resp Readings from Last 3 Encounters:   02/25/20 16   02/23/20 18   02/21/20 18    SpO2 Readings from Last 3 Encounters:   02/23/20 99%   01/22/20 98%   01/13/20 97%      Temp Readings from Last 1 Encounters:   02/25/20 97.5  F (36.4  C) (Tympanic)    Ht Readings from Last 1 Encounters:   02/25/20 1.715 m (5' 7.5\")      Wt Readings from Last 1 Encounters:   02/25/20 74.1 kg (163 lb 6.4 oz)    Estimated body mass index is 25.21 kg/m  as calculated from the following:    Height as of 2/25/20: 1.715 m (5' 7.5\").    Weight as of 2/25/20: 74.1 kg (163 lb 6.4 oz).       Anesthesia Plan      History & Physical Review      ASA Status:  3 .    NPO Status:  > 8 hours    Plan for MAC Reason for MAC:  Chronic cardiopulmonary disease (G9)         Postoperative " Care      Consents  Anesthetic plan, risks, benefits and alternatives discussed with:  Patient..                 David Kellerman, APRN CRNA

## 2020-03-02 NOTE — OP NOTE
PROCEDURE NOTE    SURGEON:Nitin Quiros MD    PRE-OP DIAGNOSIS:   Dysphagia, (odydnophagia) weight loss      POST-OP DIAGNOSIS: Possible Candidiasis, Mid esophageal ulceration, Hiatal hernia, Normal stomach    PROCEDURE PLANNED:   Diagnostic EGD, flexible        PROCEDURE PERFORMED:  EGD with biopsy, flexible    SPECIMEN:   ID Type Source Tests Collected by Time Destination   A :  Biopsy Small Intestine, Duodenum SURGICAL PATHOLOGY EXAM Nitin Quiros MD 3/2/2020  7:30 AM    B :  Biopsy Stomach, Antrum SURGICAL PATHOLOGY EXAM Nitin Quiros MD 3/2/2020  7:33 AM    C :  Biopsy Esophagus, Distal SURGICAL PATHOLOGY EXAM Nitin Quiros MD 3/2/2020  7:33 AM    D :  Biopsy Esophagus, middle SURGICAL PATHOLOGY EXAM Nitin Quiros MD 3/2/2020  7:34 AM            ANESTHESIA: Monitor Anesthesia Care  Coverage requested due to:ASA III  CRNA Independent: Kellerman, David, APRN CRNA      ESTIMATED BLOOD LOSS: None    COMPLICATIONS:  None    INDICATION FOR THE PROCEDURE:The patient is a 69 year oldmale. The patient presents with odynophagia that started while getting radiation for lung Ca.  He has associated wt loss. I explained to the patient the risks, benefits and alternatives to diagnostic EGD for evaluating dysphagia/wt loss. We specifically discussed the risks of bleeding, infection, perforation and the risks of sedation. The patient's questions were answered and the patient wished to proceed. Informed consent paperwork was completed.    PROCEDURE: The patient was taken to the endoscopy suite. Appropriate monitors were attached. The patient was placed in the left lateral decubitus position. Bite block was positioned. Timeout was performed confirming the patient's identity and procedure to be performed. After appropriate sedation was confirmed, the flexible endoscope was advanced into the oropharynx. The posterior oropharynx appeared grossly normal. The scope was advanced into the proximal esophagus. The esophagus  was insufflated with air. The scope was advanced under direct visualization. No acute abnormalities of the esophagus were noted. The scope was advanced into the stomach. The scope was advanced through the pylorus into the duodenal bulb. The bulb and distal duodenum appeared grossly normal.  The scope was withdrawn back into the stomach. Antral biopsy was obtained and sent to pathology. The scope was retroflexed and the GE junction inspected. No abnormalities were noted. The scope was returned to a neutral position and the stomach was decompressed. The scope was withdrawn to the GE junction and biopsy obtained. There was a 3 cm hiatal hernia. The area of ulceration started at 33 and went to 30 cm. There was fragments vs monomial esophagitis above this area. The mucosa of the esophagus was inspected while withdrawing the scope. No abnormalities were noted. The scope was withdrawn from the patient. The bite block was removed. The patient tolerated the procedure with no immediately apparent complication. The patient was taken to recovery instable condition.    FOLLOW UP:  RECOMMENDATIONS Trial nystatin, Repeat EGD if dysphagia worsens.     Nitin Quiros MD on 3/2/2020 at 7:41 AM

## 2020-03-02 NOTE — INTERVAL H&P NOTE
I saw and examined Babar Laboy.  I have reviewed the history and physical and find no changes to the patient's medical status or condition with the exceptions noted below.     Nitin Quiros MD   7:16 AM 3/2/2020

## 2020-03-02 NOTE — ANESTHESIA CARE TRANSFER NOTE
Patient: Babar Laboy    Procedure(s):  ESOPHAGOGASTRODUODENOSCOPY, WITH Possible  DILATION    Diagnosis: Dysphagia [R13.10]  Epigastric pain [R10.13]  Diagnosis Additional Information: No value filed.    Anesthesia Type:   MAC     Note:  Airway :Nasal Cannula (Pateint awake and spont. breathing well. Pateint RR 22. Pateint transported on O2 @ 2 L/min via nasal cannula.. Patient cont. on O2 therapy via nasal cannula.)  Patient transferred to:Phase II  Handoff Report: Identifed the Patient, Identified the Reponsible Provider, Reviewed the pertinent medical history, Discussed the surgical course, Reviewed Intra-OP anesthesia mangement and issues during anesthesia, Set expectations for post-procedure period and Allowed opportunity for questions and acknowledgement of understanding      Vitals: (Last set prior to Anesthesia Care Transfer)    CRNA VITALS  3/2/2020 0710 - 3/2/2020 0747      3/2/2020             Resp Rate (set):  10                Electronically Signed By: David Kellerman, APRN CRNA  March 2, 2020  7:47 AM

## 2020-03-09 NOTE — PROGRESS NOTES
Infusion Nursing Note:  Babar Laboy presents today for carbo/etoposide/tecentriq cycle 4 day 1.    Patient seen by provider today: No   present during visit today: Not Applicable.    Note: N/A.    Intravenous Access:  Labs drawn without difficulty.  Implanted Port.    Treatment Conditions:  Lab Results   Component Value Date    HGB 8.8 03/09/2020     Lab Results   Component Value Date    WBC 4.1 03/09/2020      Lab Results   Component Value Date    ANEU 2.0 03/09/2020     Lab Results   Component Value Date     03/09/2020      Lab Results   Component Value Date     03/09/2020                   Lab Results   Component Value Date    POTASSIUM 3.4 03/09/2020           Lab Results   Component Value Date    MAG 1.9 02/28/2020            Lab Results   Component Value Date    CR 0.73 03/09/2020                   Lab Results   Component Value Date    DONELL 9.1 03/09/2020                Lab Results   Component Value Date    BILITOTAL 0.3 03/09/2020           Lab Results   Component Value Date    ALBUMIN 3.5 03/09/2020                    Lab Results   Component Value Date    ALT 13 03/09/2020           Lab Results   Component Value Date    AST 13 03/09/2020       Results reviewed, labs MET treatment parameters, ok to proceed with treatment.      Post Infusion Assessment:  Patient tolerated infusion without incident.  Blood return noted pre and post infusion.  Site patent and intact, free from redness, edema or discomfort.  No evidence of extravasations.       Discharge Plan:   Discharge instructions reviewed with: Patient and Family.  Patient and/or family verbalized understanding of discharge instructions and all questions answered.  Copy of AVS reviewed with patient and/or family.  Patient will return tomorrow for next appointment.  Patient discharged in stable condition accompanied by: wife.  Departure Mode: Ambulatory.    Jean S. Hammann, RN

## 2020-03-09 NOTE — PATIENT INSTRUCTIONS
EDUCATION POST BIOLOGICAL/CHEMOTHERAPY INFUSION  Call the triage nurse at your clinic or seek medical attention if you have chills and/or temperature greater than or equal to 100.5, uncontrolled nausea/vomiting, diarrhea, constipation, dizziness, shortness of breath, chest pain, heart palpitations, weakness or any other new or concerning symptoms, questions or concerns.  You can not have any live virus vaccines prior to or during treatment or up to 6 months post infusion.  If you have an upcoming surgery, medical procedure or dental procedure during treatment, this should be discussed with your ordering physician and your surgeon/dentist.  If you are having any concerning symptom, if you are unsure if you should get your next infusion or wish to speak to a provider before your next infusion, please call your care coordinator or triage nurse at your clinic to notify them so we can adequately serve you.      Return Monday 3/16/2020 for a lab draw.

## 2020-03-10 NOTE — PROGRESS NOTES
Infusion Nursing Note:  Babar Laboy presents today for day 2 etoposide.    Patient seen by provider today: No   present during visit today: Not Applicable.    Note: N/A.    Intravenous Access:  Implanted Port.    Treatment Conditions:  Not Applicable.      Post Infusion Assessment:  Patient tolerated infusion without incident.  Blood return noted pre and post infusion.  Site patent and intact, free from redness, edema or discomfort.  No evidence of extravasations.       Discharge Plan:   Patient and/or family verbalized understanding of discharge instructions and all questions answered.  Patient discharged in stable condition accompanied by: wife.  Departure Mode: Ambulatory returns tomorrow for day 3 declines AVS today.    Jean S. Hammann, RN

## 2020-03-10 NOTE — ADDENDUM NOTE
Encounter addended by: Hammann, Jean S., RN on: 3/10/2020 2:01 PM   Actions taken: Flowsheet accepted

## 2020-03-11 NOTE — NURSING NOTE
Patient is being seen by the provider in infusion where all required infusion department rooming assessments, screenings, and vital signs were done by infusion RN.   Zaria Man RN...........3/11/2020 9:03 AM

## 2020-03-11 NOTE — PROGRESS NOTES
Visit Date:   03/11/2020      HISTORY OF PRESENT ILLNESS:  Mr. Laboy is seen on an emergent basis to discuss results of EGD.  For details, see previous notes.  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  For details, see previous notes.  The patient had received 2 cycles of chemotherapy with carboplatin, etoposide, and atezolizumab, as well as radiation therapy, palliative to T8, as well as left hilar region.  He received a total of 6000 cGy over 20 elapsed days.  He completed this on 01/29.  The patient was found to have esophagitis clinically and was placed on Magic Mouthwash and oxycodone.  Apparently he had called Dr. Kelly of Radiation Oncology with complaints of worsening esophagitis.  He recommended taking oxycodone 5 mg q.6 hours p.r.n. and Tylenol as needed as well as Magic mouthwash 4 times a day.  He was seen on 02/07 with complaints of worsening dysphagia and  odynophagia.  He was primarily drinking liquids, could only tolerate ramen noodles.  He had lost some weight.  When we saw the patient 2/7 we felt he was dehydrated with esophagitis.  We recommended 2 liters of normal saline as well as potassium replacement as well as initiate Neulasta 6 mg subcu to prevent neutropenia and then add Neulasta to his regimen.  The patient completed 3 cycles of carboplatin, etoposide, atezolizumab and had ongoing symptoms, we elected that the patient be seen for EGD.  The patient was found to have an esophageal ulcer.  He was treated with nystatin as well as continued Carafate.  The patient states today he is here for day 3 of carboplatin, etoposide and atezolizumab.  He is feeling much better and is able to swallow most foods.  He is not having any odynophagia, so essentially it has resolved, he says.  Otherwise, no other complaints, fevers, night sweats, weight loss, headaches, bone pain.      PHYSICAL EXAMINATION:   GENERAL:  He is a middle-aged white male in no  acute distress.   VITAL SIGNS:  Stable.  He is afebrile.   HEENT:  Atraumatic, normocephalic.  Oropharynx is nonerythematous.   NECK:  Supple.   LUNGS:  Clear to auscultation and percussion.   HEART:  Regular rhythm, S1, S2 normal.   ABDOMEN:  Soft, normoactive bowel sounds.  No mass, nontender.   LYMPHATICS:  No cervical, supraclavicular, axillary, or inguinal nodes.   EXTREMITIES:  No edema.   NEUROLOGIC:  Nonfocal.      LABORATORY DATA:  Laboratories reveal CBC with white count 4.1, H and H 8.8 and 26.1, platelet count is 336, potassium 3.4.  LFTs are normal.  TSH is 1.35.      IMPRESSION:  Extensive stage small cell lung carcinoma with asymptomatic brain metastases.  The patient was deemed a candidate for extensive stage disease treatment with carboplatin, etoposide, and atezolizumab.  The plan was to proceed with 4 cycles, then restage with MRI of the brain as well as PET scan.  Initial PET scan revealed that there was a 5.8 cm primary, left hilar small cell carcinoma as well as subcarinal and right thoracic inlet and right upper abdominal metastatic lymphadenopathy as well as metastatic right adrenal nodule as well as T8 metastases.  The patient was seen by Radiation Oncology and was deemed a candidate for palliative radiation to T8 as well as primary left hilar mass.  The patient went on to complete radiation therapy.  He completed carboplatin, etoposide, atezolizumab.  Course complicated by esophagitis, dehydration, hyponatremia, hypokalemia as well as neutropenia requiring IV hydration and potassium replacement as well as neutropenia requiring Neulasta.  The patient now has completed 4 cycles.  Recent EGD indicated esophageal ulcer, which has been treated with   1.  Nystatin and Carafate.  Symptoms have essentially resolved.  The patient is receiving IV fluids today.  Otherwise, the plan is to see the patient prior to cycle 5 of therapy with PET scan and MRI of the brain.      Forty minutes was spent with  patient, greater than half the time was spent in counseling and coordination of care.         ALANA NANCE MD             D: 2020   T: 2020   MT: MANI      Name:     NINA GODINEZ   MRN:      [id number]        Account:      [id number]   :      1950           Visit Date:   2020      Document: [id number]       cc: Nina Grady MD

## 2020-03-11 NOTE — PROGRESS NOTES
Infusion Nursing Note:  Babar Laboy presents today for day 3 etoposide.    Patient seen by provider today: No   present during visit today: Not Applicable.    Note: N/A.    Intravenous Access:  Implanted Port.    Treatment Conditions:  Not Applicable.      Post Infusion Assessment:  Patient tolerated infusion without incident.  Blood return noted pre and post infusion.  Site patent and intact, free from redness, edema or discomfort.  No evidence of extravasations.  Access discontinued per protocol.       Discharge Plan:   Discharge instructions reviewed with: Patient and wife.  Patient and/or family verbalized understanding of discharge instructions and all questions answered.  Patient discharged in stable condition accompanied by: wife.  Departure Mode: Ambulatory.    Jean S. Hammann, RN

## 2020-03-18 NOTE — TELEPHONE ENCOUNTER
Gabapentin       Last Written Prescription Date:  1/20/2020  Last Fill Quantity: 90,   # refills: 1  Last Office Visit: 3/11/2020 with oncology   Future Office visit:    Next 5 appointments (look out 90 days)    Mar 26, 2020  2:00 PM CDT  Return Visit with Jeff Hamilton MD  [facility] ([facility] [address]  [place] [address]  [phone]   May 07, 2020 10:45 AM CDT  Return Visit with Luis Christianson DO  [facility] ([facility] [address]  [place] [address]  [phone]

## 2020-03-24 NOTE — TELEPHONE ENCOUNTER
Spoke with Rosalia by phone to get update. Patient is doing better, able to open bottles, button shirts, use belt, Shoulder ROM is improving. Doing HEP. Advised starting wall push ups to further his upper body strength. Advised to call PT with any questions or need for further direction.

## 2020-03-26 NOTE — PROGRESS NOTES
"No complaints of pain.  Babar Laboy is a 69 year old male who is being evaluated via a billable telephone visit.  Pt consented to virtual visit.    The patient has been notified of following:     \"This telephone visit will be conducted via a call between you and your physician/provider. We have found that certain health care needs can be provided without the need for a physical exam.  This service lets us provide the care you need with a short phone conversation.  If a prescription is necessary we can send it directly to your pharmacy.  If lab work is needed we can place an order for that and you can then stop by our lab to have the test done at a later time.    If during the course of the call the physician/provider feels a telephone visit is not appropriate, you will not be charged for this service.\"     Babar Laboy complains of    Chief Complaint   Patient presents with     RECHECK     lung cancer       I have reviewed and updated the patient's Past Medical History, Social History, Family History and Medication List.    ALLERGIES  Patient has no known allergies.    Additional provider notes: note dictated    Assessment/Plan:      Phone call duration: 25 minutes       "

## 2020-03-26 NOTE — PROGRESS NOTES
Visit Date:   03/26/2020      HEMATOLOGY/ONCOLOGY CLINIC NOTE/VIRTUAL VISIT (TELEPHONE VISIT)      This is a telephone visit due to the Covid-19 pandemic.        HISTORY OF PRESENT ILLNESS:  Mr. Laboy returns for followup of extensive stage small cell lung carcinoma with brain metastases.  We had seen the patient in consultation at the request of Dr. Whalen and Dr. Grady back on 12/20/2019.  At that time, he was a 69-year-old white male with a history of tobacco abuse, obstructive sleep apnea, and atrial fibrillation whom we were asked to evaluate concerning new diagnosis of extensive stage small cell lung carcinoma with brain metastases.  Apparently, he had presented to the Emergency Room on 12/03 with complaints of left-sided abdominal/flank pain that radiated to his left shoulder blade.  A CAT scan of the chest was performed and revealed a 4.3 cm large left hilar mass with extrinsic compression of the lingular subsegment along with a large subcarinal lymph node.  The patient subsequently was referred to Dr. Alicea of Pulmonary at [facility], who saw the patient on 12/12 and recommended bronchoscopy with EBUS under general anesthesia.  This was performed by Dr. Whalen of Pathology who performed the bronchoscopy and transbronchial biopsy on 12/13/2019.  Pathology revealed the patient had a transbronchial biopsy that was consistent with small cell lung carcinoma.  Mediastinal mass was also positive for small cell carcinoma.  Washings were suspicious for small cell carcinoma, as well as transbronchial biopsy of mediastinal masses, also suspicious for small cell carcinoma.  The patient was scheduled to have a PET scan, but his MRI of the brain was performed and was noted to have brain metastases with 14 separate enhancing lesions noted in the brain, largest measuring 9 mm in the left occipital lobe.  This was consistent with disseminated brain metastatic disease.  When we saw the patient, we noted he was a  [id number]-avms smoker.  He had left-sided posterior and anterior rib cage pain, and we recommended the patient be treated for extensive stage small cell lung carcinoma with asymptomatic brain mets.  PET scan was obtained on 12/27, and the findings were that there was a 5.8 cm primary left hilar small cell lung cancer involving the upper and lower lobes, subcarinal right thoracic inlet upper abdominal lymphadenopathy and metastatic right adrenal nodule.  There were transosseous metastases at T8, extending into the ventral epidural space.  We recommended the patient be started immediately with carboplatin, etoposide, and atezolizumab with carboplatin given AUC of 5 on day 1, etoposide 100 mg/m2 on day 1, 2 and 3 and atezolizumab 1200 mg IV on day 1.  We thought he had a near complete response, and we would proceed with whole brain radiation therapy followed with maintenance atezolizumab.  We also referred the patient to Radiation Oncology as the patient was having back pain, and it was decided to treat the patient with palliative radiation to the T8 vertebral bony metastases, as well as the left hilar region mass due to the airway compromise.  He was given 3600 cGy to the T8 and the left hilar region.  Radiation was started on 01/19 and completed on 01/29/2020.  He did have problems with esophagitis and required symptomatic relief with Magic Mouthwash and oxycodone.  When we say the patient on 02/07/2020, he was complaining of worsening dysphagia and odynophagia.  He was primarily drinking liquids.  He could only tolerate Tom noodles.  He has lost some weight.  When we saw the patient on 02/07, we thought he was dehydrated with esophagitis.  We recommended 2 liters of normal saline, as well as potassium replacement, as well as initiating Neulasta 6 mg subQ to prevent neutropenia and then add Neulasta to his regimen.  The patient completed 3 cycles of carboplatin, etoposide, atezolizumab and had ongoing symptoms.  We  would like to have the patient be seen for EGD.  The patient was found to have an esophageal ulcer.  He was treated with Nystatin, as well as continued Carafate.  He felt much better when we saw him on 03/11.  He completed 4 cycles of carboplatin, etoposide, atezolizumab and had staging studies.  On PET scan, he had essentially a complete response with resolution of all disease.  There was a left infrahilar mass.  It now was seen as a small area of scarring.  There was no evidence of metastases.  The transosseous tumor T8 was not seen as bony sclerosis.  There was no evidence of adrenal mets.  He did have an MRI of the brain, which also showed excellent response with significant decrease in the number of enhancing lesions, the 14 areas of enhancement in the brain, and essentially disappeared with only 3 separate areas of hemosiderin deposition to have developed, consistent with treated lesions.  This was essentially a near complete response.  The patient otherwise is doing well, he says.  He denies any odynophagia, fevers, night sweats, weight loss, headache, cough, hemoptysis.  He is doing very well.  He is maintaining his weight.  He would be a candidate for whole brain RT/PCI with atezolizumab maintenance therapy to be given.      LABORATORY DATA:  Reveal CBC with white count of 4.3, H and H 8.5 and 25.6, platelet count is 174.  LFTs are normal.      IMPRESSION:     1.  Extensive stage small cell lung carcinoma with asymptomatic brain metastases.  The patient was deemed a candidate for extensive stage disease treated with carboplatin, etoposide, and atezolizumab.  The plan was to proceed with 4 cycles, then restage with MRI of the brain, as well as PET scan.  Initial PET scan revealed that there was a 5.8 cm primary, left hilar small cell carcinoma, as well as subcarinal, right thoracic inlet, and right upper abdominal metastatic lymphadenopathy, as well as metastatic right adrenal nodule, as well as T8  metastases.  The patient was seen by Radiation Oncology and was deemed a candidate for palliative radiation to T8, as well as primary left hilar mass.  The patient went on to complete radiation therapy.  Course was complicated by esophagitis, dehydration, hyponatremia, hypokalemia, as well as neutropenia requiring IV hydration and potassium replacement, as well as neutropenia requiring Neulasta.  The patient now has completed 4 cycles.  Recent EGD indicated esophageal ulcer, which has been treated with Nystatin Carafate.  Symptoms have resolved.  PET scan indicates essentially complete response.  MRI of the brain shows as a near complete response.  In terms of brain, we would favor proceeding with whole brain RT/prophylactic cranial radiation and proceed with maintenance atezolizumab.  Will see the patient in 6 weeks.  Obtain CBC, CMP, LDH.   2.  Anemia of chemotherapy.  We will continue to monitor hemoglobin and transfuse p.r.n.        TIME SPENT:  Twenty-five minutes was spent with this patient on this telephone virtual visit call visit.         ALANA NANCE MD             D: 2020   T: 2020   MT:       Name:     NINA GODINEZ   MRN:      [id number]        Account:      [id number]   :      1950           Visit Date:   2020      Document: [id number]       cc: Duy Grady MD

## 2020-03-30 NOTE — PROGRESS NOTES
Infusion Nursing Note:  Babar Laboy presents today for Tecentiq cycle 5.    Patient seen by provider today: No   present during visit today: Not Applicable.    Note: N/A.    Intravenous Access:  Labs drawn without difficulty.  Implanted Port.    Treatment Conditions:  Lab Results   Component Value Date    HGB 8.6 03/30/2020     Lab Results   Component Value Date    WBC 5.0 03/30/2020      Lab Results   Component Value Date    ANEU 2.7 03/30/2020     Lab Results   Component Value Date     03/30/2020      Lab Results   Component Value Date     03/30/2020                   Lab Results   Component Value Date    POTASSIUM 3.6 03/30/2020           Lab Results   Component Value Date    MAG 1.9 02/28/2020            Lab Results   Component Value Date    CR 0.78 03/30/2020                   Lab Results   Component Value Date    DONELL 8.9 03/30/2020                Lab Results   Component Value Date    BILITOTAL 0.4 03/30/2020           Lab Results   Component Value Date    ALBUMIN 3.7 03/30/2020                    Lab Results   Component Value Date    ALT 12 03/30/2020           Lab Results   Component Value Date    AST 13 03/30/2020       Results reviewed, labs MET treatment parameters, ok to proceed with treatment.      Post Infusion Assessment:  Patient tolerated infusion without incident.  Blood return noted pre and post infusion.  Site patent and intact, free from redness, edema or discomfort.  No evidence of extravasations.  Access discontinued per protocol.       Discharge Plan:   Discharge instructions reviewed with: Patient.  Patient and/or family verbalized understanding of discharge instructions and all questions answered.  Copy of AVS reviewed with patient and/or family.  Patient will return 21 days for next appointment.  Patient discharged in stable condition accompanied by: self.  Departure Mode: Ambulatory.    Jean S. Hammann, RN

## 2020-04-01 NOTE — TELEPHONE ENCOUNTER
After birth date was verified, states rash is on his arms and stomach today.  Raised and red but not itchy.  Lips are numb since 2 days ago (prior to cold med). Cold symptoms include cough, sinus congestion, and watery eyes.  Denies fever.  Did take the suggested OTC cold medication, Zyrtec yesterday afternoon and this morning.  Currently on Tecentriq.  Lizz Corral CMA (AAMA)................ 4/1/2020 9:35 AM

## 2020-04-01 NOTE — TELEPHONE ENCOUNTER
Per Jeff Hamilton MD continue Zyrtec and if rash worsens stop Zyrtec and take benadryl. If starts to itch can use hydrocortisone cream.  Zaria Man RN...........4/1/2020 10:03 AM

## 2020-04-02 NOTE — PROGRESS NOTES
Visit Date:   04/02/2020      HEMATOLOGY/ONCOLOGY CLINIC NOTE      HISTORY OF PRESENT ILLNESS:  Mr. Laboy is seen on an emergent basis.  He is currently being treated for extensive stage small cell lung carcinoma with asymptomatic brain metastases and is currently on maintenance atezolizumab.  He received his last atezolizumab dose this Monday, which was 03/30, and apparently developed a rash over the next 2 days which has progressed to the point which was involving most of his body.  He comes in to be seen for the rash.  He says it is pruritic, it is involving both upper extremities, both lower extremities, the trunk and palms of the hands.  He also had increasing neuropathy in his feet.  He says it is very painful to walk.  No complaints of fevers, night sweats, weight loss.      PHYSICAL EXAMINATION:   GENERAL:  He is a middle-aged white male in no acute distress.   VITAL SIGNS:  Blood pressure 142/84, pulse 67, respiration 14, temperature 97.9.   HEENT:  Atraumatic, normocephalic.   LUNGS:  Clear.   HEART:  Regular rate and rhythm, S1, S2 normal.   ABDOMEN:  Soft, nontender.   SKIN:  Skin reveals a diffuse maculopapular rash, almost vesicular-appearing and involving the malar area, back, trunk, both upper and lower extremities.   NEUROLOGIC:  Grossly nonfocal.      LABORATORY DATA:  Laboratories reveal CBC:  White count 6.6, H and H 9.1 and 27.9, platelet count is 287,000.  BUN is 12, creatinine 0.78.  LFTs are normal.  LDH is 225.      IMPRESSION:  Checkpoint inhibitor rash secondary to atezolizumab.  This is a grade 3 rash.  We would favor treating with IV Solu-Medrol today 125 mg IV and initiating prednisone 40 mg p.o. b.i.d. for 2 weeks.  We will see the patient in 2 weeks to assess response.  Otherwise, we will hold atezolizumab for now.  Otherwise, the patient has had a complete response to carboplatin, etoposide, atezolizumab with essentially resolution of all disease with improvement in brain metastases.   The patient will go onto whole brain RT prophylactic cranial radiation, continue with atezolizumab for now and reassess in 2 weeks.      Forty minutes was spent with patient, greater than half the time was spent in counseling and coordinating of care.         ALANA NANCE MD             D: 2020   T: 2020   MT: SINDHU      Name:     NINA GODINEZ   MRN:      [id number]        Account:      [id number]   :      1950           Visit Date:   2020      Document: [id number]       cc: Duy Grady MD

## 2020-04-02 NOTE — TELEPHONE ENCOUNTER
Pain in feet and numbness. Per Jeff Hamiltno MD continue gabapentin per Jeff Hamilton MD. Rash is worse. Stopped Zyrtec today. Took benadryl. Wife states looks like chicken pox. Advised to come in.  Zaria Man RN...........4/2/2020 8:52 AM

## 2020-04-02 NOTE — PROGRESS NOTES
Patient arrived over from oncology appointment, noted to have rash over most of body. Orders for solumedrol 125 mg IV push today and then starting on prednisone tomorrow. Treatment on hold until burst is complete. Patient will follow up with oncology before returning.  Patient's port accessed, good brisk blood return. Medication given  patient left ambulatory.

## 2020-04-03 NOTE — TELEPHONE ENCOUNTER
"S-(situation): wife , Julianne calling and states that patients BP has been going up the last 2-3 weeks.    B-(background): patient is receiving chemo and immunotherapy for lung cancer.    States back in feb. BP\"s were low so hydrochlorothiazide discontinued.  See phone note on 2/17/2020    A-(assessment): states had nose bleed yesterday and today last less than 10 min each denies much blood.  Mon 3/30 BP: 147/97 AM  Tues 3/31 BP: 171/102 AM  Wed 4/1 BP: 153/93 AM after pills BP: 159/95  Thurs. 4/2 BP: 168/102 AM after pills BP: 137/85  Friday 4/3 BP: 157/96 AM after pills BP: 155/94    Patient is being treated for rash and swollen feet as of yesterday with Prednisone and was given a \"steriod shot\" in infusion.  Wife states it does not look any better today and may be a little worse.    Denies headache , chest pain, shortness of breath or dizziness.      R-(recommendations): informed patients wife will route information to covering team let for review and consideration.    Tennille Kraus RN on 4/3/2020 at 2:38 PM    "

## 2020-04-03 NOTE — TELEPHONE ENCOUNTER
Spoke to patients wife who states he has enough of an old prescription and does not want a new script sent to the pharmacy. Patient is aware of the dosing instructions.    Hortensia Huerta LPN on 4/3/2020 at 5:09 PM

## 2020-04-03 NOTE — TELEPHONE ENCOUNTER
He has appt in Oncology on 4/16/20 with Dr Simmons and they check blood pressure at that visit.  Is a telephone visit okay to do?  Norma J. Gosselin, LPN .......  4/3/2020  2:59 PM

## 2020-04-03 NOTE — TELEPHONE ENCOUNTER
Will restart hydrochlorothiazide at 12.5 mg daily. He may take additional 12.5 mg if systolic BP is greater than 140 and/or diastolic is greater than 90.    He needs to be seen by Primary Care for follow up. Please schedule.    Order is Xiang'd up, I just don't know which pharmacy to send it to.    Ledy Durand, HOLLY

## 2020-04-06 NOTE — PROGRESS NOTES
"INITIAL PATIENT ASSESSMENT    Referring Physician: hayley  Other Physicians: Mario Grady    Diagnosis: PCI    Prior radiation therapy:   Site Treated: T8 & left lung  Facility: [facility]  Dates: completed on : 1/29/2020  Dose: 3600cGy    Prior chemotherapy:   Protocol: atezolizumab/carbo/etoposide  Facility: [facility]  Dates: current      Prior hormonal therapy:N/A    Pain Eval:  Denies    Psychosocial  Marital Status:    Spouse/Significant other:  Julianne   Children: 2   Occupation: maintenance [employer]    Retired: Yes  Living arrangements: Home with wife  Do you feel safe at home? Yes  Activity status: independent   referral needs: Not needed    Advanced Directive: No Pt request information    Patient was assessed for the influenza, pneumo-poly, prevnar 13, Tdap, and shingles immunizations. \"Vaccinations and Cancer Treatment\" flyer given.  Instructed patient to discuss vaccination status with his/her PCM.     Patient was assessed using the NCCN psychosocial distress thermometer. Patient rated the score as a 3. Patient rated current stressors as \"its just scary\". Stressors will be brought to the attention of provider or Oncology RN Care Coordinator for a score of 6 or greater or per nurses discretion.     [facility].  Educated patient on the mapping process and the possible side effects of XRT to the brain, to include: fatigue, skin reaction, hair loss, headache, nausea/vomitng.  Pt verbalizes an understanding and has no questions at this time. Pt and his wife were both on speaker phone with the writer.    Yunior Hernandez RN        .      "

## 2020-04-07 NOTE — PROGRESS NOTES
[facility]  DEPARTMENT OF RADIATION ONCOLOGY  FOLLOW-UP CONSULTATION NOTE (Virtual Visit via Video)    Name: Babar Laboy MRN: [id number]   : 1950 (69 year old)  Date of Service: 2020 Referring: Jeff Hamilton       Reason for consultation: Radiation Oncology is seeing the patient for evaluation and management of lung cancer (palliative intent).     Diagnosis and Cancer Staging  Malignant neoplasm of hilus of left lung (H)  Staging form: Lung, AJCC 8th Edition  - Clinical: Stage IV (cT4, cN3, cM1) - Signed by Duy Kelly MD on 2020      History of Present Illness   At the request of medical oncology, this very pleasant 69-year-old gentleman returns (virtually) with his wife for consultation about the role of prophylactic cranial irradiation (PCI) following palliative chemoradiation (concurrent) for symptomatic disease at T8 and the left hilar region from small cell lung cancer (36Gy in 15 fractions of 2.4Gy per day ending 2020). He then continued systemic therapy under the guidance of medical oncology. The T8 back pain improved rapidly during radiation therapy. Esophagitis that started during radiation therapy increased significantly a few days after the end of radiation therapy. He has required analgesics and dietary changes. A restaging body PET-CT on 3/18/2020 demonstrated only residual sclerosis at T8 and scarring in the left perihilar region, representing a complete response of hypermetabolic disease and no new disease. A re-staging brain MRI on 3/19/2020 demonstrated only mild residual enhancement of several smaller masses, representing a near complete resolution of multiple brain metastases (at least 14 prior masses) and no new disease compared to the brain MRI on 2019. He received completed atezolizumab on 3/30/2020 but developed a pruritic, erythematous whole body rash about 2-days later (he also complained of increasing foot neuropathy causing pain  when walking). He also developed oral lesions and a flare of esophageal pain. The skin reaction begun to resolve with systemic steroid therapy. He reports that the worst pain remains at the soles of his feet. The truncal component of his rash is improving rapidly. His next dose of checkpoint inhibitor therapy is Monday, 4/20/2020. Because of the low-burden of gross systemic disease, medical oncology recommended consideration of PCI. The patient and his wife acknowledge that his overall cognitive function and his memory have begun to decline but not severely. He continues to function at a high level, both cognitively and physically, and remains functionally independent. He denies headache, nausea, vomiting, focal weakness, focal deficits, acute hearing changes, acute vision changes, mental status changes, confusion, neck stiffness, photophobia, or changes in bowel or bladder function. He denies a history of radiation therapy or exposure beyond the thorax. He denies a history of collagen vascular diseases.    Past Medical History:  Past Medical History:   Diagnosis Date     Atrial fibrillation (H)      Encounter for general adult medical examination without abnormal findings     No Comments Provided     Essential (primary) hypertension     No Comments Provided     Other microscopic hematuria     No Comments Provided     Other problems related to lifestyle     4-5 beers/day     Residual hemorrhoidal skin tags     12/05,noted on colonoscopy     Rosacea     12/1/2011     Tobacco use     No Comments Provided     Unilateral inguinal hernia without obstruction or gangrene     12/1/2011          Past Surgical History:  Past Surgical History:   Procedure Laterality Date     COLONOSCOPY      12/05,F/U 2015     COLONOSCOPY  04/25/2016 4/25/16,F/U 2021 tubular adenomas     ESOPHAGOSCOPY, GASTROSCOPY, DUODENOSCOPY (EGD), COMBINED N/A 3/2/2020    Procedure: ESOPHAGOGASTRODUODENOSCOPY, WITH Possible  DILATION;  Surgeon: Ishaan  Nitin MATHEW MD;  Location:  OR     INSERT PORT VASCULAR ACCESS Right 12/23/2019    Procedure: INSERTION, VASCULAR ACCESS PORT;  Surgeon: Nitin Quiros MD;  Location: [facility] OR     OTHER SURGICAL HISTORY      [id number],OTHER     OTHER SURGICAL HISTORY      [id number],OTHER     OTHER SURGICAL HISTORY      1/29/13,,HERNIA REPAIR,Right,RIH with mesh     RELEASE CARPAL TUNNEL      2003,right          Chemotherapy History: Yes.  Radiation History: Yes.  Implanted Cardiac Devices: No.    Medications:  Current Outpatient Medications   Medication Sig Dispense Refill     acetaminophen (TYLENOL) 500 MG tablet Take 1,000 mg by mouth every 6 hours as needed for mild pain       apixaban ANTICOAGULANT (ELIQUIS) 5 MG tablet Take 1 tablet (5 mg) by mouth 2 times daily 60 tablet 11     atenolol (TENORMIN) 50 MG tablet Take 1 tablet (50 mg) by mouth daily Dose increase 90 tablet 3     atorvastatin (LIPITOR) 10 MG tablet Take 1 tablet (10 mg) by mouth daily 90 tablet 3     esomeprazole 40 MG PO DR capsule Take 1 capsule (40 mg) by mouth every morning (before breakfast) Take 30-60 minutes before eating. 30 capsule 3     gabapentin (NEURONTIN) 300 MG capsule Take 2 capsules (600 mg) by mouth 3 times daily 180 capsule 2     glutamine 500 MG TABS Take 500 mg by mouth daily 90 tablet 3     HEMP OIL OR EXTRACT OR OTHER CBD CANNABINOID, NOT MEDICAL CANNABIS,        hydrochlorothiazide (HYDRODIURIL) 12.5 MG tablet Take 1 tablet (12.5 mg) by mouth daily ** May take one additional tab (12.5 mg) per day if BP systolic is greater than 140 and/or diastolic greater than 90. 30 tablet 0     lidocaine-prilocaine (EMLA) 2.5-2.5 % external cream Apply to port site 1 hour prior to needlesticks 30 g 1     losartan (COZAAR) 25 MG tablet Take 1 tablet (25 mg) by mouth daily 90 tablet 3     NONFORMULARY Take 1 capsule by mouth daily Probiotic Colon Supplement       potassium chloride ER (K-TAB/KLOR-CON) 10 MEQ CR tablet Take 2 tablets (20 mEq) by mouth daily 60  tablet 1     predniSONE (DELTASONE) 20 MG tablet Take 2 tablets (40 mg) by mouth 2 times daily 84 tablet 0     prochlorperazine (COMPAZINE) 10 MG tablet Take 1 tablet (10 mg) by mouth every 6 hours as needed (Nausea/Vomiting) 30 tablet 3     sucralfate 1 GM/10ML PO suspension Take 10 mLs (1 g) by mouth 4 times daily 420 mL 3     vitamin B6 (PYRIDOXINE) 100 MG tablet Take 1 tablet (100 mg) by mouth daily 90 tablet 3     LORazepam (ATIVAN) 0.5 MG tablet Take 1 tablet (0.5 mg) by mouth every 4 hours as needed (Anxiety, Nausea/Vomiting or Sleep) (Patient not taking: Reported on 2020) 30 tablet 3     oxyCODONE 5 MG PO tablet Take 1 tablet (5 mg) by mouth every 6 hours as needed for pain (Patient not taking: Reported on 2020) 60 tablet 0         Allergies:    No Known Allergies    Social History:  Social History     Tobacco Use     Smoking status: Former Smoker     Packs/day: 0.50     Years: 42.00     Pack years: 21.00     Types: Cigarettes     Last attempt to quit: 12/3/2019     Years since quittin.3     Smokeless tobacco: Never Used   Substance Use Topics     Alcohol use: Not Currently     Comment: quit 2 weeks ago     Drug use: No         Family History:  Family History   Problem Relation Age of Onset     Hypertension Father      Heart Disease Father      Myocardial Infarction Father      Cerebrovascular Disease Father      Hypertension Other         Hypertension,Multiple family members     Pancreatic Cancer Brother      Colon Cancer No family hx of      Prostate Cancer No family hx of          Review of Systems   A 10-point review of systems was performed. Pertinent findings are noted in the HPI.    Physical Exam   KPS: 70.  ECOG Status: 2 (Ambulatory and capable of all selfcare but unable to carry out any work activities; may need help with IADLs up and about > 50% of waking hours)    Vitals:  There were no vitals taken for this visit.    Pain: 5/10    Physical Exam (Visual from video today; otherwise  "derived from medical oncology visit).  General: Appears fatigued and worn but not acutely ill.  Head: Thinning hair, no thrush.  Integument: Maculopapular rash is worst on foot, remains significant over back, and less over the chest and remaining extremities. Nearly absent on face. No gross cellulitis.  Lungs: Clear.   Heart:  Regular rate and rhythm, S1, S2 normal.   Abdomen:  Soft, nontender.   Neurologic:  Grossly nonfocal.     Imaging/Path/Labs   Imaging: See HPI  Path: See HPI  Labs: See HPI    Information Review   The patient and his wife have been notified of following: \"This video visit will be conducted via a call between you and your physician/provider. We have found that certain health care needs can be provided without the need for a physical exam. This service lets us provide the care you need with a video conversation. If a prescription is necessary we can send it directly to your pharmacy. If lab work is needed we can place an order for that and you can then stop by our lab to have the test done at a later time. If during the course of the call the physician/provider feels a video visit is not appropriate, you will not be charged for this service.\" I reviewed and updated the patient's medical, surgical, social, and family histories and the medication list. .\" Patient and his wife gave verbal consent for video visit including the potential risks and benefits of telemedicine (video visit) versus in person care; bill insurance or make self-payment for services provided; and responsibility for payment of non-covered services. As the provider I attest to compliance with applicable laws and regulations related to telemedicine. I have reviewed and updated the patient's Past Medical History, Social History, Family History and Medication List.    I counseled the patient and his wife about COVID-19 risks, precautions, and potential impact on his radiation therapy. The patient relatively felt well and denied " known exposure to COVID-19, risky behavior, or symptoms such as fever, cough, dyspnea, chest discomfort, anosmia, malaise, confusion, blue-colored lips or face, or other recent systemic or upper respiratory complaints. I reviewed the case with the patient and his wife, evaluated him, and discussed his treatment options and risks of therapy. I also reviewed the case with medical oncology. I reviewed the medical records, radiographic information, and pathologic studies. I reviewed the imaging studies via PACS. I reviewed the nursing and patient intake sheets. I reviewed the history with the patient. I offered to speak with his family members and friends today by speakerphone. The patient declined my offer but granted me permission to speak with them if they contact me or come to clinic. The patient and his wife are good historians, began the consultation with good insights into the disease process, and acknowledged its poor consequences. They educated themselves through a variety of educational media including the Internet. They demonstrated good comprehension of our discussion and explored the treatment options with good understanding. They asked pertinent questions and insightful follow-up questions. I verbally illustrated the basic anatomy and field of treatment. I explained that this clinic continues to be covered by multiple physicians and that I may not be the radiation oncologist who is present for his therapy. They declined referral for an additional opinion or conservative care. They previously accepted referral to our social work staff for additional resources including potential counseling. The patient granted me permission to exchange medical information and records with other physicians. No therapeutic radiation protocols for the patient's disease are available at our Center. Time: 1:30PM-2:00PM    Assessment/Plan   In summary, I concur with the recommendation of prophylactic cranial irradiation (PCI) for  this highly functional gentleman with a microscopic burden of disease following a clinically complete response to chemoradiation per review of PET-CT and brain MRI. I explained the reasons why GammaKnife and other forms of stereotactic brain radiation therapy are not indicated for PCI. The patient and his wife are leaning to PCI but have some hesitations because of the degree of esophageal toxicity after thoracic chemoradiation, the current systemic reaction to atezolizumab, and risk of cognitive dysfunction. They will contact our office within the next few days with their final decision.    During our extended visit, the patient, wife and I discussed his diagnosis of an incurable small cell lung cancer, its natural history of spread, patterns of failure after various treatments, and his poor prognosis even with PCI and aggressive multi-modaity treatment. We discussed factors specific to his disease such as the minimal residual volume of disease, age, recent local and systemci toxicities, likelihood that future brain metastases are likely to develop even with PCI at this time, and their desire to continue with formal oncologic palliative therapy and not pursue hospice or supportive care. We reviewed the concept of multimodality care, its evolution in the treatment of metastatic small cell lung cancer, and the relative contribution of each modality to the treatment paradigm. We discussed the potentially beneficial role of radiation therapy for microscopic brain metastases in the context of the evolving standards of care and national guidelines such as the NCCN, ACR, and AMELIE. We reviewed the indications for radiation therapy to the brain. They understand that protocol radiation therapy for hisdisease is not available at our Center.    We again reviewed the nature of external beam radiation therapy from a Ensighten TrueBeam linac. We would simulate the patient on a GE Discovery IQ PET-CT. After advanced computerized  treatment planning, we would deliver treatment with multileaf collimation and perhaps image guidance. We typically use 3D radiation therapy to provide an appropriate distribution of dose. These techniques permit good coverage of complex target tissues while maintaining adequate sparing of normal critical structures such as the eyes, parotid glands, and spinal cord. I would not use stereotactic radiation therapy, protons, TomoTherapy, brachytherapy, respiratory motion control, or radioprotectant agents. I do not feel that these methods offer a clear benefit, particularly with the doses used in the setting of PCI and the absence of complicating anatomical geometry or comorbid conditions. The patient appears to have no absolute contraindications to radiation therapy.    We discussed in detail the relative risks, benefits, rationale, potential side effects, alternatives, and adjuncts to radiation therapy for metastatic lung cancer to the brain. The side effects may be acute or chronic, and may be progressive, painful, and negatively impact the patient s long-term quality of life. They may require medical or surgical intervention.  The toxicities include but are not limited to compromises of the hair follicles, scalp, brain, optic structures, pituitary, lacrimal system, spinal cord, parotid glands, gustatory apparatus, olfactory apparatus, and other organs as well as systemic toxicities such as fatigue and long-term risks such as second malignancy. We discussed the fact the patients with treated for brain metastases are most likely to eventually experience a neurological demise from progressing or new CNS disease. In additional to radiation therapy, we discussed the fact the advancing brain disease as well as extracranial progression and systemic therapies can contribute significantly to future cognitive decline. The patient and his wife wished to proceed as noted above. We answered all questions to their satisfaction.  Thank you for allowing me to participate in the care of this very pleasant patient.       Duy Kelly MD, PhD

## 2020-04-08 NOTE — TELEPHONE ENCOUNTER
After birth date was verified, states he has a dry cough for 2 weeks.  Denies fever or other symptoms.  His appetite and energy have been good.  Took a walk yesterday and it was worse when he was back inside after.  Takes Delsym twice a day for 5 days so far per Dr Reilly in [place].  This seems to help, as did a steam shower.  Advised to watch symptoms and avoid coming in unless in distress.  Mucinex can be used in place of Delsym if the cough doesn't clear with that.  Can also use Tylenol if needed.  Any further concerns, they should call back.  Lizz Corral [facility]............... 4/8/2020 10:01 AM

## 2020-04-13 NOTE — TELEPHONE ENCOUNTER
After discussion with Darlin Sexton NP she states that she would like him to continue his mucinex, drink lots of fluids, and to monitor his temp and call back with any worsening of symptoms or a fever develops as there is a lot of different illness in the community right now. We will see him Thursday and will evaluate his rash again then also. Contacted patient and they are in agreement with this plan.     Cris Toussaint RN on 4/13/2020 at 9:32 AM

## 2020-04-13 NOTE — TELEPHONE ENCOUNTER
Patients wife called as he has some hoarseness to his voice on Saturday. He has some mucous and he has been coughing up a small amount of green/yellow mucous. He has a small amount of clear nasal drainage. He is currently taking mucinex 10ml which is when he started producing the mucous he is coughing up.  He has been outside walking. He has no other symptoms such as fever and no shortness of breath. He is taking prednisone 2 tabs twice a day. He still has a rash in some spots. He is eating constantly which they feel they may be breathing heavier a little bit due to the extra weight. Patient requested that his oncology team be notified so that he can be more proactive than reactive. Will consult with Darlin Sexton NP.     Cris Toussaint RN on 4/13/2020 at 9:25 AM

## 2020-04-15 NOTE — TELEPHONE ENCOUNTER
States that he is having increased forehead perspiration. No fever. Otherwise feels fine. Drink plenty fluids and follow up tomorrow as planned.  Zaria Man RN...........4/15/2020 2:59 PM

## 2020-04-16 NOTE — NURSING NOTE
"Chief Complaint   Patient presents with     RECHECK     Metastatic lung cancer   Patient wants to recheck his rash, complains of head sweats in the evening and at night, and a productive cough that flares when outside.    Initial /82   Pulse 82   Temp 98.2  F (36.8  C) (Oral)   Resp 20   Wt 83 kg (183 lb)   SpO2 99%   BMI 28.24 kg/m   Estimated body mass index is 28.24 kg/m  as calculated from the following:    Height as of 2/25/20: 1.715 m (5' 7.5\").    Weight as of this encounter: 83 kg (183 lb).  Medication Reconciliation: complete    Lizz Corral CMA (AAMA)  "

## 2020-04-16 NOTE — TELEPHONE ENCOUNTER
I called the patient and his wife to check on his status. Both agree that he has improved rapidly from a presumed reaction from atezolizumab as well as esophagitis from chemoradiation. I explained that I had decided to not proceed immediately with their request for PCI because of my impression that linger concerns might still be present. We again reviewed the rationale, risks, and controversies of prophylactic cranial irradiation. In response to their concerns, I explained that PCI does not cause severe mucosal pain or a severe skin reaction. We spoke about the cognitive risks of PCI and that fact that PCI can be one of several factors that can accelerate cognitive decline. We spoke about the the fact to cognitive decline would likely continue even if PCI were not administered. We spoke about its integration into atezolizumab therapy. We spoke about differences from stereotactic irradiation and that SRS is not indicated. They reaffirmed their their desire to proceed with PCI and that their second thoughts about treatment had been satisfied (they had call the department 1-2 days after our prior call to arrange simulation). We will therefore proceed with scheduling of treatment. I answered all questions to their satisfaction.

## 2020-04-16 NOTE — PROGRESS NOTES
Visit Date:   04/16/2020      HEMATOLOGY/ONCOLOGY CLINIC NOTE      HISTORY OF PRESENT ILLNESS:  Mr. Laboy is seen on an emergent basis.  For details, see previous notes.  He was just seen on 04/02/2020, at that time he was being treated for extensive stage small cell lung carcinoma with asymptomatic brain mets and was on maintenance atezolizumab.  He received his last atezolizumab dose on 03/30 and apparently developed a rash over the next 2 days, which had progressed to the point where it was involving most of his body.  It was pruritic and involved both upper extremities and lower extremities, trunk and palms of the hands.  Also, he had increasing neuropathy in his feet.  When we saw the patient, we felt he had a checkpoint inhibitor rash secondary to atezolizumab and treated him with Solu-Medrol 125 mg IV and started the patient on prednisone 40 mg p.o. b.i.d. for 2 weeks.  The patient comes in today, says his rash is almost gone.  He has a faint rash on his forearms, otherwise no complaints of fevers, night sweats, weight loss, cough.  Overall, he is doing well.  He does note that he had some weight gain with prednisone as well as increased appetite and may be some sweats.      PHYSICAL EXAMINATION:   GENERAL:  He is a middle-aged white male in no acute distress.   VITAL SIGNS:  Blood pressure 160/82, pulse 82, respirations 20, temperature 98.2.   HEENT:  Atraumatic, normocephalic.  Oropharynx nonerythematous.   NECK:  Supple.   LUNGS:  Clear to auscultation and percussion.   HEART:  Regular rhythm, S1, S2 normal.   ABDOMEN:  Soft, normoactive bowel sounds.  No mass, nontender.   LYMPHATICS:  No cervical, supraclavicular, axillary or inguinal nodes.   EXTREMITIES:  No edema.   SKIN:  a faint macular rash noted on both forearms, otherwise the rash is nearly completely resolved.      LABORATORY DATA:  Laboratories reveal CBC:  White count 17.9, H and H 11.3 and 32.8, platelet count is 262.  Total protein 6.3,  alkaline phosphatase 55, ALT 63, AST 16, total bilirubin is 0.5, .      IMPRESSION:  Checkpoint inhibitor rash secondary to atezolizumab.  Rash is nearly completely resolved.  We will start to taper prednisone, will taper to 20 mg p.o. b.i.d. for 1 week and then 20 mg daily for 1 week and then stop.  We will resume atezolizumab in 3 weeks and we will see the patient at that time.  The patient has already seen Dr. Kelly of radiation therapy and the plan is to start whole brain prophylactic cranial radiation in the near future.  We will see the patient in 3 weeks and obtain CMP, LDH, TSH.      Forty minutes was spent with the patient, greater than half the time was spent in counseling and coordination of care.         ALANA NANCE MD             D: 2020   T: 2020   MT: SINDHU      Name:     NINA GODINEZ   MRN:      [id number]        Account:      [id number]   :      1950           Visit Date:   2020      Document: [id number]       cc: Duy Grady MD

## 2020-04-25 NOTE — PROGRESS NOTES
[facility]  DEPARTMENT OF RADIATION ONCOLOGY                                             SIMULATION NOTE  Name: Babar Laboy MRN: [id number]   : 1950 (69 year old)  Date of Service:  2020 Referring: Jeff Hamilton       Reason for simulation: Head  CT needed for planning prophylactic cranial irradiation    History of Present Illness     At the request of medical oncology, this very pleasant 69-year-old gentleman returns with his wife for simulation of his head.     With the patient we verified that he understood the indications for the   treatments, the site that will be receiving the radiation, and the treatment  side effects which might occur.     We evaluated possible positions for setup and treatment, and elected to  simulate the patient in a modified supine position. We used orthogonal   lasers to align him with the CT simulator. We immobilized the patient   with a customized thermoplastic facemask to improve the reproducibility  and safety for daily radiation therapy.  We obtained  and axial CT   imaging through the head and neck region. Therapy, treatment planning,   and I used virtual simulation techniques to verify the adequacy of the CT  images. Tentative isocentric beam entrance points were marked on the  sides of the mask. .      He tolerated the procedure well and without complications.      CT-based treatment planning will be used to generate the radiation   therapy computerized treatment plan, whose dosimetric analysis will   indicate adequate coverage of target tissues and sparing of nearby   normal structures (e.g., brain stem, eyes, parotid glands, spinal cord)..  QA procedures will be done. .He wiished to proceed as recommended.  We answered all questions to his satisfaction.    Tanner Mancia MD

## 2020-04-27 NOTE — PROGRESS NOTES
Patient PCI simulation completed today for Babar Laboy 04/23/20.    Le Henley  April 23, 2020  8:55 AM

## 2020-04-28 NOTE — TELEPHONE ENCOUNTER
Outsides of feet he still has defined rash. Not itching. Rash on other areas of body resolving. Prednisone will be done on Friday. Radiation starts Monday May 4.   Zaria Man RN...........4/28/2020 11:29 AM

## 2020-04-28 NOTE — TELEPHONE ENCOUNTER
Please call Julianne regarding something on the outside of both of Lu's feet.  She did take a picture of it.

## 2020-04-29 NOTE — TELEPHONE ENCOUNTER
Dr. Hamilton reviewed pictures and advises that you continue to watch the rash and call if worsens again. He was pleased that overall rash is better.  Zaria Man RN...........4/29/2020 9:00 AM

## 2020-05-04 NOTE — TELEPHONE ENCOUNTER
heri states that he was bite by tick and now today he had discolor of fingers. Worried that it may be related to tick bite. Would like labs for tick diseases added for lab appt. On Thursday.  Zaria Man RN...........5/4/2020 2:07 PM

## 2020-05-04 NOTE — TELEPHONE ENCOUNTER
"Pt's wife called while on the drive home from his first prophylactic cranial irradiation appointment.  She states that she and her  were just talking and making plans for the day and suddenly pt started to become cold and started shaking.  Instructed her to take his temperature when they get home and assured her this is not a side effect from his radiation tx today.  Pt was agreeable to this and ended the phone call.  About 2-3 minutes later, pt's wife called again and states that she has noticed that \"his hands and fingers are turning yellow\" and he is still shaking.  They are almost home to [place].  Instructed her to take him straight to the Urgent Care in [place] to have him checked out and again assured her that these symptoms are unrelated to his radiation tx, however, he may be coming down with a fever or some other process.  She verbalizes an understanding and is bringing him there now.  Yunior Hernandez RN    "

## 2020-05-06 NOTE — PROGRESS NOTES
Progress Notes  Encounter Date: May 6, 2020  Stefani York MD     RADIATION ONCOLOGY WEEKLY MANAGEMENT PROGRESS NOTE     Patient Care Team       Relationship Specialty Notifications Start End    Mario Grady MD PCP - General Pediatrics  7/1/19     Phone: [phone] Fax: [phone]         [address] [place] [address]    Mario Grady MD Assigned PCP   7/28/19     Phone: [phone] Fax: [phone]         [address] [place] [address]    Jeff Hamilton MD MD Hematology & Oncology  1/7/20     Phone: [phone] Fax: [phone] [address] [place] [address]                  DIAGNOSIS: 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       SUBJECTIVE:    Currently He is feeling fine.  He denies headaches.  No back pain.  He is breathing well and no difficulty swallowing.  He admits to an occasional cough and an occasional headache which responds to Tylenol.  He is not on any steroids.         OBJECTIVE:    WEIGHT: 186 lbs 11.2 oz.  Examination reveals a well-developed well-nourished male in no apparent distress.  Good medical historian.        IMPRESSION:  Routine tolerance to radiation therapy.       PLAN:  Continue treatment as planned .        Medical record and imaging reviewed.      Stefani York MD

## 2020-05-07 NOTE — PROGRESS NOTES
Cardiology Progress Note     Assessment & Plan   Babar Laboy is a 69 year old male who was originally seen on 11/7/2019 for new onset atrial fibrillation on 7/1/2019.    Evens states he had previously seen Dr. Howard. Dr. Howard has since retired and was seen by Dr. Grady. As part of his new patient work-up, he had an EKG. His EKG on 7/1/2019 showed new onset atrial fibrillation. He was appropriately started on Eliquis 5 mg twice a day and continued on atenolol 50 mg daily. He was subsequently referred to cardiology. With atrial fibrillation, he has denied palpitations, fluttering his chest, fatigue, or shortness of breath. Essentially, he has had no symptoms to suggest A. fib.     He was set up for an echocardiogram. An echocardiogram was completed on 7/10/2019 which showed an EF of 55% to 60% with an TAAA at 4.4 cm. There was thickening of the basal septum. He cont's to struggle with elevated blood pressure. I suspect that this aneurysm is secondary to hypertension. This has been discussed previously.  His blood pressure has been controlled on losartan 25 mg daily, atenolol 50 mg daily, and hydrochlorothiazide 25 mg daily.     He had a stress test on 7/18/2019 which was read as having a small reversible perfusion defect in the inferior apical myocardium.  He has denied symptoms which include chest tightness, chest discomfort, or chest pain. He essentially has had no anginal symptoms.  He denies shortness of breath or dyspnea.  Patient is lacking symptoms, additional treatment was not pursued.  If the were to have concerning symptoms in the future, would consider angiogram at that time.  His risk factors include hyperlipidemia, hypertension, tobacco abuse, obesity, and sedentary lifestyle.    5/7/20:  He is here in follow-up for his blood pressure and atrial fibrillation.  He continues to deny symptoms which would correspond with atrial fibrillation.  He has never had symptoms related atrial  fibrillation.  He is tolerating Eliquis without issues.  On physical exam today he remains in atrial fibrillation.  His blood pressure has been somewhat volatile with adjustments to hydrochlorothiazide.  He had previously been on 25 mg daily but describes lower blood pressures but asymptomatic.  As result, he has been taking 12.5 mg as needed.  His pressure has been a little high today at 142/90 with a heart rate of 58.  He was diagnosed with lung cancer with mets to his brain and his spine on 12/1/2019.  He is undergone radiation and chemotherapy with a terrific response.  He is delighted today with his results.  He originally had 14 brain lesions and is now down to 3.  He also had a lesion to his lumbar spine and lung which he describes as essentially gone.  They are continuing treatment in hopes that  if there is a microscopic tumor this will dissipated.    Impression:  1.  Paroxysmal atrial fibrillation.  2.  New onset atrial fibrillation on 7/1/2019.  3.  Hypertension.  4.  Hyperlipidemia.  5.  Abnormal stress test on 7/18/2019 without cardiac symptoms.  6.  Tobacco abuse with counseling.  7.  TAAA at 4.4 cm on an echo from 7/10/2019.  8.  Asymmetric septal hypertrophy.  9.  On continuous anticoagulation.  10.  NESSA with referral placement on 8/8/2019.  He was seen on 10/20/2019 and was set up for home evaluation.    Plan:  1.  Continue Eliquis 5 mg twice a day and atenolol 50 mg daily related to atrial fibrillation.  2.  Plan for repeat echocardiogram post COVID-19 with a history of an ascending aortic aneurysm.  3.  We will start on hydrochlorothiazide 12.5 mg daily as his blood pressures have been running in the 140's.  Previously, he was on 25 mg with self reported low blood pressures but he did not know specific numbers.  He does state he was asymptomatic with these lower blood pressures.  6.  Follow-up in 6 months or sooner with problems.    Luis Christianson        Physical Exam   Temp: 98.8  F (37.1  C)  Temp src: Temporal BP: (!) 142/92 Pulse: 58   Resp: 18 SpO2: 100 %      Vitals:    05/07/20 1057   Weight: 83.5 kg (184 lb)     Vital Signs with Ranges  Temp:  [97.7  F (36.5  C)-98.8  F (37.1  C)] 98.8  F (37.1  C)  Pulse:  [58-70] 70  Resp:  [16-18] 18  BP: (142-167)/(90-94) 145/90  SpO2:  [100 %] 100 %  ROS negative except that which was noted in the HPI.    Port A Cath Single 12/23/19 Right Chest wall (Active)   Access Date 05/07/20 05/07/20 1035   Access Attempts 1 05/07/20 1035   Gauge Noncoring 90 degree bend;19 gauge;3/4 inch 05/07/20 1035   Site Assessment WDL 05/07/20 1035   Line Status Blood return noted;Saline locked 05/07/20 1035   Extravasation? No 05/07/20 1035   Dressing Intervention Transparent;Chlorhexadine sponge;New dressing 05/07/20 1035   Line Necessity Yes, meets criteria 05/07/20 1035   De-Access Date 05/07/20 05/07/20 1035   Date to be Reflushed 01/29/20 01/28/20 1027   Number of days: 136       Incision/Surgical Site 12/23/19 Right Chest (Active)   Closure Liquid bandage 12/23/19 1500   Incision Drainage Amount None 12/23/19 1500   Dressing Intervention Clean, dry, intact 12/23/19 1500   Number of days: 136       Constitutional: awake, alert, cooperative, no apparent distress, and appears stated age  Eyes: Lids and lashes normal, pupils equal, sclera clear, conjunctiva normal  ENT: Normocephalic, without obvious abnormality, atraumatic.  Respiratory: No increased work of breathing, good air exchange, clear to auscultation bilaterally, no crackles or wheezing  Cardiovascular: Normal apical impulse, regular rate and rhythm, normal S1 and S2, no S3 or S4, and no murmur noted  GI: No scars, normal bowel sounds, soft  Musculoskeletal: mild lower extremity pitting edema present  Neurologic: Awake, alert, oriented to name, place and time.  Neuropsychiatric: General: normal, calm and normal eye contact    Medications         Data   Results for orders placed or performed during the hospital  encounter of 05/07/20 (from the past 24 hour(s))   Comprehensive metabolic panel   Result Value Ref Range    Sodium 131 (L) 134 - 144 mmol/L    Potassium 4.3 3.5 - 5.1 mmol/L    Chloride 98 98 - 107 mmol/L    Carbon Dioxide 23 21 - 31 mmol/L    Anion Gap 10 3 - 14 mmol/L    Glucose 116 (H) 70 - 105 mg/dL    Urea Nitrogen 14 7 - 25 mg/dL    Creatinine 0.76 0.70 - 1.30 mg/dL    GFR Estimate >90 >60 mL/min/[1.73_m2]    GFR Estimate If Black >90 >60 mL/min/[1.73_m2]    Calcium 9.3 8.6 - 10.3 mg/dL    Bilirubin Total 0.7 0.3 - 1.0 mg/dL    Albumin 4.0 3.5 - 5.7 g/dL    Protein Total 7.3 6.4 - 8.9 g/dL    Alkaline Phosphatase 56 34 - 104 U/L    ALT 33 7 - 52 U/L    AST 23 13 - 39 U/L   TSH Reflex GH   Result Value Ref Range    TSH Reflex 1.22 0.34 - 5.60 IU/mL   CBC with platelets differential   Result Value Ref Range    WBC 6.5 4.0 - 11.0 10e9/L    RBC Count 3.40 (L) 4.4 - 5.9 10e12/L    Hemoglobin 11.6 (L) 13.3 - 17.7 g/dL    Hematocrit 34.7 (L) 40.0 - 53.0 %     (H) 78 - 100 fl    MCH 34.1 (H) 26.5 - 33.0 pg    MCHC 33.4 31.5 - 36.5 g/dL    RDW 15.2 (H) 10.0 - 15.0 %    Platelet Count 224 150 - 450 10e9/L    Diff Method Automated Method     % Neutrophils 78.2 %    % Lymphocytes 8.0 %    % Monocytes 9.6 %    % Eosinophils 2.0 %    % Basophils 0.5 %    % Immature Granulocytes 1.7 %    Absolute Neutrophil 5.1 1.6 - 8.3 10e9/L    Absolute Lymphocytes 0.5 (L) 0.8 - 5.3 10e9/L    Absolute Monocytes 0.6 0.0 - 1.3 10e9/L    Absolute Eosinophils 0.1 0.0 - 0.7 10e9/L    Absolute Basophils 0.0 0.0 - 0.2 10e9/L    Abs Immature Granulocytes 0.1 0 - 0.4 10e9/L     No results found for this or any previous visit (from the past 24 hour(s)).

## 2020-05-07 NOTE — PROGRESS NOTES
Infusion Nursing Note:  Babar CALLAHAN Narda presents today for Tecentriq cycle 6 day 1.    Patient seen by provider today: Yes: Dr. Christianson   present during visit today: Not Applicable.    Note: N/A.    Intravenous Access:  Labs drawn without difficulty from port access.  Implanted Port accessed with brisk blood return..    Treatment Conditions:  Lab Results   Component Value Date    HGB 11.6 05/07/2020     Lab Results   Component Value Date    WBC 6.5 05/07/2020      Lab Results   Component Value Date    ANEU 5.1 05/07/2020     Lab Results   Component Value Date     05/07/2020      Lab Results   Component Value Date     05/07/2020                   Lab Results   Component Value Date    POTASSIUM 4.3 05/07/2020           Lab Results   Component Value Date    MAG 1.9 02/28/2020            Lab Results   Component Value Date    CR 0.76 05/07/2020                   Lab Results   Component Value Date    DONELL 9.3 05/07/2020                Lab Results   Component Value Date    BILITOTAL 0.7 05/07/2020           Lab Results   Component Value Date    ALBUMIN 4.0 05/07/2020                    Lab Results   Component Value Date    ALT 33 05/07/2020           Lab Results   Component Value Date    AST 23 05/07/2020       Results reviewed, labs MET treatment parameters, ok to proceed with treatment.      Post Infusion Assessment:  Patient tolerated infusion without incident.  Blood return noted pre and post infusion.  No evidence of extravasations.  Access discontinued per protocol.  Biologic Infusion Post Education: Call the triage nurse at your clinic or seek medical attention if you have chills and/or temperature greater than or equal to 100.5, uncontrolled nausea/vomiting, diarrhea, constipation, dizziness, shortness of breath, chest pain, heart palpitations, weakness or any other new or concerning symptoms, questions or concerns.  You cannot have any live virus vaccines prior to or during treatment or up to 6  months post infusion.  If you have an upcoming surgery, medical procedure or dental procedure during treatment, this should be discussed with your ordering physician and your surgeon/dentist.  If you are having any concerning symptom, if you are unsure if you should get your next infusion or wish to speak to a provider before your next infusion, please call your care coordinator or triage nurse at your clinic to notify them so we can adequately serve you.       Discharge Plan:   Patient and/or family verbalized understanding of discharge instructions and all questions answered.  Copy of AVS reviewed with patient and/or family.  Patient will return 5/28 for next appointment.  Patient discharged in stable condition accompanied by: self.  Departure Mode: Ambulatory.    Olga Maier RN

## 2020-05-07 NOTE — NURSING NOTE
Patient is being seen by the provider in infusion where all required infusion department rooming assessments, screenings, and vital signs were done by infusion RN.   Zaria Man RN...........5/7/2020 10:56 AM

## 2020-05-07 NOTE — PATIENT INSTRUCTIONS
You were seen by  Luis Christianson DO       1. A Echocardiogram has been ordered post COVID You will be called to schedule this. You will receive instructions for testing at that time. You will be contacted with results.       2. Start taking hydrochlorothiazide (HYDRODIURIL) 12.5 MG tablet Take 1 tablet (12.5 mg) by mouth daily ** May take one additional tab (12.5 mg) per day if BP systolic is greater than 140 and/or diastolic greater than 90    3. No other changes at this time.        You will follow up with [facility] in 6 months, sooner if needed.       Please call the cardiology office with problems, questions, or concerns at [phone].    If you experience chest pain, chest pressure, chest tightness, shortness of breath, fainting, lightheadedness, nausea, vomiting, or other concerning symptoms, please report to the Emergency Department or call 911. These symptoms may be emergent, and best treated in the Emergency Department.     Cardiology Nurses  JODI Crawford, LATA BROOKS LPN  [facility] (Unit 3C)  [phone]

## 2020-05-07 NOTE — NURSING NOTE
"Patient comes in for 6 month follow up on a-Fib.  Sneha Huber LPN ....................5/7/2020   11:01 AM  Chief Complaint   Patient presents with     Follow Up     6 month-atrial fib       Initial BP (!) 142/92 (BP Location: Right arm, Patient Position: Sitting, Cuff Size: Adult Large)   Pulse 58   Temp 98.8  F (37.1  C) (Temporal)   Resp 18   Wt 83.5 kg (184 lb)   SpO2 100%   BMI 28.39 kg/m   Estimated body mass index is 28.39 kg/m  as calculated from the following:    Height as of 2/25/20: 1.715 m (5' 7.5\").    Weight as of this encounter: 83.5 kg (184 lb).  Meds Reconciled: complete  Pt is not on Aspirin  Pt is not on a Statin  PHQ and/or AYANA reviewed. Pt referred to PCP/MH Provider as appropriate.    Sneha Huber LPN      "

## 2020-05-08 NOTE — TELEPHONE ENCOUNTER
Needs MR brain in July not now.[facility] contacted will verify with Jeff Hamilton MD on Monday.  Zaria Man RN...........5/8/2020 10:31 AM

## 2020-05-11 NOTE — TELEPHONE ENCOUNTER
Per Jeff Hamilton MD scans should be in July as ordered. [facility] notified and [facility] will call carolyn to schedule.  Zaria Man RN...........5/11/2020 12:54 PM

## 2020-05-13 NOTE — TELEPHONE ENCOUNTER
"Requested Prescriptions   Pending Prescriptions Disp Refills     losartan (COZAAR) 25 MG tablet 90 tablet 3     Sig: Take 1 tablet (25 mg) by mouth daily       Angiotensin-II Receptors Passed - 5/13/2020  8:09 AM        Passed - Last blood pressure under 140/90 in past 12 months     BP Readings from Last 3 Encounters:   05/07/20 133/85   05/07/20 (!) 142/92   04/16/20 116/82                 Passed - Recent (12 mo) or future (30 days) visit within the authorizing provider's specialty     Patient has had an office visit with the authorizing provider or a provider within the authorizing providers department within the previous 12 mos or has a future within next 30 days. See \"Patient Info\" tab in inbasket, or \"Choose Columns\" in Meds & Orders section of the refill encounter.              Passed - Medication is active on med list        Passed - Patient is age 18 or older        Passed - Normal serum creatinine on file in past 12 months     Recent Labs   Lab Test 05/07/20  1025   CR 0.76       Ok to refill medication if creatinine is low          Passed - Normal serum potassium on file in past 12 months     Recent Labs   Lab Test 05/07/20  1025   POTASSIUM 4.3                     Prescription approved per [facility] Refill Protocol.  Patient recently seen by Luis Christianson DO on 05/04/2020.    Brie Iniguez RN on 5/13/2020 at 3:30 PM        "

## 2020-05-13 NOTE — PROGRESS NOTES
[facility]  DEPARTMENT OF RADIATION ONCOLOGY   ON TREATMENT VISIT (OTV) NOTE    Name: Babar Laboy MRN: [id number]   : 1950 (69 year old)  Date of Service: 2020 Referring: Dr. 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     Subjective:  Feeling relatively well. Reports 7-lb decrease after stopping steroids (management of adverse reaction to atezolizumab) prior to radiation therapy. Attributes lower appetite to the cessation of steroids. Reports several days of a mild headache in the afternoons. Does not require an analgesic; easily tolerated; minimal impact on quality of life. No nausea, vomiting, or focal deficits. Counseled about COVID-19 risks, precautions, and potential impact on his radiation therapy. Denied known exposure to COVID-19, risky behaviors, or symptoms including febrile, chest, gustatory, and systemic complaints.    Objective:  Vitals: Wt 81.2 kg (179 lb)   BMI 27.62 kg/m    KPS: 80.  Physical Exam:  -General: Appears well developed, well nourished, and in no acute distress.  -HEENT: No alopecia or erythema.  -Lungs: No cough. Easy respirations. Regular. Unlabored. No use of accessory musculature.  -Pelvis: No erythema or hyperpigmentation. Non-distended.   -Neuro: Alert, oriented, nonfocal.  -Psych: Pleasant, cooperative, insightful, concrete.    Impression:  Routine tolerance to radiation therapy. Grade 1 toxicity attributable to radiation therapy.     Plan:  1) Headache:  Continue to observe. Might be related to radiation therapy. Patient does not desire resumption of steroids.  2)  Chemotherapy:  Continue to coordinate with medical oncology their delivery of immunotherapy with supportive care and labs as indicated.  3) Radiation therapy:  No new interventions. No boost/cone down pending. Reviewed anticipated time course, nature, and potential interventions for managing toxicity during and after radiation therapy. Alopecia liikely in the next few days. Patient aware of the continued coverage of our clinic by multiple physicians. He wished to proceed with the recommended treatment. All questions answered to his satisfaction.     Weekly Review:  Reviewed available labs and diagnostic studies. Discussed management with Therapy, Treatment Planning, and Nursing. Review of weekly routine QA, linac imaging, and clinical set up was adequate to proceed with radiation therapy as prescribed.    Medications:  Current Outpatient Medications   Medication Sig Dispense Refill     acetaminophen (TYLENOL) 500 MG tablet Take 1,000 mg by mouth every 6 hours as needed for mild pain       apixaban ANTICOAGULANT (ELIQUIS) 5 MG tablet Take 1 tablet (5 mg) by mouth 2 times daily 60 tablet 11     atenolol (TENORMIN) 50 MG tablet Take 1 tablet (50 mg) by mouth daily Dose increase 90 tablet 3     atorvastatin (LIPITOR) 10 MG tablet Take 1 tablet (10 mg) by mouth daily 90 tablet 3     esomeprazole 40 MG PO DR capsule Take 1 capsule (40 mg) by mouth every morning (before breakfast) Take 30-60 minutes before eating. 30 capsule 3     gabapentin (NEURONTIN) 300 MG capsule Take 2 capsules (600 mg) by mouth 3 times daily 180 capsule 2     glutamine 500 MG TABS Take 500 mg by mouth daily 90 tablet 3     HEMP OIL OR EXTRACT OR OTHER CBD CANNABINOID, NOT MEDICAL CANNABIS,        hydrochlorothiazide (HYDRODIURIL) 12.5 MG tablet Take 1 tablet (12.5 mg) by mouth daily 90 tablet 3     lidocaine-prilocaine (EMLA) 2.5-2.5 % external cream Apply to port site 1 hour prior to needlesticks 30 g 1     LORazepam (ATIVAN) 0.5 MG tablet Take 1  tablet (0.5 mg) by mouth every 4 hours as needed (Anxiety, Nausea/Vomiting or Sleep) (Patient not taking: Reported on 5/7/2020) 30 tablet 3     losartan (COZAAR) 25 MG tablet Take 1 tablet (25 mg) by mouth daily 90 tablet 3     NONFORMULARY Take 1 capsule by mouth daily Probiotic Colon Supplement       oxyCODONE 5 MG PO tablet Take 1 tablet (5 mg) by mouth every 6 hours as needed for pain 60 tablet 0     Phenylephrine-APAP-guaiFENesin (MUCINEX FAST-MAX COLD & SINUS) -400 MG/20ML LIQD Take as needed BID       potassium chloride ER (K-TAB/KLOR-CON) 10 MEQ CR tablet TAKE 2 TABLETS (20 MEQ) BY MOUTH DAILY 60 tablet 1     predniSONE (DELTASONE) 20 MG tablet Take 20 mg twice daily for 1 week, then 20 mg daily for 1 weeks and then stop 84 tablet 0     prochlorperazine (COMPAZINE) 10 MG tablet Take 1 tablet (10 mg) by mouth every 6 hours as needed (Nausea/Vomiting) 30 tablet 3     sucralfate 1 GM/10ML PO suspension Take 10 mLs (1 g) by mouth 4 times daily 420 mL 3     vitamin B6 (PYRIDOXINE) 100 MG tablet Take 1 tablet (100 mg) by mouth daily 90 tablet 3         Allergies:    No Known Allergies      Duy Kelly MD, PhD

## 2020-05-14 NOTE — PROGRESS NOTES
[facility]  DEPARTMENT OF RADIATION ONCOLOGY   ON TREATMENT VISIT (OTV) NOTE    Name: Babar Laboy MRN: [id number]   : 1950 (69 year old)  Date of Service: 2020 Referring: Dr. Hamilton       Diagnosis and Cancer Staging  Malignant neoplasm of hilus of left lung (H)  Staging form: Lung, AJCC 8th Edition  - Clinical: Stage IV (cT4, cN3, cM1) - Signed by Duy Kelly MD on 2020      Recent History:  Seen at [facility] with staff. Reports new left ear fullness and slight unsteadiness of gait. No headache, nausea, vomiting, fever, chest discomfort, or malaise. Does not feel ill or unwell. Complaints might be due to seasonal spring environmental changes exacerbated by whole brain radiation therapy and cessation of immunosuppressive steroids. Counseled to start an over-the-counter antihistamine such as Zyrtec. In the absence of nausea or a bothersome headache, will not restart steroids. If symptoms worsens, will consider a brain MRI to rule out metastatic disease. Patient wished to proceed a recommended. All questions answered to his satisfaction.    Medications:  Current Outpatient Medications   Medication Sig Dispense Refill     acetaminophen (TYLENOL) 500 MG tablet Take 1,000 mg by mouth every 6 hours as needed for mild pain       apixaban ANTICOAGULANT (ELIQUIS) 5 MG tablet Take 1 tablet (5 mg) by mouth 2 times daily 60 tablet 11     atenolol (TENORMIN) 50 MG tablet Take 1 tablet (50 mg) by mouth daily Dose increase 90 tablet 3     atorvastatin (LIPITOR) 10 MG tablet Take 1 tablet (10 mg) by mouth daily 90 tablet 3     esomeprazole 40 MG PO DR capsule Take 1 capsule (40 mg) by mouth every morning (before breakfast) Take 30-60 minutes before eating. 30 capsule 3     gabapentin (NEURONTIN) 300 MG capsule Take 2 capsules (600 mg) by mouth 3 times daily 180 capsule 2     glutamine 500 MG TABS Take 500 mg by mouth daily 90 tablet 3     HEMP OIL OR EXTRACT OR OTHER CBD CANNABINOID, NOT  MEDICAL CANNABIS,        hydrochlorothiazide (HYDRODIURIL) 12.5 MG tablet Take 1 tablet (12.5 mg) by mouth daily 90 tablet 3     lidocaine-prilocaine (EMLA) 2.5-2.5 % external cream Apply to port site 1 hour prior to needlesticks 30 g 1     LORazepam (ATIVAN) 0.5 MG tablet Take 1 tablet (0.5 mg) by mouth every 4 hours as needed (Anxiety, Nausea/Vomiting or Sleep) (Patient not taking: Reported on 5/7/2020) 30 tablet 3     losartan (COZAAR) 25 MG tablet Take 1 tablet (25 mg) by mouth daily 90 tablet 3     NONFORMULARY Take 1 capsule by mouth daily Probiotic Colon Supplement       oxyCODONE 5 MG PO tablet Take 1 tablet (5 mg) by mouth every 6 hours as needed for pain 60 tablet 0     Phenylephrine-APAP-guaiFENesin (MUCINEX FAST-MAX COLD & SINUS) -400 MG/20ML LIQD Take as needed BID       potassium chloride ER (K-TAB/KLOR-CON) 10 MEQ CR tablet TAKE 2 TABLETS (20 MEQ) BY MOUTH DAILY 60 tablet 1     predniSONE (DELTASONE) 20 MG tablet Take 20 mg twice daily for 1 week, then 20 mg daily for 1 weeks and then stop 84 tablet 0     prochlorperazine (COMPAZINE) 10 MG tablet Take 1 tablet (10 mg) by mouth every 6 hours as needed (Nausea/Vomiting) 30 tablet 3     sucralfate 1 GM/10ML PO suspension Take 10 mLs (1 g) by mouth 4 times daily 420 mL 3     vitamin B6 (PYRIDOXINE) 100 MG tablet Take 1 tablet (100 mg) by mouth daily 90 tablet 3         Allergies:    No Known Allergies      Duy Kelly MD, PhD

## 2020-05-15 NOTE — PROGRESS NOTES
"  [facility]  DEPARTMENT OF RADIATION ONCOLOGY  TREATMENT SUMMARY NOTE    Name: Babar Laboy MRN: [id number]   : 1950 (69 year old)  Date of Service: 05/15/2020 Referring: Dr. Hamilton       Diagnosis and Cancer Staging  Malignant neoplasm of hilus of left lung (H)  Staging form: Lung, AJCC 8th Edition  - Clinical: Stage IV (cT4, cN3, cM1) - Signed by Duy Kelly MD on 2020      RADIATION THERAPY:  Treatment intent:  Palliative.  Primary area treated:  Brain (prophylactic cranial irradiation, PCI).  Primary treatment technique:  3D conformal radiation..  Primary energy:  6 MV.  Primary tumor dose:  2,500 cGy in 250 cGy fractions.  Primary number of treatments:  10 fractions.  Elapsed calendar days:  11 elapsed days (2020-05/15/2020).  Completion date:  05/15/2020.    SUMMARY:  Today, this very pleasant 69 year old patient completed prophylactic cranial irradiation following a good clinical and radiographic response to chemoradiation for his intrathoracic disease. His radiographic re-staging work-up was negative for intracranial extracranial metastatic disease. Therefore, the consensus recommendation of the multidisciplinary oncology team was prophylactic cranial irradiation (PCI). Patient tolerated treatment very well. During the last days of treatment, he complained of ear fullness and gait unsteadiness. He also complained of mild headaches in the afternoon but no nausea, vomiting, or focal deficits. The condition resolved after starting Benadryl and the ear \"popping\". My impression was that the patient was more likely experiencing seasonal allergy-related symptoms perhaps exacerbated by his recent cessation of steroids that were taken for an adverse drug reaction. The patient understands that alopecia is likely to begin within the next few days but the recovery could begin in several months. Given the ongoing COVID-19 pandemic and his immunocompromise state, will contact the " patient in approximately 2 weeks for a telemedicine virtual assessment of his condition. If indicated, we will asked the patient to come to clinic for a formal evaluation. The patient will follow-up with medical oncology as the principal sources of oncologic care. We counseled him to continue to see his primary care physician about general and maintenance health issues. We counseled the patient multiple times about issues surrounding COVID-19. He wished to proceed as recommended. We answered all questions to his satisfaction. Thank you for allowing us to participate in the care of this very pleasant patient.     MEDICATIONS:  Current Outpatient Medications   Medication Sig Dispense Refill     acetaminophen (TYLENOL) 500 MG tablet Take 1,000 mg by mouth every 6 hours as needed for mild pain       apixaban ANTICOAGULANT (ELIQUIS) 5 MG tablet Take 1 tablet (5 mg) by mouth 2 times daily 60 tablet 11     atenolol (TENORMIN) 50 MG tablet Take 1 tablet (50 mg) by mouth daily Dose increase 90 tablet 3     atorvastatin (LIPITOR) 10 MG tablet Take 1 tablet (10 mg) by mouth daily 90 tablet 3     esomeprazole 40 MG PO DR capsule Take 1 capsule (40 mg) by mouth every morning (before breakfast) Take 30-60 minutes before eating. 30 capsule 3     gabapentin (NEURONTIN) 300 MG capsule Take 2 capsules (600 mg) by mouth 3 times daily 180 capsule 2     glutamine 500 MG TABS Take 500 mg by mouth daily 90 tablet 3     HEMP OIL OR EXTRACT OR OTHER CBD CANNABINOID, NOT MEDICAL CANNABIS,        hydrochlorothiazide (HYDRODIURIL) 12.5 MG tablet Take 1 tablet (12.5 mg) by mouth daily 90 tablet 3     lidocaine-prilocaine (EMLA) 2.5-2.5 % external cream Apply to port site 1 hour prior to needlesticks 30 g 1     LORazepam (ATIVAN) 0.5 MG tablet Take 1 tablet (0.5 mg) by mouth every 4 hours as needed (Anxiety, Nausea/Vomiting or Sleep) (Patient not taking: Reported on 5/7/2020) 30 tablet 3     losartan (COZAAR) 25 MG tablet Take 1 tablet (25 mg)  by mouth daily 90 tablet 3     NONFORMULARY Take 1 capsule by mouth daily Probiotic Colon Supplement       oxyCODONE 5 MG PO tablet Take 1 tablet (5 mg) by mouth every 6 hours as needed for pain 60 tablet 0     Phenylephrine-APAP-guaiFENesin (MUCINEX FAST-MAX COLD & SINUS) -400 MG/20ML LIQD Take as needed BID       potassium chloride ER (K-TAB/KLOR-CON) 10 MEQ CR tablet TAKE 2 TABLETS (20 MEQ) BY MOUTH DAILY 60 tablet 1     predniSONE (DELTASONE) 20 MG tablet Take 20 mg twice daily for 1 week, then 20 mg daily for 1 weeks and then stop 84 tablet 0     prochlorperazine (COMPAZINE) 10 MG tablet Take 1 tablet (10 mg) by mouth every 6 hours as needed (Nausea/Vomiting) 30 tablet 3     sucralfate 1 GM/10ML PO suspension Take 10 mLs (1 g) by mouth 4 times daily 420 mL 3     vitamin B6 (PYRIDOXINE) 100 MG tablet Take 1 tablet (100 mg) by mouth daily 90 tablet 3         ALLERGIES:    No Known Allergies      Duy Kelly MD, PhD

## 2020-05-19 NOTE — PROGRESS NOTES
Outpatient Physical Therapy Discharge Note     Patient: Babar Laboy  : 1950    Beginning/End Dates of Reporting Period:  20 to 2020    Referring Provider: Jeff Hamilton MD    Therapy Diagnosis: general weakness, limited activity tolerance,  weakness, neuropathy     Client Self Report: See evaluation    Objective Measurements: See evaluation    Goals:  Goal Identifier fatigue   Goal Description Patient to report improvement with fatigue with improved score on FACIT scale.   Target Date 20   Date Met      Progress:     Goal Identifier balance   Goal Description Patient to have improved score on Rowan balance test showing reduced risk for falls, scoring greater than 45 points.    Target Date 20   Date Met      Progress:     Goal Identifier stair use   Goal Description Patient to demonstrate ability to safely negotiate stairs in reciprocal pattern with out wihtout use of hand rail.    Target Date 20   Date Met      Progress:     Goal Identifier  strength   Goal Description Patient to demonstrate improved  and pincer strength by 10# to aid in daily functional tasks such as cooking, self cares.    Target Date 20   Date Met      Progress:     Goal Identifier HEP   Goal Description Patient to be compliant with HEP for maintaining functional gains from physical therapy interventions.    Target Date 05/10/20   Date Met      Progress:     Progress Toward Goals:   Progress limited due to COVID-19 and recommendation from oncology that he not attend PT. Per phone conversation with spouse, his numbness did improve in his hands, more fine motor control with HEP.           Plan:  Discharge from therapy.    Discharge:    Reason for Discharge: Patient has not made expected progress due to interrupted treatment attendance.    Equipment Issued: NA    Discharge Plan: Patient to continue home program.

## 2020-05-19 NOTE — TELEPHONE ENCOUNTER
Refill Request for: Atorvastatin (LIPITOR) 10 MG tablet   Received From: [facility] #[id number]  Last Written Prescription Date: 7/1/2019 for 90 tablets and 3 refills  LOV: 7/1/2019 with PCP  Next Appointment: No upcoming office visit on file during initial refill review with PCP  Protocol: [facility] Protocol Passed - 05/16/2020 12:46 PM    Prescription approved per [facility] Medication Refill Protocol.      Landy HERNADEZN, RN on 5/19/2020 at 8:06 AM

## 2020-05-21 NOTE — TELEPHONE ENCOUNTER
9:13 AM    Reason for Call: Phone Call    Description: pt missed call to schedule and they have questions     Was an appointment offered for this call? No  If yes : Appointment type              Date    Preferred method for responding to this message: Telephone Call  What is your phone number ? [phone]    If we cannot reach you directly, may we leave a detailed response at the number you provided? They have questions    Can this message wait until your PCP/provider returns, if available today? YES, No, pt was just called    Lauryn Galeana

## 2020-05-27 NOTE — PROGRESS NOTES
Infusion Nursing Note:  Babar Laboy presents today for Fluids and lab draw.    Patient seen by provider today: No   present during visit today: Not Applicable.    Note: patient and wife states he has been more dizzy lately, thinks he needed fluids, we discussed keeping tract of blood pressure for a few days and following up with primary or cardiologist.    Intravenous Access:  Labs drawn without difficulty.  Implanted Port.    Treatment Conditions:  Not Applicable.      Post Infusion Assessment:  Patient tolerated infusion without incident.  Blood return noted pre and post infusion.  Site patent and intact, free from redness, edema or discomfort.  No evidence of extravasations.       Discharge Plan:   Patient declined prescription refills.  Discharge instructions reviewed with: Patient.  Patient and/or family verbalized understanding of discharge instructions and all questions answered.  Patient discharged in stable condition accompanied by: self.  Departure Mode: Ambulatory returning tomorrow for treatment.    Jean S. Hammann, RN

## 2020-05-27 NOTE — TELEPHONE ENCOUNTER
"  Reason for Disposition    Systolic BP < 90 and feeling dizzy, lightheaded, or weak    Additional Information    Systolic BP  while taking blood pressure medications and NOT dizzy, lightheaded or weak    Answer Assessment - Initial Assessment Questions  1. BLOOD PRESSURE: \"What is the blood pressure?\" \"Did you take at least two measurements 5 minutes apart?\"      No.   2. ONSET: \"When did you take your blood pressure?\"      This morning  3. HOW: \"How did you obtain the blood pressure?\" (e.g., visiting nurse, automatic home BP monitor)      Taking with automatic monitor.   4. HISTORY: \"Do you have a history of low blood pressure?\" \"What is your blood pressure normally?\"      No.   5. MEDICATIONS: \"Are you taking any medications for blood pressure?\" If yes: \"Have they been changed recently?\"      Yes. Added Hydrochlorathizide on 5/7/20.   6. PULSE RATE: \"Do you know what your pulse rate is?\"       *No Answer*  7. OTHER SYMPTOMS: \"Have you been sick recently?\" \"Have you had a recent injury?\"      Has lung cancer. Is being treated with radiation and chemo therapy.   8. PREGNANCY: \"Is there any chance you are pregnant?\" \"When was your last menstrual period?\"      N/a    Protocols used: LOW BLOOD PRESSURE-A-OH    "

## 2020-05-27 NOTE — TELEPHONE ENCOUNTER
S-(situation): Patient wife calling stating patient has had low blood pressure and is symptomatic today.     B-(background): Patient has hypertension that is controlled with medications. Patient takes Losartan, Atenalol, and was recently put on Hydrochlorathizide on 5/7/2020 with instructions to take 12.5mg if BP is over 140/90.Patient has not taken the hydrochlorathizide in several days. Patient blood pressure 3 days ago was  121/90, 107/84 and 122/83. Yesterday blood was 125/91. This morning blood pressure was 134/95, at 10:00am it was 117/97. And at 1:15 blood pressure is 86/68. Pulse of 57/ Wife reports patient is lightheaded, dizziness, unsteady on feet, fatigued and loss of appetite.     A-(assessment): Patient is alert. Patient has had no diet changes, Is drinking plenty of fluids. No recent illness or injury per patients wife.     R-(recommendations): Based on assessment and protocol I recommend patient present to the ED fur further evaluation. Wife agreed. Dorothea Olivarez RN on 5/27/2020 at 2:24 PM

## 2020-05-28 NOTE — PROGRESS NOTES
Infusion Nursing Note:  Babar Laboy presents today for Tecentriq cycle 7.    Patient seen by provider today: Yes: Provatas to examine spots/short visit   present during visit today: Not Applicable.    Note: N/A.    Intravenous Access:  Implanted Port.    Treatment Conditions:  Lab Results   Component Value Date     05/27/2020                   Lab Results   Component Value Date    POTASSIUM 4.0 05/27/2020           Lab Results   Component Value Date    MAG 1.9 02/28/2020            Lab Results   Component Value Date    CR 0.92 05/27/2020                   Lab Results   Component Value Date    DONELL 9.3 05/27/2020                Lab Results   Component Value Date    BILITOTAL 0.3 05/27/2020           Lab Results   Component Value Date    ALBUMIN 3.8 05/27/2020                    Lab Results   Component Value Date    ALT 20 05/27/2020           Lab Results   Component Value Date    AST 16 05/27/2020       Results reviewed, labs MET treatment parameters, ok to proceed with treatment.      Post Infusion Assessment:  Patient tolerated infusion without incident.  Blood return noted pre and post infusion.  Site patent and intact, free from redness, edema or discomfort.  No evidence of extravasations.  Access discontinued per protocol.       Discharge Plan:   Discharge instructions reviewed with: Patient.  Patient and/or family verbalized understanding of discharge instructions and all questions answered.  Copy of AVS reviewed with patient and/or family.  Patient will return 21 days for next appointment.  Patient discharged in stable condition accompanied by: self.  Departure Mode: Ambulatory.    Jean S. Hammann, RN

## 2020-05-29 NOTE — TELEPHONE ENCOUNTER
Wife, Julianne, reports Lu's dizziness is better after he received IV fluids yesterday.  B/P was running low (86/60s).  Headache has improved.  Ears are not plugged.  Fatigue is improving. If he continues to feel well they are going to cancel ENT appt scheduled for 6/11/20. He will continue to follow-up with Dr. Hamilton.  They have no questions or concerns.     Savana Kelly RN

## 2020-06-02 NOTE — PROGRESS NOTES
Nursing Notes:   Gianna Crain LPN  6/2/2020  3:03 PM  Sign at exiting of workspace  Patient presents today for blood pressure concerns. He as been monitoring his blood pressures at home and they have been fluctuating.    Medication Reconciliation Complete    Gianna RUIZ Ordanielle, LPN  6/2/2020 3:01 PM       SUBJECTIVE:  69 year old male presents for variable blood pressure     He was diagnosed with lung cancer in December. Treated with chemotherapy. Then had radiation on brain. Continues on immunotherapy.  Stopped drinking alcohol. Stopped smoking. Lost weight.  Has not been taking blood pressure medication consistently as he often feels sedated and over medicated when on all the medications. Will take pills for blood pressure over 140/90 and then if really low will skip entirely. Has multiple readings of 80s/60s on a list from home. I tried to clarify if he would take pills if his blood pressure was 120/70.  He says he will often skip his pills at that level, but take them later when his blood pressure increases.  He is then taking atenolol, hydrochlorothiazide, and losartan.  Sounds like he is taking all pills or no pills and has not tried taking only 1 or 2 of them.    He correlates the lower readings with radiation treatments.  Appetite is reduced, especially around immunotherapy treatments.    Had new onset a-fib in July 2019. Started on Eliquis and continued atenolol.  Saw Dr. Christianson last year who added losartan.  Last appointment with cardiology in May 2020, Dr. Christianson added hydrochlorothiazide due to elevated readings.    REVIEW OF SYSTEMS:    Denies chest pain.  Activities and exertion are limited due to fatigue, but no chest discomfort or shortness of breath.    Current Outpatient Medications   Medication Sig Dispense Refill     acetaminophen (TYLENOL) 500 MG tablet Take 1,000 mg by mouth every 6 hours as needed for mild pain       apixaban ANTICOAGULANT (ELIQUIS) 5 MG tablet Take 1 tablet (5 mg) by  mouth 2 times daily 60 tablet 11     atenolol (TENORMIN) 50 MG tablet Take 1 tablet (50 mg) by mouth daily Dose increase 90 tablet 3     atorvastatin (LIPITOR) 10 MG tablet TAKE 1 TABLET (10 MG) BY MOUTH DAILY 90 tablet 0     esomeprazole 40 MG PO DR capsule Take 1 capsule (40 mg) by mouth every morning (before breakfast) Take 30-60 minutes before eating. 30 capsule 3     gabapentin (NEURONTIN) 300 MG capsule Take 2 capsules (600 mg) by mouth 3 times daily 180 capsule 2     glutamine 500 MG TABS Take 500 mg by mouth daily 90 tablet 3     HEMP OIL OR EXTRACT OR OTHER CBD CANNABINOID, NOT MEDICAL CANNABIS,        hydrochlorothiazide (HYDRODIURIL) 12.5 MG tablet Take 1 tablet (12.5 mg) by mouth daily 90 tablet 3     lidocaine-prilocaine (EMLA) 2.5-2.5 % external cream Apply to port site 1 hour prior to needlesticks 30 g 1     LORazepam (ATIVAN) 0.5 MG tablet Take 1 tablet (0.5 mg) by mouth every 4 hours as needed (Anxiety, Nausea/Vomiting or Sleep) 30 tablet 3     losartan (COZAAR) 25 MG tablet Take 1 tablet (25 mg) by mouth daily 90 tablet 3     NONFORMULARY Take 1 capsule by mouth daily Probiotic Colon Supplement       oxyCODONE 5 MG PO tablet Take 1 tablet (5 mg) by mouth every 6 hours as needed for pain 60 tablet 0     Phenylephrine-APAP-guaiFENesin (MUCINEX FAST-MAX COLD & SINUS) -400 MG/20ML LIQD Take as needed BID       potassium chloride ER (K-TAB/KLOR-CON) 10 MEQ CR tablet TAKE 2 TABLETS (20 MEQ) BY MOUTH DAILY 60 tablet 1     prochlorperazine (COMPAZINE) 10 MG tablet Take 1 tablet (10 mg) by mouth every 6 hours as needed (Nausea/Vomiting) 30 tablet 3     sucralfate 1 GM/10ML PO suspension Take 10 mLs (1 g) by mouth 4 times daily 420 mL 3     triamcinolone (KENALOG) 0.1 % external cream Apply topically 2 times daily 30 g 1     vitamin B6 (PYRIDOXINE) 100 MG tablet Take 1 tablet (100 mg) by mouth daily 90 tablet 3     No Known Allergies    OBJECTIVE:  BP (!) 142/92   Pulse 76   Temp 98.4  F (36.9  C)    "Resp 18   Ht 1.721 m (5' 7.75\")   Wt 81.1 kg (178 lb 12.8 oz)   SpO2 98%   BMI 27.39 kg/m      EXAM:  General Appearance: Alert. No acute distress  Chest/Respiratory Exam: Clear to auscultation bilaterally  Cardiovascular Exam: Regular rate and rhythm. S1, S2, no murmur, gallop, or rubs.  Extremities: 2+ pedal pulses.  No lower extremity edema.  Psychiatric: Normal affect and mentation        ASSESSMENT/PLAN:    ICD-10-CM    1. Benign essential hypertension  I10 metoprolol succinate ER (TOPROL-XL) 50 MG 24 hr tablet   2. Paroxysmal atrial fibrillation (H)  I48.0 metoprolol succinate ER (TOPROL-XL) 50 MG 24 hr tablet     We discussed that it appears he does not need all 3 blood pressure medications, but he certainly needs at least 1.  Given atrial fibrillation, he should be on some rate controlling medication.  Atenolol is not ideal option for blood pressure.  Recommend trying metoprolol XL 50 mg daily.  Stop the atenolol and change over to metoprolol.  Track blood pressure.  If readings are consistently over 140/90 then started on losartan.  If they are fluctuating below that level, then continue to track for couple weeks.  If above 130/80, then start the losartan 25 mg daily.  Likely around times of immunotherapy he will have some decrease in blood pressure as his appetite and intake is reduced, but with less medication he should be able to avoid the  hypotension that he has documented.  Call with questions.    Follow-up 1.5 months    Andrea Sherman MD    This document was prepared using a combination of typing and voice generated software.  While every attempt was made for accuracy, spelling and grammatical errors may exist.   "

## 2020-06-02 NOTE — PATIENT INSTRUCTIONS
Thank you for allowing Dr. Carl and our ENT team to participate in your care.  If your medications are too expensive, please give the nurse a call.  We can possibly change this medication.  If you have a scheduling or an appointment question please contact our Health Unit Coordinator at their direct line [phone].   ALL nursing questions or concerns can be directed to your ENT nurse at: [phone] - [place]    Left Ear Down Epley Maneuvers  Start [place] Exercises in 2 Weeks  If vertigo gets worse, call us to set up Vestibular Physical Therapy  Stop Ear Candling or putting anything in ears      CAWTHORNE'S HEAD EXERCISES    Exercises to be carried out for 15 minutes twice a day, increasing to 30 minutes.    Eye exercises:  Looking up, then down at first slowly, then quickly,       20 times.                Looking from one side to the other at first slowly,       then quickly, 20 times.     Focus on finger at arms length, moving finger one foot closer and     back again, 20 times.     Head exercises: Bend head forward then backward with eyes open slowly,       later quickly, 20 times.     Turn head from one side to the other - slowly, then quickly,      20 times.    Sitting:  While sitting, shrug shoulders, 20 times.      Turn shoulders to right, then to left, 20 times.     Bend forward and  objects from ground and sit up,      20 times.    Standing:   Change from sitting to standing and back again, 20 times       with eyes open.     Repeat with eyes closed.      Throw a small rubber ball from hand to hand above eye level.     Throw ball from hand to hand under one knee.    Moving about: Walk across room with eyes open, then closed, 10 times.      Walk up and down a slope with eyes open, then closed, 10 times.     Walk up and down steps with eyes open, then closed, 10 times.     Any game involving stooping and turning is good.

## 2020-06-02 NOTE — NURSING NOTE
Patient presents today for blood pressure concerns. He as been monitoring his blood pressures at home and they have been fluctuating.    Medication Reconciliation Complete    Gianna Crain LPN  6/2/2020 3:01 PM

## 2020-06-02 NOTE — PATIENT INSTRUCTIONS
Stop atenolol  Start metoprolol 50 mg daily    Keep the losartan and hydrochlorothiazide on hold for right now  Track blood pressure and in 2 weeks that will be a stable metoprolol dose.   If blood pressure is often over 130/80 then restart losartan 25 mg daily  If blood pressure is always over 140/90 right away with metoprolol, then also restart losartan    Call if having problems

## 2020-06-04 NOTE — TELEPHONE ENCOUNTER
He should take the Metoprolol XL 50 mg once daily and the Losartan 25 mg daily and continue to monitor his blood pressure.

## 2020-06-04 NOTE — TELEPHONE ENCOUNTER
Notified patients wife of providers note.  Kayla Domínguez LPN ....................  6/4/2020   3:39 PM

## 2020-06-04 NOTE — TELEPHONE ENCOUNTER
Was seen 6/2/20 and blood pressure medication was changed.  He accidentally took Metoprolol 50 mg 2 tablets in the morning for the last 2 days, he was only to take 1 tablet in the morning.  His blood pressures are today 6/4/20- 0645 - 134/95,  0745- 149/97 then he took 2  Metoprolol at that time at 0900 - 146/99, 1000- 132/92.  He has not taken a Losartan yet today.  He did take Losartan yesterday and blood pressure was 6/3/20- 0630- 135/96, 0800- 144/97 then he took 2 Metoprolol at that time, 0900 139-93, 1000-145/99 then he took a losartan, 1100-135/96, 1200- 137/95, 1600- 142/98, 1945-161/100, 2200- 125/83. What should he do? Should he continue taking Losartan today? Anything else they should do?

## 2020-06-10 NOTE — PROGRESS NOTES
Otolaryngology Consultation    Patient: Babar Laboy  : 1950    Patient presents with:  Allergies: Pt has been referred by Dr. Kelly for seasonal AR.      HPI:  Babar Laboy is a 69 year old male seen today for vertigo and left ear plugging    No nasal or allergy concerns    His symptoms include bilateral ear plugging, he used ear candling and improved on right, left still plugged    Long standing bilateral hearing loss, no sudden loss or change   He has a few seconds of vertigo with head movement or body movement in bed  No flux HL or bothersome tinnitus  No hx of otologic surgery or chronic otitis    Hx occupation noise exposure in past  No bothersome hearing loss causing difficulty with communication    No facial weakness or new dysphagia    Denies any chronic congestion or nasal concerns, no concerns with bothersome allergies    He does take prn Mucinex DM I(phenylephrine acetaminophen guaifenesin) as needed    history of tobacco use he quit 2019 prior to that 20-pack-year history      Significant history of small cell lung carcinoma with asymptomatic brain metastases he is currently on maintenance atexolixumab.      Imaging:  MRI brain dated 3/19/2020 Shows a caudal septal deviation to the left and compensatory right inferior turbinate hypertrophy.  The maxillary sinuses and sphenoid sinuses are grossly clear there is minimal anterior ethmoid mucoperiosteal ostial thickening the frontal sinuses are grossly clear on this MRI.    There is been significant improvement from prior MRI in the areas of enhancement in the brain.    He likes to be called Vyteris, he states everyone from [place] gets a nickname      Current Outpatient Rx   Medication Sig Dispense Refill     acetaminophen (TYLENOL) 500 MG tablet Take 1,000 mg by mouth every 6 hours as needed for mild pain       apixaban ANTICOAGULANT (ELIQUIS) 5 MG tablet Take 1 tablet (5 mg) by mouth 2 times daily 60 tablet 11     atorvastatin  (LIPITOR) 10 MG tablet TAKE 1 TABLET (10 MG) BY MOUTH DAILY 90 tablet 0     esomeprazole 40 MG PO DR capsule Take 1 capsule (40 mg) by mouth every morning (before breakfast) Take 30-60 minutes before eating. 30 capsule 3     gabapentin (NEURONTIN) 300 MG capsule Take 2 capsules (600 mg) by mouth 3 times daily 180 capsule 2     glutamine 500 MG TABS Take 500 mg by mouth daily 90 tablet 3     HEMP OIL OR EXTRACT OR OTHER CBD CANNABINOID, NOT MEDICAL CANNABIS,        lidocaine-prilocaine (EMLA) 2.5-2.5 % external cream Apply to port site 1 hour prior to needlesticks 30 g 1     LORazepam (ATIVAN) 0.5 MG tablet Take 1 tablet (0.5 mg) by mouth every 4 hours as needed (Anxiety, Nausea/Vomiting or Sleep) 30 tablet 3     losartan (COZAAR) 25 MG tablet Take 1 tablet (25 mg) by mouth daily 90 tablet 3     metoprolol succinate ER (TOPROL-XL) 50 MG 24 hr tablet Take 1 tablet (50 mg) by mouth daily 90 tablet 3     NONFORMULARY Take 1 capsule by mouth daily Probiotic Colon Supplement       oxyCODONE 5 MG PO tablet Take 1 tablet (5 mg) by mouth every 6 hours as needed for pain 60 tablet 0     Phenylephrine-APAP-guaiFENesin (MUCINEX FAST-MAX COLD & SINUS) -400 MG/20ML LIQD Take as needed BID       potassium chloride ER (K-TAB/KLOR-CON) 10 MEQ CR tablet TAKE 2 TABLETS (20 MEQ) BY MOUTH DAILY 60 tablet 1     prochlorperazine (COMPAZINE) 10 MG tablet Take 1 tablet (10 mg) by mouth every 6 hours as needed (Nausea/Vomiting) 30 tablet 3     sucralfate 1 GM/10ML PO suspension Take 10 mLs (1 g) by mouth 4 times daily 420 mL 3     triamcinolone (KENALOG) 0.1 % external cream Apply topically 2 times daily 30 g 1     vitamin B6 (PYRIDOXINE) 100 MG tablet Take 1 tablet (100 mg) by mouth daily 90 tablet 3       Allergies: Patient has no known allergies.     Past Medical History:   Diagnosis Date     Atrial fibrillation (H)      Encounter for general adult medical examination without abnormal findings     No Comments Provided     Essential  "(primary) hypertension     No Comments Provided     Other microscopic hematuria     No Comments Provided     Other problems related to lifestyle     4-5 beers/day     Residual hemorrhoidal skin tags     ,noted on colonoscopy     Rosacea     2011     Tobacco use     No Comments Provided     Unilateral inguinal hernia without obstruction or gangrene     2011       Past Surgical History:   Procedure Laterality Date     COLONOSCOPY      ,F/U      COLONOSCOPY  2016,F/U  tubular adenomas     ESOPHAGOSCOPY, GASTROSCOPY, DUODENOSCOPY (EGD), COMBINED N/A 3/2/2020    Procedure: ESOPHAGOGASTRODUODENOSCOPY, WITH Possible  DILATION;  Surgeon: Nitin Quiros MD;  Location: [facility] OR     INSERT PORT VASCULAR ACCESS Right 2019    Procedure: INSERTION, VASCULAR ACCESS PORT;  Surgeon: Nitin Quiros MD;  Location: [facility] OR     OTHER SURGICAL HISTORY      [id number],OTHER     OTHER SURGICAL HISTORY      [id number],OTHER     OTHER SURGICAL HISTORY      15,,HERNIA REPAIR,Right,RIH with mesh     RELEASE CARPAL TUNNEL      ,right       ENT family history reviewed    Social History     Tobacco Use     Smoking status: Former Smoker     Packs/day: 0.50     Years: 42.00     Pack years: 21.00     Types: Cigarettes     Last attempt to quit: 12/3/2019     Years since quittin.5     Smokeless tobacco: Never Used   Substance Use Topics     Alcohol use: Yes     Comment: 1 per month -beer     Drug use: No       Review of Systems  ROS: 10 point ROS neg other than the symptoms noted above in the HPI and palpitations eye floaters shortness of breath on exertion extremity weakness hair loss    Physical Exam  /70   Pulse 80   Temp 96.6  F (35.9  C) (Tympanic)   Ht 1.721 m (5' 7.75\")   Wt 80.7 kg (178 lb)   SpO2 98%   BMI 27.26 kg/m    General - The patient is well nourished and well developed, and appears to have good nutritional status.  Alert and oriented to person and place, answers " questions and cooperates with examination appropriately.  Polite, conversational  Head and Face - Normocephalic and atraumatic, with no gross asymmetry noted.  The facial nerve is intact, with strong symmetric movements.  No spontaneous nystagmus gait intact Romberg negative  Voice and Breathing - The patient was breathing comfortably without the use of accessory muscles. There was no wheezing, stridor, or stertor.  The patients voice was clear and strong, and had appropriate pitch and quality.  No jose peripheral digital clubbing or cyanosis   Ears -examined under microscopy bilaterally  The right external auditory canal is patent, the tympanic membrane intact without effusion, retraction or mass.  Bony landmarks are intact. Mild EAC exorciations  Left EAC Completely occluded with cerumen removed with loop.  The tympanic membrane is intact without effusion or retraction normal bony landmarks identified some slight excoriations to the ear canal Ciprodex applied  Eyes - Extraocular movements intact, and the pupils were reactive to light.  Sclera were not icteric or injected, conjunctiva were pink and moist.  Mouth - Examination of the oral cavity showed pink, healthy oral mucosa. No lesions or ulcerations noted.  The tongue was mobile and midline, and the dentition were in good condition.    Throat - The walls of the oropharynx were smooth, pink, moist, symmetric, and had no lesions or ulcerations.  The tonsillar pillars and soft palate were symmetric.  The uvula was midline on elevation.    Neck - No palpable enlarged fixed cervical lymph nodes.  No neck cysts or unusual tenderness to palpation.   No palpable fixed thyroid nodules or concerning goiter.  The trachea is grossly midline.   Nose - External contour is symmetric, no gross deflection or scars.  Nasal mucosa is pink and moist with no abnormal mucus.  The septum and turbinates were evaluated: DNS L, ITH worse right, non obstructive.  No polyps, masses, or  purulence noted on examination.       horacio hallpike maneuver performed with reproducible vertigo left ear down, right beating nystagmus, epley repositioning performed with left ear down with resolution of vertigo.  Patient tolerated procedure well.      Impression and Plan- Babar Laboy is a 69 year old male with:    ICD-10-CM    1. Benign paroxysmal positional vertigo of left ear  H81.12 CANALITH REPOSITIONING, PER DAY   2. Chronic non-infective otitis externa of both ears, unspecified type  H60.63 ofloxacin (FLOXIN) 0.3 % otic solution   3. Sensorineural hearing loss (SNHL), unspecified laterality  H90.5        Lu states he is hearing better out of his left ear     I discussed with Lu that his  benign paroxysmal positional vertigo of the left ear is not related to his metastatic lung cancer.  This can be easily addressed with home vestibular exercises which were given to him.  If he does not improve he should contact us and then would proceed with vestibular physical therapy    At this point he feels his hearing is adequate he declines an audiogram.  If his hearing declines he should follow-up for an audiogram    Hearing preservation reinforced    Stop Ear Candling or putting anything in ears other than prescribed drops    I appreciate the consultation and involvement with caring for this kind gentleman        Noelle Carl D.O.  Otolaryngology/Head and Neck Surgery  Allergy

## 2020-06-11 NOTE — NURSING NOTE
"Chief Complaint   Patient presents with     Allergies     Pt has been referred by Dr. Kelly for seasonal AR.       Initial /70   Pulse 80   Temp 96.6  F (35.9  C) (Tympanic)   Ht 1.721 m (5' 7.75\")   Wt 80.7 kg (178 lb)   SpO2 98%   BMI 27.26 kg/m   Estimated body mass index is 27.26 kg/m  as calculated from the following:    Height as of this encounter: 1.721 m (5' 7.75\").    Weight as of this encounter: 80.7 kg (178 lb).  Medication Reconciliation: complete  Sindhu Hernandez LPN  "

## 2020-06-11 NOTE — LETTER
2020         RE: Babar Laboy  [address]  [address]  [place] [address]        Dear Colleague,    Thank you for referring your patient, Babar Laboy, to the [facility]. Please see a copy of my visit note below.    Otolaryngology Consultation    Patient: Babar Laboy  : 1950    Patient presents with:  Allergies: Pt has been referred by Dr. Kelly for seasonal AR.      HPI:  Babar Laboy is a 69 year old male seen today for vertigo and left ear plugging    No nasal or allergy concerns    His symptoms include bilateral ear plugging, he used ear candling and improved on right, left still plugged    Long standing bilateral hearing loss, no sudden loss or change   He has a few seconds of vertigo with head movement or body movement in bed  No flux HL or bothersome tinnitus  No hx of otologic surgery or chronic otitis    Hx occupation noise exposure in past  No bothersome hearing loss causing difficulty with communication    No facial weakness or new dysphagia    Denies any chronic congestion or nasal concerns, no concerns with bothersome allergies    He does take prn Mucinex DM I(phenylephrine acetaminophen guaifenesin) as needed    history of tobacco use he quit 2019 prior to that 20-pack-year history      Significant history of small cell lung carcinoma with asymptomatic brain metastases he is currently on maintenance atexolixumab.      Imaging:  MRI brain dated 3/19/2020 Shows a caudal septal deviation to the left and compensatory right inferior turbinate hypertrophy.  The maxillary sinuses and sphenoid sinuses are grossly clear there is minimal anterior ethmoid mucoperiosteal ostial thickening the frontal sinuses are grossly clear on this MRI.    There is been significant improvement from prior MRI in the areas of enhancement in the brain.    He likes to be called [employer], he states everyone from [place] gets a nickname      Current Outpatient Rx    Medication Sig Dispense Refill     acetaminophen (TYLENOL) 500 MG tablet Take 1,000 mg by mouth every 6 hours as needed for mild pain       apixaban ANTICOAGULANT (ELIQUIS) 5 MG tablet Take 1 tablet (5 mg) by mouth 2 times daily 60 tablet 11     atorvastatin (LIPITOR) 10 MG tablet TAKE 1 TABLET (10 MG) BY MOUTH DAILY 90 tablet 0     esomeprazole 40 MG PO DR capsule Take 1 capsule (40 mg) by mouth every morning (before breakfast) Take 30-60 minutes before eating. 30 capsule 3     gabapentin (NEURONTIN) 300 MG capsule Take 2 capsules (600 mg) by mouth 3 times daily 180 capsule 2     glutamine 500 MG TABS Take 500 mg by mouth daily 90 tablet 3     HEMP OIL OR EXTRACT OR OTHER CBD CANNABINOID, NOT MEDICAL CANNABIS,        lidocaine-prilocaine (EMLA) 2.5-2.5 % external cream Apply to port site 1 hour prior to needlesticks 30 g 1     LORazepam (ATIVAN) 0.5 MG tablet Take 1 tablet (0.5 mg) by mouth every 4 hours as needed (Anxiety, Nausea/Vomiting or Sleep) 30 tablet 3     losartan (COZAAR) 25 MG tablet Take 1 tablet (25 mg) by mouth daily 90 tablet 3     metoprolol succinate ER (TOPROL-XL) 50 MG 24 hr tablet Take 1 tablet (50 mg) by mouth daily 90 tablet 3     NONFORMULARY Take 1 capsule by mouth daily Probiotic Colon Supplement       oxyCODONE 5 MG PO tablet Take 1 tablet (5 mg) by mouth every 6 hours as needed for pain 60 tablet 0     Phenylephrine-APAP-guaiFENesin (MUCINEX FAST-MAX COLD & SINUS) -400 MG/20ML LIQD Take as needed BID       potassium chloride ER (K-TAB/KLOR-CON) 10 MEQ CR tablet TAKE 2 TABLETS (20 MEQ) BY MOUTH DAILY 60 tablet 1     prochlorperazine (COMPAZINE) 10 MG tablet Take 1 tablet (10 mg) by mouth every 6 hours as needed (Nausea/Vomiting) 30 tablet 3     sucralfate 1 GM/10ML PO suspension Take 10 mLs (1 g) by mouth 4 times daily 420 mL 3     triamcinolone (KENALOG) 0.1 % external cream Apply topically 2 times daily 30 g 1     vitamin B6 (PYRIDOXINE) 100 MG tablet Take 1 tablet (100 mg) by  mouth daily 90 tablet 3       Allergies: Patient has no known allergies.     Past Medical History:   Diagnosis Date     Atrial fibrillation (H)      Encounter for general adult medical examination without abnormal findings     No Comments Provided     Essential (primary) hypertension     No Comments Provided     Other microscopic hematuria     No Comments Provided     Other problems related to lifestyle     4-5 beers/day     Residual hemorrhoidal skin tags     ,noted on colonoscopy     Rosacea     2011     Tobacco use     No Comments Provided     Unilateral inguinal hernia without obstruction or gangrene     2011       Past Surgical History:   Procedure Laterality Date     COLONOSCOPY      ,F/U      COLONOSCOPY  2016,F/U  tubular adenomas     ESOPHAGOSCOPY, GASTROSCOPY, DUODENOSCOPY (EGD), COMBINED N/A 3/2/2020    Procedure: ESOPHAGOGASTRODUODENOSCOPY, WITH Possible  DILATION;  Surgeon: Nitin Quiros MD;  Location: [facility] OR     INSERT PORT VASCULAR ACCESS Right 2019    Procedure: INSERTION, VASCULAR ACCESS PORT;  Surgeon: Nitin Quiros MD;  Location: [facility] OR     OTHER SURGICAL HISTORY      871856,OTHER     OTHER SURGICAL HISTORY      339016,OTHER     OTHER SURGICAL HISTORY      15,,HERNIA REPAIR,Right,RIH with mesh     RELEASE CARPAL TUNNEL      ,right       ENT family history reviewed    Social History     Tobacco Use     Smoking status: Former Smoker     Packs/day: 0.50     Years: 42.00     Pack years: 21.00     Types: Cigarettes     Last attempt to quit: 12/3/2019     Years since quittin.5     Smokeless tobacco: Never Used   Substance Use Topics     Alcohol use: Yes     Comment: 1 per month -beer     Drug use: No       Review of Systems  ROS: 10 point ROS neg other than the symptoms noted above in the HPI and palpitations eye floaters shortness of breath on exertion extremity weakness hair loss    Physical Exam  /70   Pulse 80   Temp  "96.6  F (35.9  C) (Tympanic)   Ht 1.721 m (5' 7.75\")   Wt 80.7 kg (178 lb)   SpO2 98%   BMI 27.26 kg/m    General - The patient is well nourished and well developed, and appears to have good nutritional status.  Alert and oriented to person and place, answers questions and cooperates with examination appropriately.  Polite, conversational  Head and Face - Normocephalic and atraumatic, with no gross asymmetry noted.  The facial nerve is intact, with strong symmetric movements.  No spontaneous nystagmus gait intact Romberg negative  Voice and Breathing - The patient was breathing comfortably without the use of accessory muscles. There was no wheezing, stridor, or stertor.  The patients voice was clear and strong, and had appropriate pitch and quality.  No jose peripheral digital clubbing or cyanosis   Ears -examined under microscopy bilaterally  The right external auditory canal is patent, the tympanic membrane intact without effusion, retraction or mass.  Bony landmarks are intact. Mild EAC exorciations  Left EAC Completely occluded with cerumen removed with loop.  The tympanic membrane is intact without effusion or retraction normal bony landmarks identified some slight excoriations to the ear canal Ciprodex applied  Eyes - Extraocular movements intact, and the pupils were reactive to light.  Sclera were not icteric or injected, conjunctiva were pink and moist.  Mouth - Examination of the oral cavity showed pink, healthy oral mucosa. No lesions or ulcerations noted.  The tongue was mobile and midline, and the dentition were in good condition.    Throat - The walls of the oropharynx were smooth, pink, moist, symmetric, and had no lesions or ulcerations.  The tonsillar pillars and soft palate were symmetric.  The uvula was midline on elevation.    Neck - No palpable enlarged fixed cervical lymph nodes.  No neck cysts or unusual tenderness to palpation.   No palpable fixed thyroid nodules or concerning goiter.  " The trachea is grossly midline.   Nose - External contour is symmetric, no gross deflection or scars.  Nasal mucosa is pink and moist with no abnormal mucus.  The septum and turbinates were evaluated: DNS L, ITH worse right, non obstructive.  No polyps, masses, or purulence noted on examination.       horacio hallpike maneuver performed with reproducible vertigo left ear down, right beating nystagmus, epley repositioning performed with left ear down with resolution of vertigo.  Patient tolerated procedure well.      Impression and Plan- Babar Laboy is a 69 year old male with:    ICD-10-CM    1. Benign paroxysmal positional vertigo of left ear  H81.12 CANALITH REPOSITIONING, PER DAY   2. Chronic non-infective otitis externa of both ears, unspecified type  H60.63 ofloxacin (FLOXIN) 0.3 % otic solution   3. Sensorineural hearing loss (SNHL), unspecified laterality  H90.5        Lu states he is hearing better out of his left ear     I discussed with Lu that his  benign paroxysmal positional vertigo of the left ear is not related to his metastatic lung cancer.  This can be easily addressed with home vestibular exercises which were given to him.  If he does not improve he should contact us and then would proceed with vestibular physical therapy    At this point he feels his hearing is adequate he declines an audiogram.  If his hearing declines he should follow-up for an audiogram    Hearing preservation reinforced    Stop Ear Candling or putting anything in ears other than prescribed drops    I appreciate the consultation and involvement with caring for this kind gentleman        Noelle Carl D.O.  Otolaryngology/Head and Neck Surgery  Allergy                    Again, thank you for allowing me to participate in the care of your patient.        Sincerely,        Noelle Carl MD

## 2020-06-15 NOTE — TELEPHONE ENCOUNTER
Lu having pain in spine since last week. Mild pain. Comes and goes. Worse with activity.  Would like Jeff Hamilton MD to be aware. Would like see Jeff Hamilton MD on Thursday while in infusion.  Zaria Man RN...........6/15/2020 11:13 AM

## 2020-06-18 NOTE — PROGRESS NOTES
Infusion Nursing Note:  Babar Laboy presents today for Tencentriq.    Patient seen by provider today: Yes: Dr. Hamilton met in infusion room to answer questions and concerns.   present during visit today: Not Applicable.    Note: Patient alerted oncologist of increased back pain yesterday.  Verbalized to this nurse and Dr. Christina that his back pain is better and may have been aggravated by mowing the lawn yesterday. Dr. Hamilton ordered a bone scan to rule out and changes.  Spouse sent a list of questions for oncologist and his nurse wrote responses/answer for spouse and given to patient for his spouse.      Intravenous Access:  Labs drawn without difficulty from port access.  Implanted Port accessed with brisk blood return.    Treatment Conditions:  Lab Results   Component Value Date    HGB 10.9 06/18/2020     Lab Results   Component Value Date    WBC 8.0 06/18/2020      Lab Results   Component Value Date    ANEU 5.6 06/18/2020     Lab Results   Component Value Date     06/18/2020      Lab Results   Component Value Date     06/18/2020                   Lab Results   Component Value Date    POTASSIUM 3.8 06/18/2020           Lab Results   Component Value Date    MAG 1.9 02/28/2020            Lab Results   Component Value Date    CR 0.70 06/18/2020                   Lab Results   Component Value Date    DONELL 9.3 06/18/2020                Lab Results   Component Value Date    BILITOTAL 0.5 06/18/2020           Lab Results   Component Value Date    ALBUMIN 3.8 06/18/2020                    Lab Results   Component Value Date    ALT 14 06/18/2020           Lab Results   Component Value Date    AST 15 06/18/2020       Results reviewed, labs MET treatment parameters, ok to proceed with treatment.      Post Infusion Assessment:  Patient tolerated infusion without incident.  Blood return noted pre and post infusion.  Site patent and intact, free from redness, edema or discomfort.  No evidence of  extravasations.  Access discontinued per protocol.  Biologic Infusion Post Education: Call the triage nurse at your clinic or seek medical attention if you have chills and/or temperature greater than or equal to 100.5, uncontrolled nausea/vomiting, diarrhea, constipation, dizziness, shortness of breath, chest pain, heart palpitations, weakness or any other new or concerning symptoms, questions or concerns.  You cannot have any live virus vaccines prior to or during treatment or up to 6 months post infusion.  If you have an upcoming surgery, medical procedure or dental procedure during treatment, this should be discussed with your ordering physician and your surgeon/dentist.  If you are having any concerning symptom, if you are unsure if you should get your next infusion or wish to speak to a provider before your next infusion, please call your care coordinator or triage nurse at your clinic to notify them so we can adequately serve you.       Discharge Plan:   Patient and/or family verbalized understanding of discharge instructions and all questions answered.  AVS to patient via [employer]. Patient has studies ordered and will follow up with Dr. Hamilton after scans.  Patient will return 7/9 for next appointment.   Patient discharged in stable condition accompanied by: self.  Departure Mode: Ambulatory.    Olga Maier RN

## 2020-06-26 NOTE — TELEPHONE ENCOUNTER
Please call Julianne.  Lu has been more tired last couple days and they are wondering if he should have some fluid before weekend.

## 2020-06-26 NOTE — PROGRESS NOTES
"Infusion Nursing Note:  Babar Laboy presents today for fluids\" due to feeling rotten\".    Patient seen by provider today: No   present during visit today: Not Applicable.    Note: N/A.    Intravenous Access:  Labs drawn without difficulty for upcoming appointment.  Implanted Port.    Treatment Conditions:  Not Applicable.      Post Infusion Assessment:  Patient tolerated infusion without incident.  Blood return noted pre and post infusion.  Site patent and intact, free from redness, edema or discomfort.  No evidence of extravasations.  Access discontinued per protocol.       Discharge Plan:   Discharge instructions reviewed with: Patient.  Patient and/or family verbalized understanding of discharge instructions and all questions answered.  Patient discharged in stable condition accompanied by: self.  Departure Mode: Ambulatory.    Jean S. Hammann, RN                        "

## 2020-06-26 NOTE — TELEPHONE ENCOUNTER
Patient will come for fluids today for fatigue and weakness.  Zaria Man RN...........6/26/2020 8:40 AM

## 2020-07-02 NOTE — TELEPHONE ENCOUNTER
Patient's wife is calling in regards to the patient being weak and not his normal self. She would like to speak with a nurse regarding his symptoms to see if there is something that can be done for the patient.     Please call wife Julianne

## 2020-07-02 NOTE — TELEPHONE ENCOUNTER
After birth date was verified, wife states he mowed the lawn on the rider yesterday and he slept almost all day today.  He is so weak and exhausted already.  Decreased appetite (1 meal per day) and minimal back pain (1/10).  After fluids last week, he perked up until yesterday.  Thinking he should get fluids again and can do that today or tomorrow.  Worried about worsening over the weekend and not making his scans on Monday and Tuesday next week.  Lizz Corral [facility] ([facility])................ 7/2/2020 2:30 PM

## 2020-07-03 NOTE — PROGRESS NOTES
Infusion Nursing Note:  Babar Laboy presents today for fluid bolus.    Patient seen by provider today: No   present during visit today: Not Applicable.    Note: patient complains of being tired all the time and unable to do very much with out becoming exhausted, I discussed cancer rehab therapy and he said he wants to wait until after the scans to see what is going on.    Intravenous Access:  Implanted Port.    Treatment Conditions:  Not Applicable.      Post Infusion Assessment:  Patient tolerated infusion without incident.  Blood return noted pre and post infusion.  Site patent and intact, free from redness, edema or discomfort.  No evidence of extravasations.  Access discontinued per protocol.       Discharge Plan:   Discharge instructions reviewed with: Patient.  Patient and/or family verbalized understanding of discharge instructions and all questions answered.  Patient discharged in stable condition accompanied by: self.  Departure Mode: Ambulatory.    Jean S. Hammann, RN

## 2020-07-09 NOTE — NURSING NOTE
"Chief Complaint   Patient presents with     RECHECK     Lung cancer with brain mets       Initial BP (!) 142/96   Pulse 67   Temp 98.2  F (36.8  C) (Oral)   Resp 18   Ht 1.721 m (5' 7.76\")   Wt 72.6 kg (160 lb)   SpO2 93%   BMI 24.50 kg/m   Estimated body mass index is 24.5 kg/m  as calculated from the following:    Height as of this encounter: 1.721 m (5' 7.76\").    Weight as of this encounter: 72.6 kg (160 lb).  Medication Reconciliation: complete    Lizz Corral, FRANSISCO ([facility])  "

## 2020-07-10 NOTE — PROGRESS NOTES
Visit Date:   07/09/2020      HISTORY OF PRESENT ILLNESS:  Mr. Laboy returns for followup of extensive stage small cell lung carcinoma with asymptomatic brain metastasis.  For details, please see previous notes.  He is currently on maintenance atezolizumab.  He had received a dose of atezolizumab on 03/31 and apparently developed a rash over the next 2 days, which progressed to the point where it enveloped most of his body, was pruritic and involved both upper extremities and lower extremities, trunk and palms of the hands, also possibly had increasing neuropathy in his feet.  When we saw the patient, we felt he had a checkpoint inhibitor rash secondary to atezolizumab and treated him with Solu-Medrol 125 mg IV and started the patient on prednisone 40 mg p.o. b.i.d. for 2 weeks.  When we saw the patient subsequently on 04/16, his rash was almost gone.  We elected to taper the prednisone over 2 weeks and then resume atezolizumab.  We also recommended prophylactic cranial radiation.  The patient was seen by Dr. Duy Kelly.  The plan was to start PCI.  He began prophylactic cranial radiation on 05/04.  Otherwise, he had completed 4 cycles of maintenance Tecentriq, and he was due to receive his fifth cycle today.  He had staging studies done on 07/07, including CT chest, abdomen and pelvis.  CT chest revealed postradiation changes that had worsened significantly from 03/2020.  CT of the abdomen and pelvis revealed that there was worsening disease with a new 2 cm right adrenal gland nodule and new nodules in the left adrenal gland, ranging in size from 8 mm to 10 mm concerning for metastatic disease.  There was a 3.6 x 3 cm enlarged mesenteric node in the left upper quadrant and a 1.3 cm large mesenteric node in the left lower quadrant that were new when compared to 03/18/2020 PET CT and concerning for metastatic involvement.  Bone scan revealed sclerosis of the T8 vertebral body in the medial left iliac bone.  They were  all sclerotic.  The feeling was that these lesions reflected healing metastatic disease.  MRI of the brain was done on 07/06 and revealed that there was worsening intracranial metastatic disease when compared to the prior.  There was a new lesion that measured 4 mm in the right external capsule, new when compared to the prior.  Otherwise, there are 16 discrete punctate foci of abnormal enhancement present.  The patient comes in today, says he has been losing weight, at least 20 pounds over the past month.  His appetite is poor.  His performance status has declined.  He denies any shortness of breath.  He does describe hoarseness.  No fevers or night sweats.  He gets occasional back pain and left-sided abdominal pain.      PHYSICAL EXAMINATION:   GENERAL:  He is a middle-aged white male in no acute distress.   VITAL SIGNS:  Blood pressure 142/96, pulse 67, respirations 18, temperature 98.2.  ECOG performance status is 1.   HEENT:  Atraumatic, normocephalic.  Oropharynx reveals dry oral mucosa.   NECK:  Supple.   LUNGS:  Clear to auscultation and percussion.   HEART:  Regular rhythm.  S1, S2 normal.   ABDOMEN:  Soft, some tenderness over the left upper quadrant, no rebound.   LYMPHATICS:  No cervical, supraclavicular or axillary nodes.   EXTREMITIES:  No edema.   NEUROLOGIC:  Nonfocal.      LABORATORY DATA:  CBC:  White count 6.1, H and H 11.1 and 32.9, platelet count is 240.  Sodium 133, BUN 11, creatinine 0.64.      IMPRESSION:  Extensive stage small cell lung carcinoma with asymptomatic brain metastasis.  The patient was deemed a candidate for extensive stage disease treated with carboplatin, etoposide, and atezolizumab.  The plan was to proceed with 4 cycles, then restage with MRI of the brain as well as PET scan.  Initial PET scan revealed there was a 5.8 cm primary left hilar small cell carcinoma as well as subcarinal right thoracic inlet and right upper abdominal metastatic lymphadenopathy as well as metastatic  right adrenal nodule as well as T8 metastases.  The patient was seen by Radiation Oncology and was deemed a candidate for palliative radiation to T8 as well as primary left hilar mass.  The patient went on to complete radiation therapy.  Course was complicated by esophagitis, dehydration, hyponatremia, hypokalemia as well as neutropenia requiring IV hydration and potassium replacement as well as neutropenia requiring Neulasta.  The patient completed 4 cycles.  PET scan indicated essentially complete response.  MRI of the brain showed a near complete response.  The patient was started on maintenance atezolizumab and developed checkpoint inhibitor rash, which resulted in delay of atezolizumab.  He was treated with steroids, which resulted in resuming treatment with steroid and then resumed atezolizumab.  The patient completed PCI under direction of Dr. Duy Kelly.  After 4 maintenance dose of atezolizumab, now he has evidence of progression in the abdomen and adrenals as well as the brain.  He is a candidate for second-line therapy with topotecan at a dose of 4 mg/m2 weekly on day 1, 8, 15 and 22 of a 28-day cycle.  The plan is to proceed with 3 cycles, then restage.  We discussed side effects, including cytopenias, risk of infection, nausea, vomiting.  The patient is willing to proceed.      Forty minutes was spent with the patient, greater than half the time spent in counseling and coordination of care.         ALANA NANCE MD             D: 2020   T: 2020   MT: MARCELA      Name:     BABAR GODINEZ   MRN:      [id number]        Account:      [id number]   :      1950           Visit Date:   2020      Document: [id number]       cc: Babar Grady MD

## 2020-07-10 NOTE — PROGRESS NOTES
Infusion Nursing Note:  Babar Laboy presents today for fluid bolus.    Patient seen by provider today: No   present during visit today: Not Applicable.    Note: patient states the fluids seam to help, his sodium level has been trending low so it may help VSS.    Intravenous Access:  Implanted Port.    Treatment Conditions:  Not Applicable.      Post Infusion Assessment:  Patient tolerated infusion without incident.  Blood return noted pre and post infusion.  Site patent and intact, free from redness, edema or discomfort.  No evidence of extravasations.  Access discontinued per protocol.       Discharge Plan:   Discharge instructions reviewed with: Patient.  Patient and/or family verbalized understanding of discharge instructions and all questions answered.  Copy of AVS reviewed with patient and/or family.  Patient will return 7/20/20 for first new treatment for next appointment.  Patient discharged in stable condition accompanied by: wife.  Departure Mode: Ambulatory.    Jean S. Hammann, RN

## 2020-07-13 NOTE — PROGRESS NOTES
Infusion Nursing Note:  Babar Laboy presents today for fluids.    Patient seen by provider today: Yes: appointment made with primary after fluid bolus   present during visit today: Not Applicable.    Note: N/A.    Intravenous Access:  Labs drawn without difficulty.  Implanted Port.    Treatment Conditions:  Not Applicable.      Post Infusion Assessment:  Patient tolerated infusion without incident.  Blood return noted pre and post infusion.  Site patent and intact, free from redness, edema or discomfort.  No evidence of extravasations.  Access discontinued per protocol.       Discharge Plan:   Patient and/or family verbalized understanding of discharge instructions and all questions answered.  Patient discharged in stable condition accompanied by: wife.  Departure Mode: Ambulatory.    Jean S. Hammann, RN

## 2020-07-13 NOTE — PROGRESS NOTES
"Nursing Notes:   Tavia Merritt LPN  7/13/2020  4:05 PM  Signed  Chief Complaint   Patient presents with     Fatigue     Shortness of Breath     Anorexia   Pt present to clinic today for worsening shortness of breath and fatigue. Patient came from infusion.    Initial /68 (BP Location: Right arm, Patient Position: Sitting, Cuff Size: Adult Regular)   Pulse 88   Temp 97.6  F (36.4  C) (Tympanic)   Resp 18   SpO2 98%  Estimated body mass index is 23.92 kg/m  as calculated from the following:    Height as of 7/9/20: 1.721 m (5' 7.76\").    Weight as of an earlier encounter on 7/13/20: 70.9 kg (156 lb 3.2 oz).  Medication Reconciliation: complete    Tavia Merritt LPN    SUBJECTIVE:  69 year old male with metastatic small cell lung cancer presents for multiple symptoms new for the past week.   Last week he completed a CT chest and abdomen for cancer surveillance.  Since then he has developed fatigue, lower abdominal pain, diarrhea. More SOB. Mild cough, some phlegm. No fever.  He was having diarrhea previously at times.  Has been nauseated, but able to drink liquids.  Presented to infusion today for IV fluids ordered by oncology.  However, RN there felt he should be evaluated based on symptoms.    REVIEW OF SYSTEMS:    Constitutional: malaise  Eyes: negative  Ears, nose, mouth, throat, and face: negative  Respiratory: as above  Cardiovascular: negative  Gastrointestinal: as above  Genitourinary:negative    Past Medical History:   Diagnosis Date     Atrial fibrillation (H)      Encounter for general adult medical examination without abnormal findings     No Comments Provided     Essential (primary) hypertension     No Comments Provided     Other microscopic hematuria     No Comments Provided     Other problems related to lifestyle     4-5 beers/day     Residual hemorrhoidal skin tags     12/05,noted on colonoscopy     Rosacea     12/1/2011     Tobacco use     No Comments Provided     Unilateral inguinal " hernia without obstruction or gangrene     12/1/2011       Past Surgical History:   Procedure Laterality Date     COLONOSCOPY      12/05,F/U 2015     COLONOSCOPY  04/25/2016 4/25/16,F/U 2021 tubular adenomas     ESOPHAGOSCOPY, GASTROSCOPY, DUODENOSCOPY (EGD), COMBINED N/A 3/2/2020    Procedure: ESOPHAGOGASTRODUODENOSCOPY, WITH Possible  DILATION;  Surgeon: Nitin Quiros MD;  Location: [facility] OR     INSERT PORT VASCULAR ACCESS Right 12/23/2019    Procedure: INSERTION, VASCULAR ACCESS PORT;  Surgeon: Nitin Quiros MD;  Location: [facility] OR     OTHER SURGICAL HISTORY      [id number],OTHER     OTHER SURGICAL HISTORY      [id number],OTHER     OTHER SURGICAL HISTORY      1/29/13,,HERNIA REPAIR,Right,RIH with mesh     RELEASE CARPAL TUNNEL      2003,right        Current Outpatient Medications   Medication Sig Dispense Refill     acetaminophen (TYLENOL) 500 MG tablet Take 1,000 mg by mouth every 6 hours as needed for mild pain       apixaban ANTICOAGULANT (ELIQUIS) 5 MG tablet Take 1 tablet (5 mg) by mouth 2 times daily 60 tablet 11     atorvastatin (LIPITOR) 10 MG tablet TAKE 1 TABLET (10 MG) BY MOUTH DAILY 90 tablet 0     gabapentin (NEURONTIN) 300 MG capsule Take 2 capsules (600 mg) by mouth 3 times daily 180 capsule 2     HEMP OIL OR EXTRACT OR OTHER CBD CANNABINOID, NOT MEDICAL CANNABIS,        loperamide (IMODIUM) 2 MG capsule Take 2 mg by mouth 4 times daily as needed for diarrhea       losartan (COZAAR) 25 MG tablet Take 1 tablet (25 mg) by mouth daily 90 tablet 3     metoprolol succinate ER (TOPROL-XL) 50 MG 24 hr tablet Take 1 tablet (50 mg) by mouth daily 90 tablet 3     NONFORMULARY Take 1 capsule by mouth daily Probiotic Colon Supplement       potassium chloride ER (K-TAB/KLOR-CON) 10 MEQ CR tablet TAKE 2 TABLETS (20 MEQ) BY MOUTH DAILY 60 tablet 1     prochlorperazine (COMPAZINE) 10 MG tablet Take 1 tablet (10 mg) by mouth every 6 hours as needed (Nausea/Vomiting) 30 tablet 2     triamcinolone (KENALOG) 0.1 %  external cream Apply topically 2 times daily 30 g 1     vitamin B6 (PYRIDOXINE) 100 MG tablet Take 1 tablet (100 mg) by mouth daily 90 tablet 3     cetirizine (ZYRTEC) 10 MG tablet Take 10 mg by mouth daily       lidocaine-prilocaine (EMLA) 2.5-2.5 % external cream Apply to port site 1 hour prior to needlesticks (Patient not taking: Reported on 7/13/2020) 30 g 1     LORazepam (ATIVAN) 0.5 MG tablet Take 1 tablet (0.5 mg) by mouth every 4 hours as needed (Anxiety, Nausea/Vomiting or Sleep) (Patient not taking: Reported on 7/10/2020) 30 tablet 2     oxyCODONE 5 MG PO tablet Take 1 tablet (5 mg) by mouth every 6 hours as needed for pain (Patient not taking: Reported on 7/13/2020) 60 tablet 0     Phenylephrine-APAP-guaiFENesin (MUCINEX FAST-MAX COLD & SINUS) -400 MG/20ML LIQD Take as needed BID       No Known Allergies    OBJECTIVE:  /68 (BP Location: Right arm, Patient Position: Sitting, Cuff Size: Adult Regular)   Pulse 88   Temp 97.6  F (36.4  C) (Tympanic)   Resp 18   SpO2 98%     EXAM:  General Appearance: Alert. No acute distress  Chest/Respiratory Exam: Clear to auscultation bilaterally  Cardiovascular Exam: Irregularly irregular rhythm.  S1, S2, no murmur, gallop, or rubs.  Gastrointestinal Exam: Soft, mild LLQ tenderness  Extremities: No lower extremity edema.  Psychiatric: Normal affect and mentation    Results for orders placed or performed during the hospital encounter of 07/13/20   CBC with platelets differential     Status: Abnormal   Result Value Ref Range    WBC 6.6 4.0 - 11.0 10e9/L    RBC Count 3.80 (L) 4.4 - 5.9 10e12/L    Hemoglobin 11.8 (L) 13.3 - 17.7 g/dL    Hematocrit 34.7 (L) 40.0 - 53.0 %    MCV 91 78 - 100 fl    MCH 31.1 26.5 - 33.0 pg    MCHC 34.0 31.5 - 36.5 g/dL    RDW 14.7 10.0 - 15.0 %    Platelet Count 308 150 - 450 10e9/L    Diff Method Automated Method     % Neutrophils 68.9 %    % Lymphocytes 13.3 %    % Monocytes 14.8 %    % Eosinophils 1.2 %    % Basophils 0.6 %    %  Immature Granulocytes 1.2 %    Absolute Neutrophil 4.5 1.6 - 8.3 10e9/L    Absolute Lymphocytes 0.9 0.8 - 5.3 10e9/L    Absolute Monocytes 1.0 0.0 - 1.3 10e9/L    Absolute Eosinophils 0.1 0.0 - 0.7 10e9/L    Absolute Basophils 0.0 0.0 - 0.2 10e9/L    Abs Immature Granulocytes 0.1 0 - 0.4 10e9/L   Comprehensive metabolic panel     Status: Abnormal   Result Value Ref Range    Sodium 136 134 - 144 mmol/L    Potassium 3.9 3.5 - 5.1 mmol/L    Chloride 106 98 - 107 mmol/L    Carbon Dioxide 17 (L) 21 - 31 mmol/L    Anion Gap 13 3 - 14 mmol/L    Glucose 90 70 - 105 mg/dL    Urea Nitrogen 14 7 - 25 mg/dL    Creatinine 0.71 0.70 - 1.30 mg/dL    GFR Estimate >90 >60 mL/min/[1.73_m2]    GFR Estimate If Black >90 >60 mL/min/[1.73_m2]    Calcium 9.0 8.6 - 10.3 mg/dL    Bilirubin Total 0.6 0.3 - 1.0 mg/dL    Albumin 3.6 3.5 - 5.7 g/dL    Protein Total 6.2 (L) 6.4 - 8.9 g/dL    Alkaline Phosphatase 67 34 - 104 U/L    ALT 19 7 - 52 U/L    AST 14 13 - 39 U/L   CRP inflammation     Status: None   Result Value Ref Range    CRP Inflammation 0.3 <0.5 mg/L   Procalcitonin     Status: None   Result Value Ref Range    Procalcitonin 1.85 ng/ml   Lactic acid whole blood     Status: None   Result Value Ref Range    Lactic Acid 1.0 0.7 - 2.0 mmol/L   Results for orders placed or performed in visit on 07/13/20   UA reflex to Microscopic and Culture     Status: Abnormal    Specimen: Midstream Urine   Result Value Ref Range    Color Urine Yellow     Appearance Urine Clear     Glucose Urine Negative NEG^Negative mg/dL    Bilirubin Urine Negative NEG^Negative    Ketones Urine >150 (A) NEG^Negative mg/dL    Specific Gravity Urine 1.028 1.003 - 1.035    Blood Urine Trace (A) NEG^Negative    pH Urine 5.5 5.0 - 7.0 pH    Protein Albumin Urine 30 (A) NEG^Negative mg/dL    Urobilinogen mg/dL 2.0 0.0 - 2.0 mg/dL    Nitrite Urine Negative NEG^Negative    Leukocyte Esterase Urine Negative NEG^Negative    Source Midstream Urine     RBC Urine 3 (H) 0 - 2 /HPF     WBC Urine 1 0 - 5 /HPF    Bacteria Urine Few (A) NEG^Negative /HPF    Mucous Urine Present (A) NEG^Negative /LPF    Hyaline Casts 12 (H) 0 - 2 /LPF        ASSESSMENT/PLAN:    ICD-10-CM    1. Abdominal pain, generalized  R10.84 UA reflex to Microscopic and Culture     CBC and Differential     CRP inflammation     Procalcitonin     Labs ordered through infusion.  Urine normal.  White count and  CRP are not elevated.  Procalcitonin is elevated, but there is no clear source.  Pneumonia or diverticulitis are most likely possibilities.  His symptoms are stable for the past week.  We discussed potentially treating diverticulitis versus observation.  He feels better after fluids.  Recommend close follow-up.  He will return in 2 days for repeat labs and an appointment. If significantly worse, then go to ED    Greater than 50% of this 25 minute appointment spent on counseling and coordination of care    Andrea Sherman MD    This document was prepared using a combination of typing and voice generated software.  While every attempt was made for accuracy, spelling and grammatical errors may exist.

## 2020-07-13 NOTE — TELEPHONE ENCOUNTER
No oncology provider in [place] until Wednesday. Will come for fluids today at 2 pm. Advised to be seen by Mario Grady if abdominal pain continues or worsens.  Zaria Man RN...........7/13/2020 10:00 AM

## 2020-07-15 NOTE — NURSING NOTE
Patient is here to follow up and compare lab results.    Medication Reconciliation: complete    Breana Sahni LPN  7/15/2020 10:00 AM

## 2020-07-15 NOTE — PATIENT INSTRUCTIONS
Start omeprazole 20 mg daily to protect stomach and see if this helps appetite. Take for 1 month and stop    An antibiotic infusion today  Start azithromycin today    Recheck lab on Monday before chemo

## 2020-07-15 NOTE — PROGRESS NOTES
Nursing Notes:   Breana Sahni LPN  7/15/2020 10:00 AM  Sign at exiting of workspace  Patient is here to follow up and compare lab results.    Medication Reconciliation: complete    Breana HUMBLELeonie Sahni LPN  7/15/2020 10:00 AM      SUBJECTIVE:  69 year old male with metastatic small cell lung cancer presents to follow up on fatigue, lower abdominal pain, diarrhea, mild shortness of breath, mild cough.  Potentially has pneumonia versus diverticulitis.  He had normal white count and CRP 2 days ago, but elevated procalcitonin. Lactate was normal.  Had diarrhea previously.  Nausea is new.  No fever.  We elected to monitor rather than try antibiotics for unclear etiology.  He is about the same as 2 days ago.  Cough may be better per wife.   Still has poor appetite and is going to have another infusion today of IV fluids.      REVIEW OF SYSTEMS:    Pertinent items are noted in HPI.    Current Outpatient Medications   Medication Sig Dispense Refill     acetaminophen (TYLENOL) 500 MG tablet Take 1,000 mg by mouth every 6 hours as needed for mild pain       apixaban ANTICOAGULANT (ELIQUIS) 5 MG tablet Take 1 tablet (5 mg) by mouth 2 times daily 60 tablet 11     atorvastatin (LIPITOR) 10 MG tablet TAKE 1 TABLET (10 MG) BY MOUTH DAILY 90 tablet 0     gabapentin (NEURONTIN) 300 MG capsule Take 2 capsules (600 mg) by mouth 3 times daily 180 capsule 2     HEMP OIL OR EXTRACT OR OTHER CBD CANNABINOID, NOT MEDICAL CANNABIS,        loperamide (IMODIUM) 2 MG capsule Take 2 mg by mouth 4 times daily as needed for diarrhea       metoprolol succinate ER (TOPROL-XL) 50 MG 24 hr tablet Take 1 tablet (50 mg) by mouth daily 90 tablet 3     NONFORMULARY Take 1 capsule by mouth daily Probiotic Colon Supplement       potassium chloride ER (K-TAB/KLOR-CON) 10 MEQ CR tablet Take 2 tablets (20 mEq) by mouth daily 60 tablet 1     prochlorperazine (COMPAZINE) 10 MG tablet Take 1 tablet (10 mg) by mouth every 6 hours as needed (Nausea/Vomiting) 30  tablet 2     triamcinolone (KENALOG) 0.1 % external cream Apply topically 2 times daily 30 g 1     vitamin B6 (PYRIDOXINE) 100 MG tablet Take 1 tablet (100 mg) by mouth daily 90 tablet 3     cetirizine (ZYRTEC) 10 MG tablet Take 10 mg by mouth daily       lidocaine-prilocaine (EMLA) 2.5-2.5 % external cream Apply to port site 1 hour prior to needlesticks (Patient not taking: Reported on 7/13/2020) 30 g 1     LORazepam (ATIVAN) 0.5 MG tablet Take 1 tablet (0.5 mg) by mouth every 4 hours as needed (Anxiety, Nausea/Vomiting or Sleep) (Patient not taking: Reported on 7/10/2020) 30 tablet 2     losartan (COZAAR) 25 MG tablet Take 1 tablet (25 mg) by mouth daily 90 tablet 3     oxyCODONE 5 MG PO tablet Take 1 tablet (5 mg) by mouth every 6 hours as needed for pain (Patient not taking: Reported on 7/13/2020) 60 tablet 0     Phenylephrine-APAP-guaiFENesin (MUCINEX FAST-MAX COLD & SINUS) -400 MG/20ML LIQD Take as needed BID       No Known Allergies    OBJECTIVE:  /86 (BP Location: Right arm, Patient Position: Sitting, Cuff Size: Adult Regular)   Pulse 78   Temp 97.6  F (36.4  C) (Tympanic)   Resp 16   Wt 70.9 kg (156 lb 6.4 oz)   SpO2 97%   BMI 23.95 kg/m      EXAM:  General Appearance: Alert. No acute distress. Appears weak  Chest/Respiratory Exam: Clear to auscultation bilaterally  Cardiovascular Exam: Regular rate and rhythm. S1, S2, no murmur, gallop, or rubs.  Abdomen: Soft, nontender  Extremities: No lower extremity edema.  Psychiatric: Normal affect and mentation    Results for orders placed or performed during the hospital encounter of 07/15/20   XR Chest 2 Views     Status: None    Narrative    PROCEDURE:  XR CHEST 2 VW    HISTORY:  Cough; Small cell lung cancer (H).     COMPARISON:  December 2019    FINDINGS:   The cardiac silhouette is normal in size. The pulmonary vasculature is  normal.  The left infrahilar mass has improved significantly from  previous examination. There is increasing density  surrounding the  hilus most likely representing scarring. There is tenting of the right  hemidiaphragm.      Impression    IMPRESSION: Probable left lung scarring. No active pulmonary  infiltrates are seen    FRANTZ TOVAR MD   Results for orders placed or performed in visit on 07/15/20   Procalcitonin     Status: None   Result Value Ref Range    Procalcitonin 2.32 ng/ml   CRP inflammation     Status: None   Result Value Ref Range    CRP Inflammation 0.3 <0.5 mg/L   CBC and Differential     Status: Abnormal   Result Value Ref Range    WBC 7.6 4.0 - 11.0 10e9/L    RBC Count 4.23 (L) 4.4 - 5.9 10e12/L    Hemoglobin 13.0 (L) 13.3 - 17.7 g/dL    Hematocrit 38.8 (L) 40.0 - 53.0 %    MCV 92 78 - 100 fl    MCH 30.7 26.5 - 33.0 pg    MCHC 33.5 31.5 - 36.5 g/dL    RDW 14.7 10.0 - 15.0 %    Platelet Count 254 150 - 450 10e9/L    Diff Method Automated Method     % Neutrophils 73.9 %    % Lymphocytes 11.9 %    % Monocytes 10.8 %    % Eosinophils 1.8 %    % Basophils 0.4 %    % Immature Granulocytes 1.2 %    Absolute Neutrophil 5.6 1.6 - 8.3 10e9/L    Absolute Lymphocytes 0.9 0.8 - 5.3 10e9/L    Absolute Monocytes 0.8 0.0 - 1.3 10e9/L    Absolute Eosinophils 0.1 0.0 - 0.7 10e9/L    Absolute Basophils 0.0 0.0 - 0.2 10e9/L    Abs Immature Granulocytes 0.1 0 - 0.4 10e9/L        ASSESSMENT/PLAN:    ICD-10-CM    1. Cough  R05 XR Chest 2 Views     azithromycin (ZITHROMAX) 250 MG tablet     Procalcitonin   2. Small cell lung cancer (H)  C34.90 XR Chest 2 Views   3. Paroxysmal atrial fibrillation (H)  I48.0 apixaban ANTICOAGULANT (ELIQUIS ANTICOAGULANT) 5 MG tablet   4. Nausea  R11.0 omeprazole (PRILOSEC) 20 MG DR capsule   5. Elevated procalcitonin  R79.89 Procalcitonin       He still has various symptoms without clear etiology.  Potentially everything relates to underlying cancer.  However, he worsened about 1 week ago.  Recommend chest x-ray to see if this reveals a pneumonia.  May be difficult to see any abnormality based on  underlying lung scarring and previous abnormalities.  Chest x-ray did not show any pneumonia.    White count and CRP remained stable.  Procalcitonin has increased.  He is not having left lower quadrant pain any longer, so diverticulitis is less likely.  Loose stools have been present before.  This can be associated with some bloating.  Continues to have some shortness of breath.  2 days ago he was in atrial fibrillation, but today has normal sinus rhythm and still has the shortness of breath.    Given elevated procalcitonin and new shortness of breath, recommend trial treatment for pneumonia.  He is starting chemotherapy next week and if pneumonia is present, he will worsen.  Given Rocephin 1 g IV during IV fluid infusion today.  Start on azithromycin.  Plan to recheck procalcitonin Monday prior to chemotherapy.  If he improves and procalcitonin improved, likely confirms pneumonia.  If not any better with persistent elevated procalcitonin level, this may all just relate to cancer.  This was all discussed with patient and his wife.    Greater than 50% of this 26 minute appointment spent on counseling and coordination of care    Andrea Sherman MD    This document was prepared using a combination of typing and voice generated software.  While every attempt was made for accuracy, spelling and grammatical errors may exist.

## 2020-07-15 NOTE — PROGRESS NOTES
Infusion Nursing Note:  Babar Laboy presents today for IV fluids and Rocephin.    Patient seen by provider today: Yes: Dr. Sherman   present during visit today: Not Applicable.    Note: N/A.    Intravenous Access:  Implanted Port.    Treatment Conditions:  Not Applicable.      Post Infusion Assessment:  Patient tolerated infusion without incident.  Blood return noted pre and post infusion.  Site patent and intact, free from redness, edema or discomfort.  No evidence of extravasations.  Access discontinued per protocol.       Discharge Plan:   Discharge instructions reviewed with: Patient.  Patient discharged in stable condition accompanied by: self.  Departure Mode: Ambulatory.  Patient declined AVS will return 7/20/2020 for next infusion.    Lilian Ingram RN

## 2020-07-16 NOTE — TELEPHONE ENCOUNTER
Stay with Dronabinol please. This is for stimulating his appetite. Has ativan,compazine at home. Thank you  Zaria Man RN...........7/16/2020 11:18 AM

## 2020-07-16 NOTE — TELEPHONE ENCOUNTER
Good morning. I am doing PA for Dronabinol 5MG Capsules and the insurance company would like to know if you would like to prescribe any of the following alternatives, Ativan, Compazine, Decadron, Haldol, Phenergan, Reglan or Zyprexa. Please advise. I will save the PA request if you want to stay with the Dronabinol. Thank You.  Fabiola Victoria on 7/16/2020 at 10:44 AM

## 2020-07-17 NOTE — PROGRESS NOTES
Infusion Nursing Note:  Babar Laboy presents today for fluids.    Patient seen by provider today: No   present during visit today: Not Applicable.    Note: N/A.    Intravenous Access:  Implanted Port.    Treatment Conditions:  Not Applicable.      Post Infusion Assessment:  Patient tolerated infusion without incident.  Blood return noted pre and post infusion.  Site patent and intact, free from redness, edema or discomfort.  No evidence of extravasations.  Access discontinued per protocol.       Discharge Plan:   Patient and/or family verbalized understanding of discharge instructions and all questions answered.  Patient discharged in stable condition accompanied by: self.  Departure Mode: Ambulatory.    Jean S. Hammann, RN

## 2020-07-20 NOTE — PHARMACY
Pharmacy: NEW INFUSION MEDICATION EDUCATION    Babar Laboy  [address] [place] [address]  [phone] (home)   70 year old male  PCP:Andrea Sherman    No Known Allergies      Summary of Education:   Met with patient today to review new medications to be administered in infusion including topotecan. Discussed cycle length, and home use of as needed medications including prochlorperazine, lorazepam. Discussed there were no interactions between topotecan and patient's home medications.     Materials Provided:   Drug information handouts printed from [employer] on: topotecan    Thank you for the consult.     Vinita Espino RP ....................  7/20/2020   1:21 PM

## 2020-07-24 NOTE — TELEPHONE ENCOUNTER
Spouse reports that she herself has had white spots on her throat and on antibiotic at this time, questioning what she should do if her  starts feeling ill.  Educated that to monitor for fever and symptoms of feeling well to seek medical attention.  States patient feels good at this time.  Is due to return for infusion Monday.  Olga Maier RN on 7/24/2020 at 10:39 AM

## 2020-07-27 NOTE — PROGRESS NOTES
Infusion Nursing Note:  Babar Laboy presents today for Cycle 2 of Topotecan.    Patient seen by provider today: No   present during visit today: Not Applicable.    Note: Patient and spouse report that the last week has gone well and appetite improved.    Intravenous Access:  Labs drawn without difficulty from port access.  Implanted Port accessed with brisk blood return.    Treatment Conditions:  Lab Results   Component Value Date    HGB 9.5 07/27/2020     Lab Results   Component Value Date    WBC 4.6 07/27/2020      Lab Results   Component Value Date    ANEU 3.0 07/27/2020     Lab Results   Component Value Date     07/27/2020      Results reviewed, labs MET treatment parameters, ok to proceed with treatment.  Pharmacist gave the spouse education on Anemia to assist with food choices high in iron.      Post Infusion Assessment:  Patient tolerated infusion without incident.  Blood return noted pre and post infusion.  Site patent and intact, free from redness, edema or discomfort.  Access discontinued per protocol.  Biologic Infusion Post Education: Call the triage nurse at your clinic or seek medical attention if you have chills and/or temperature greater than or equal to 100.5, uncontrolled nausea/vomiting, diarrhea, constipation, dizziness, shortness of breath, chest pain, heart palpitations, weakness or any other new or concerning symptoms, questions or concerns.  You cannot have any live virus vaccines prior to or during treatment or up to 6 months post infusion.  If you have an upcoming surgery, medical procedure or dental procedure during treatment, this should be discussed with your ordering physician and your surgeon/dentist.  If you are having any concerning symptom, if you are unsure if you should get your next infusion or wish to speak to a provider before your next infusion, please call your care coordinator or triage nurse at your clinic to notify them so we can adequately serve you.        Discharge Plan:   Patient and/or family verbalized understanding of discharge instructions and all questions answered.  Copy of AVS reviewed with patient and/or family.  Patient will return 7/30/2020 for next appointment for fluids. On 8/3/2020  Patient discharged in stable condition accompanied by: self and wife.  Departure Mode: Ambulatory.    Olga Maier RN

## 2020-08-03 NOTE — PROGRESS NOTES
Infusion Nursing Note:  Babar Laboy presents today for Topotecan cycle 1 day 15.    Patient seen by provider today: No   present during visit today: Not Applicable.    Note: neutropenic precautions discussed with patient and wife, voiced understanding..    Intravenous Access:  Labs drawn without difficulty.  Implanted Port.    Treatment Conditions:  Lab Results   Component Value Date    HGB 8.9 08/03/2020     Lab Results   Component Value Date    WBC 2.0 08/03/2020      Lab Results   Component Value Date    ANEU 0.9 08/03/2020     Lab Results   Component Value Date     08/03/2020      Results reviewed, labs did NOT meet treatment parameters: neutropenic.      Post Infusion Assessment:  Blood return noted pre and post access.  Site patent and intact, free from redness, edema or discomfort.  No evidence of extravasations.  Access discontinued per protocol.       Discharge Plan:   Discharge instructions reviewed with: Patient and Family.  Patient and/or family verbalized understanding of discharge instructions and all questions answered.  Copy of AVS reviewed with patient and/or family.  Patient will return 7 days for next appointment.  Patient discharged in stable condition accompanied by: wife.  Departure Mode: Ambulatory.    Jean S. Hammann, RN

## 2020-08-07 NOTE — PROGRESS NOTES
Infusion Nursing Note:  Babar Laboy presents today for fluids.    Patient seen by provider today: No   present during visit today: Not Applicable.    Note: N/A.    Intravenous Access:  Implanted Port accessed with blood return.    Treatment Conditions:  Not Applicable.      Post Infusion Assessment:  Patient tolerated infusion without incident.  Blood return noted pre and post infusion.  Site patent and intact, free from redness, edema or discomfort.  No evidence of extravasations.  Access discontinued per protocol.       Discharge Plan:   Patient and/or family verbalized understanding of discharge instructions and all questions answered.  Patient discharged in stable condition accompanied by: self and will return Monday for treatment.  Departure Mode: Ambulatory.    Olga Maier RN

## 2020-08-10 NOTE — PROGRESS NOTES
Infusion Nursing Note:  Babar Laboy presents today for topotecan day 22 cycle 1.    Patient seen by provider today: No   present during visit today: Not Applicable.    Note: N/A.    Intravenous Access:  Labs drawn without difficulty.  Implanted Port.    Treatment Conditions:  Lab Results   Component Value Date    HGB 9.5 08/10/2020     Lab Results   Component Value Date    WBC 2.9 08/10/2020      Lab Results   Component Value Date    ANEU 1.3 08/10/2020     Lab Results   Component Value Date     08/10/2020      Results reviewed, labs MET treatment parameters, ok to proceed with treatment.      Post Infusion Assessment:  Patient tolerated infusion without incident.  Blood return noted pre and post infusion.  Site patent and intact, free from redness, edema or discomfort.  No evidence of extravasations.  Access discontinued per protocol.       Discharge Plan:   Discharge instructions reviewed with: Patient.  Patient and/or family verbalized understanding of discharge instructions and all questions answered.  Copy of AVS reviewed with patient and/or family.  Patient will return 7 days for next appointment.  Patient discharged in stable condition accompanied by: wife.  Departure Mode: Ambulatory.    Jean S. Hammann, RN

## 2020-08-17 NOTE — PROGRESS NOTES
Patient was seen in infusion therapy for a status check. He is here for cycle 2 day 1 Topotecan for his lung cancer.  Patient states he is feeling well. He tolerated his first cycle of Topotecan well. He states he has some fatigue for the first day or two after chemo. He denies nausea, no diarrhea. Patient reports a red, itchy rash in his groin with some burning at times. He sates this has been present for a couple weeks. On exam, bilateral groin appears to have an erythematous, slightly raised rash. Patient does report itching and burning. Prescription for nystatin cream sent to pharmacy. Patient was advised to call us if symptoms persist or worsen with treatment. Labs today are within parameters for treatment. He will follow up with Dr Hamilton in 2 months with imaging.

## 2020-08-17 NOTE — PROGRESS NOTES
Infusion Nursing Note:  Babar Laboy presents today for cycle 2 day 1 of Topotecan.    Patient seen by provider today: Yes: Darlin Cisneros NP   present during visit today: Not Applicable.    Note: N/A.    Intravenous Access:  Labs drawn without difficulty from port access.  Implanted Port accessed with brisk blood return    Treatment Conditions:  Lab Results   Component Value Date    HGB 8.9 08/17/2020     Lab Results   Component Value Date    WBC 5.6 08/17/2020      Lab Results   Component Value Date    ANEU 3.6 08/17/2020     Lab Results   Component Value Date     08/17/2020      Lab Results   Component Value Date     08/17/2020                   Lab Results   Component Value Date    POTASSIUM 3.5 08/17/2020           Lab Results   Component Value Date    MAG 1.9 02/28/2020            Lab Results   Component Value Date    CR 0.65 08/17/2020                   Lab Results   Component Value Date    DONELL 9.2 08/17/2020                Lab Results   Component Value Date    BILITOTAL 0.4 08/17/2020           Lab Results   Component Value Date    ALBUMIN 3.7 08/17/2020                    Lab Results   Component Value Date    ALT 22 08/17/2020           Lab Results   Component Value Date    AST 15 08/17/2020       Results reviewed, labs MET treatment parameters, ok to proceed with treatment.      Post Infusion Assessment:  Patient tolerated infusion without incident.  Blood return noted pre and post infusion.  Site patent and intact, free from redness, edema or discomfort.  No evidence of extravasations.  Access discontinued per protocol.  Biologic Infusion Post Education: Call the triage nurse at your clinic or seek medical attention if you have chills and/or temperature greater than or equal to 100.5, uncontrolled nausea/vomiting, diarrhea, constipation, dizziness, shortness of breath, chest pain, heart palpitations, weakness or any other new or concerning symptoms, questions or concerns.  You  cannot have any live virus vaccines prior to or during treatment or up to 6 months post infusion.  If you have an upcoming surgery, medical procedure or dental procedure during treatment, this should be discussed with your ordering physician and your surgeon/dentist.  If you are having any concerning symptom, if you are unsure if you should get your next infusion or wish to speak to a provider before your next infusion, please call your care coordinator or triage nurse at your clinic to notify them so we can adequately serve you.       Discharge Plan:   Patient and/or family verbalized understanding of discharge instructions and all questions answered.  AVS to patient via [employer].  Patient will return 8/24 for next appointment.   Patient discharged in stable condition accompanied by: self and wife.  Departure Mode: Ambulatory.    Olga Maier RN

## 2020-08-17 NOTE — NURSING NOTE
Patient is being seen by the provider in infusion where all required infusion department rooming assessments, screenings, and vital signs were done by infusion RN.   Zaria Man RN...........8/17/2020 11:01 AM

## 2020-08-18 NOTE — TELEPHONE ENCOUNTER
[id number] ([facility] of [facility] sent Rx request for the following:      Requested Prescriptions   Pending Prescriptions Disp Refills   atorvastatin (LIPITOR) 10 MG tablet 90 tablet 0    Sig: Take 1 tablet (10 mg) by mouth daily   Last Prescription Date:   5/19/20  Last Fill Qty/Refills:         90, R-0    Last Office Visit:              7/15/20  Future Office visit:           None  Routing refill request to provider for review/approval because:   Statins Protocol Failed - 8/18/2020  9:19 AM       Failed - LDL on file in past 12 months     Unable to complete prescription refill per RN Medication Refill Policy. Cristiane Tolentino RN .............. 8/18/2020  9:22 AM

## 2020-08-19 NOTE — TELEPHONE ENCOUNTER
"[facility] sent Rx request for the following:   omeprazole (PRILOSEC) 20 MG DR capsule  Sig:Take 1 capsule (20 mg) by mouth daily    Last Prescription Date:   07/15/2020  Last Fill Qty/Refills:         30, R-1    Last Office Visit:              07/15/2020 ([facility])  Future Office visit:           None noted    Per encounter note on 07/15/2020  \"Start omeprazole 20 mg daily to protect stomach and see if this helps appetite. Take for 1 month and stop.\" Andrea Sherman MD  Will route for review and consideration.   Kelsey Hameed RN ....................  8/19/2020   7:50 AM      "

## 2020-08-24 NOTE — PROGRESS NOTES
Infusion Nursing Note:  Babar Laboy presents today for topotecan .    Patient seen by provider today: No   present during visit today: Not Applicable.    Note: N/A.    Intravenous Access:  Labs drawn without difficulty.  Implanted Port.    Treatment Conditions:  Lab Results   Component Value Date    HGB 9.4 08/24/2020     Lab Results   Component Value Date    WBC 5.0 08/24/2020      Lab Results   Component Value Date    ANEU 3.3 08/24/2020     Lab Results   Component Value Date     08/24/2020      Results reviewed, labs MET treatment parameters, ok to proceed with treatment.      Post Infusion Assessment:  Patient tolerated infusion without incident.  Blood return noted pre and post infusion.  Site patent and intact, free from redness, edema or discomfort.  No evidence of extravasations.  Access discontinued per protocol.       Discharge Plan:   Patient and/or family verbalized understanding of discharge instructions and all questions answered.  Copy of AVS reviewed with patient and/or family.  Patient will return 14 days for next appointment.  Patient discharged in stable condition accompanied by: wife.  Departure Mode: Ambulatory.    Jean S. Hammann, RN

## 2020-08-31 NOTE — PROGRESS NOTES
Infusion Nursing Note:  Babar Laboy presents today for cycle 2 day 15 of topotecan.    Patient seen by provider today: No   present during visit today: Not Applicable.    Note: N/A.    Intravenous Access:  Labs drawn without difficulty.  Implanted Port accessed with brisk blood return for ordered labs.    Treatment Conditions:  Lab Results   Component Value Date    HGB 8.2 08/31/2020     Lab Results   Component Value Date    WBC 3.0 08/31/2020      Lab Results   Component Value Date    ANEU 1.6 08/31/2020     Lab Results   Component Value Date     08/31/2020      Lab Results   Component Value Date     08/17/2020                   Lab Results   Component Value Date    POTASSIUM 3.5 08/17/2020           Lab Results   Component Value Date    MAG 1.9 02/28/2020            Lab Results   Component Value Date    CR 0.65 08/17/2020                   Lab Results   Component Value Date    DONELL 9.2 08/17/2020                Lab Results   Component Value Date    BILITOTAL 0.4 08/17/2020           Lab Results   Component Value Date    ALBUMIN 3.7 08/17/2020                    Lab Results   Component Value Date    ALT 22 08/17/2020           Lab Results   Component Value Date    AST 15 08/17/2020       Results reviewed, labs MET treatment parameters, ok to proceed with treatment.      Post Infusion Assessment:  Patient tolerated infusion without incident.  Blood return noted pre and post infusion.  Site patent and intact, free from redness, edema or discomfort.  No evidence of extravasations.  Access discontinued per protocol.  Biologic Infusion Post Education: Call the triage nurse at your clinic or seek medical attention if you have chills and/or temperature greater than or equal to 100.5, uncontrolled nausea/vomiting, diarrhea, constipation, dizziness, shortness of breath, chest pain, heart palpitations, weakness or any other new or concerning symptoms, questions or concerns.  You cannot have any live  virus vaccines prior to or during treatment or up to 6 months post infusion.  If you have an upcoming surgery, medical procedure or dental procedure during treatment, this should be discussed with your ordering physician and your surgeon/dentist.  If you are having any concerning symptom, if you are unsure if you should get your next infusion or wish to speak to a provider before your next infusion, please call your care coordinator or triage nurse at your clinic to notify them so we can adequately serve you.       Discharge Plan:   Copy of AVS reviewed with patient and/or family.  Patient will return 9/8 for next appointment.  Patient discharged in stable condition accompanied by: self.  Departure Mode: Ambulatory.    Olga Maier RN

## 2020-09-08 NOTE — PROGRESS NOTES
Infusion Nursing Note:  Babar Laboy presents today for topotecan.    Patient seen by provider today: No   present during visit today: Not Applicable.    Note: N/A.    Intravenous Access:  Labs drawn without difficulty.  Implanted Port.    Treatment Conditions:  Lab Results   Component Value Date    HGB 8.3 09/08/2020     Lab Results   Component Value Date    WBC 3.7 09/08/2020      Lab Results   Component Value Date    ANEU 2.0 09/08/2020     Lab Results   Component Value Date     09/08/2020      Results reviewed, labs MET treatment parameters, ok to proceed with treatment.      Post Infusion Assessment:  Patient tolerated infusion without incident.  Blood return noted pre and post infusion.  Site patent and intact, free from redness, edema or discomfort.  No evidence of extravasations.  Access discontinued per protocol.       Discharge Plan:   Discharge instructions reviewed with: Patient and Family.  Patient and/or family verbalized understanding of discharge instructions and all questions answered.  Copy of AVS reviewed with patient and/or family.  Patient will return for next appointment.Patient discharged in stable condition accompanied by: self.  Departure Mode: Ambulatory.    Helga Knapp RN

## 2020-09-10 NOTE — TELEPHONE ENCOUNTER
Kenalog      Last Written Prescription Date:  8/10/2020  Last Fill Quantity: 30g,   # refills: 1  Last Office Visit: 8/17/2020  Future Office visit:    Next 5 appointments (look out 90 days)    Oct 21, 2020  9:30 AM CDT  Return Visit with Jeff Hamilton MD  [facility] ([facility]) [address]  [place] [address]  [phone]

## 2020-09-10 NOTE — TELEPHONE ENCOUNTER
Mycostatin       Last Written Prescription Date:  8/26/2020  Last Fill Quantity: 30g,   # refills: 1  Last Office Visit: 6/11/2020  Future Office visit:    Next 5 appointments (look out 90 days)    Oct 21, 2020  9:30 AM CDT  Return Visit with Jeff Hamilton MD  [facility] ([facility]) [address]  [place] [address]  [phone]

## 2020-09-13 NOTE — TELEPHONE ENCOUNTER
"Caller was the wife, called for  with numbness of mostly the left leg, occasionally in the right leg.  Caller states that patient fell yesterday and when he was been helped to get off the floor-patient could not feel his legs and could not move them , called said\"they felt like wood\".  Caller states that patient has cancer of the Lung (diagnosed in 2019) and he is on chemotherapy (last chemotherapy was on 9/8), next treatment will be in the morning.  Caller states not sure if chemotherapy treatment is causing the numbness.  Per protocol/care advice, patient suppose to be seen within 24 hours by primary care provider.  Caller states she will give the oncology nurse a call today to find out if patient's numbness of legs has anything to do with chemotherapy treatment.  Maru Burgos RN.  COVID 19 Nurse Triage Plan/Patient Instructions    Please be aware that novel coronavirus (COVID-19) may be circulating in the community. If you develop symptoms such as fever, cough, or SOB or if you have concerns about the presence of another infection including coronavirus (COVID-19), please contact your health care provider or visit [email or web address].     Disposition/Instructions    In-Person Visit with provider recommended. Reference Visit Selection Guide.    Thank you for taking steps to prevent the spread of this virus.  o Limit your contact with others.  o Wear a simple mask to cover your cough.  o Wash your hands well and often.    Resources    [facility]: About COVID-19: [email or web address]    CDC: What to Do If You're Sick: www.cdc.gov/coronavirus/2019-ncov/about/steps-when-sick.html    CDC: Ending Home Isolation: www.cdc.gov/coronavirus/2019-ncov/hcp/disposition-in-home-patients.html     CDC: Caring for Someone: www.cdc.gov/coronavirus/2019-ncov/if-you-are-sick/care-for-someone.html     [facility]: Interim Guidance for Hospital Discharge to Home: " www.health.[facility].[place]/diseases/coronavirus/hcp/hospdischarge.pdf    [facility] clinical trials (COVID-19 research studies): [email or web address]-trials     Below are the COVID-19 hotlines at the [facility] ([facility]). Interpreters are available.   o For health questions: Call 698-868-2539 or 1-896.257.8057 (7 a.m. to 7 p.m.)  o For questions about schools and childcare: Call 468-329-9063 or 1-952.192.5559 (7 a.m. to 7 p.m.)                     Additional Information    Negative: Passed out (i.e., lost consciousness, collapsed and was not responding)    Negative: Shock suspected (e.g., cold/pale/clammy skin, too weak to stand, low BP, rapid pulse)    Negative: Sounds like a life-threatening emergency to the triager    Negative: Major injury to the back (e.g., MVA, fall > 10 feet or 3 meters, penetrating injury, etc.)    Negative: Followed a tailbone injury    Negative: [1] Pain in the upper back over the ribs (rib cage) AND [2] radiates (travels, goes) into chest    Negative: [1] Pain in the upper back over the ribs (rib cage) AND [2] worsened by coughing (or clearly increases with breathing)    Negative: Back pain during pregnancy    Negative: Pain mainly in flank (i.e., in the side, over the lower ribs or just below the ribs)    Negative: [1] SEVERE back pain (e.g., excruciating) AND [2] sudden onset AND [3] age > 60    Negative: [1] Unable to urinate (or only a few drops) > 4 hours AND [2] bladder feels very full (e.g., palpable bladder or strong urge to urinate)    Negative: [1] Loss of bladder or bowel control (urine or bowel incontinence; wetting self, leaking stool) AND [2] new onset    Negative: Numbness in groin or rectal area (i.e., loss of sensation)    Negative: [1] SEVERE abdominal pain AND [2] present > 1 hour    Negative: [1] Abdominal pain AND [2] age > 60    Negative: Weakness of a leg or foot (e.g., unable to bear weight, dragging foot)    Negative:  Unable to walk    Negative: Patient sounds very sick or weak to the triager    Negative: [1] SEVERE back pain (e.g., excruciating, unable to do any normal activities) AND [2] not improved 2 hours after pain medicine    Negative: [1] Pain radiates into the thigh or further down the leg AND [2] both legs    Negative: [1] Fever > 100.0 F (37.8 C) AND [2] flank pain (i.e., in side, below ribs and above hip)    Negative: [1] Pain or burning with passing urine (urination) AND [2] flank pain (i.e., in side, below ribs and above hip)    Negative: Numbness in a leg or foot (i.e., loss of sensation)    Negative: [1] Numbness in an arm or hand (i.e., loss of sensation) AND [2] upper back pain    High-risk adult (e.g., history of cancer, HIV, or IV drug abuse)    Protocols used: BACK PAIN-A-

## 2020-09-14 NOTE — ED TRIAGE NOTES
Pt presents to ED with wife for c/o bilateral leg weakness. Pt has hx of lung cancer with mets, had chemotherapy last Tuesday. Since, pt has become more weak in his legs, states he fell on Saturday but denies trauma to back or legs. Pt states tonight, he could barely walk and also had difficulty urinating. Pt denies pain, c/o numbness/tingling to bilateral LE, more so on the left. Denies HA, denies N/V. Denies SOB, fevers, cough. Pt's wife concerned that things are declining, didn't know what else to do.  Brittany Goetz, RN

## 2020-09-14 NOTE — ED NOTES
Pt up to commode, very unsteady on feet. Pt denies pain, still c/o numbness/tingling down bilateral legs.   Brittany Goetz RN

## 2020-09-14 NOTE — ED PROVIDER NOTES
History     Chief Complaint   Patient presents with     Extremity Weakness     HPI  Babar Laboy is a 70 year old male with history of metastatic small cell lung cancer, paroxysmal A. fib, mixed hyperlipidemia who presents the ER with the following history.  He got chemo on Tuesday(Hycamtin) and on Thursday he noticed that he felt like his legs were achy.  He noted that he fell on Saturday- but denies any obvious injury.  He noted that they felt weak and he felt better after he got up but after he is lying down for well they would feel weak again.  On Saturday he started to notice that his left foot seemed to be dragging at times.  He came in today because he has been having more difficulty with getting up and moving around.  He also notes some decreased urination in amount however he is able to still urinate on his own.  Has not had any bowel issues.  He denies any saddle anesthesia.  He does complain of some vague heaviness/numbness in both lower legs.    Allergies:  No Known Allergies    Problem List:    Patient Active Problem List    Diagnosis Date Noted     Malignant neoplasm of hilus of left lung (H) 12/20/2019     Priority: Medium     NESSA (obstructive sleep apnea) 08/08/2019     Priority: Medium     Paroxysmal atrial fibrillation (H) 08/07/2019     Priority: Medium     New onset a-fib 7/1/2019 08/07/2019     Priority: Medium     Abnormal stress test on 7/18/2019 08/07/2019     Priority: Medium     Tobacco abuse 08/07/2019     Priority: Medium     Tobacco abuse counseling 08/07/2019     Priority: Medium     Ascending aortic aneurysm (H) 08/07/2019     Priority: Medium     Asymmetric septal hypertrophy (H) 08/07/2019     Priority: Medium     On continuous oral anticoagulation 08/07/2019     Priority: Medium     Benign essential hypertension 05/15/2018     Priority: Medium     H/O adenomatous polyp of colon 04/25/2016     Priority: Medium     Mixed hyperlipidemia 01/28/2016     Priority: Medium     Acne  rosacea 2011     Priority: Medium        Past Medical History:    Past Medical History:   Diagnosis Date     Atrial fibrillation (H)      Encounter for general adult medical examination without abnormal findings      Essential (primary) hypertension      Other microscopic hematuria      Other problems related to lifestyle      Residual hemorrhoidal skin tags      Rosacea      Tobacco use      Unilateral inguinal hernia without obstruction or gangrene        Past Surgical History:    Past Surgical History:   Procedure Laterality Date     COLONOSCOPY      ,F/U      COLONOSCOPY  2016,F/U  tubular adenomas     ESOPHAGOSCOPY, GASTROSCOPY, DUODENOSCOPY (EGD), COMBINED N/A 3/2/2020    Procedure: ESOPHAGOGASTRODUODENOSCOPY, WITH Possible  DILATION;  Surgeon: Nitin Quiros MD;  Location: [facility] OR     INSERT PORT VASCULAR ACCESS Right 2019    Procedure: INSERTION, VASCULAR ACCESS PORT;  Surgeon: Nitin Quiros MD;  Location: [facility] OR     OTHER SURGICAL HISTORY      [id number],OTHER     OTHER SURGICAL HISTORY      [id number],OTHER     OTHER SURGICAL HISTORY      15,,HERNIA REPAIR,Right,RIH with mesh     RELEASE CARPAL TUNNEL      ,right       Family History:    Family History   Problem Relation Age of Onset     Hypertension Father      Heart Disease Father      Myocardial Infarction Father      Cerebrovascular Disease Father      Hypertension Other         Hypertension,Multiple family members     Pancreatic Cancer Brother      Colon Cancer No family hx of      Prostate Cancer No family hx of        Social History:  Marital Status:   [2]  Social History     Tobacco Use     Smoking status: Former Smoker     Packs/day: 0.50     Years: 42.00     Pack years: 21.00     Types: Cigarettes     Last attempt to quit: 12/3/2019     Years since quittin.7     Smokeless tobacco: Never Used   Substance Use Topics     Alcohol use: Yes     Comment: 1 per month -beer     Drug use: No     "    Medications:    acetaminophen (TYLENOL) 500 MG tablet  apixaban ANTICOAGULANT (ELIQUIS ANTICOAGULANT) 5 MG tablet  atorvastatin (LIPITOR) 10 MG tablet  cetirizine (ZYRTEC) 10 MG tablet  dronabinol (MARINOL) 5 MG capsule  gabapentin (NEURONTIN) 300 MG capsule  HEMP OIL OR EXTRACT OR OTHER CBD CANNABINOID, NOT MEDICAL CANNABIS,  lidocaine-prilocaine (EMLA) 2.5-2.5 % external cream  loperamide (IMODIUM) 2 MG capsule  LORazepam (ATIVAN) 0.5 MG tablet  losartan (COZAAR) 25 MG tablet  metoprolol succinate ER (TOPROL-XL) 50 MG 24 hr tablet  NONFORMULARY  nystatin (MYCOSTATIN) 037975 UNIT/GM external cream  omeprazole (PRILOSEC) 20 MG DR capsule  oxyCODONE 5 MG PO tablet  Phenylephrine-APAP-guaiFENesin (MUCINEX FAST-MAX COLD & SINUS) -400 MG/20ML LIQD  potassium chloride ER (K-TAB/KLOR-CON) 10 MEQ CR tablet  prochlorperazine (COMPAZINE) 10 MG tablet  triamcinolone (KENALOG) 0.1 % external cream  vitamin B6 (PYRIDOXINE) 100 MG tablet  vitamin C (ASCORBIC ACID) 500 MG tablet          Review of Systems   Constitutional: Positive for activity change and fatigue. Negative for appetite change and chills.   HENT: Negative.    Respiratory: Negative for shortness of breath.    Cardiovascular: Negative for chest pain.   Gastrointestinal: Positive for abdominal pain.   Genitourinary: Positive for difficulty urinating.   Musculoskeletal: Positive for gait problem.   Neurological: Positive for numbness.        Lower legs       Physical Exam   BP: (!) 156/101  Pulse: 70  Temp: 96.8  F (36  C)  Resp: 16  Height: 175.3 cm (5' 9\")  Weight: 76.7 kg (169 lb)  SpO2: 99 %      Physical Exam  Vitals signs and nursing note reviewed.   Constitutional:       General: He is not in acute distress.     Comments: Appears pale   HENT:      Head: Normocephalic and atraumatic.      Mouth/Throat:      Mouth: Mucous membranes are moist.   Eyes:      Pupils: Pupils are equal, round, and reactive to light.   Neck:      Musculoskeletal: Normal range " of motion.   Cardiovascular:      Rate and Rhythm: Normal rate and regular rhythm.      Pulses: Normal pulses.      Heart sounds: Normal heart sounds.   Pulmonary:      Effort: Pulmonary effort is normal.      Breath sounds: Normal breath sounds. No rhonchi.   Chest:      Chest wall: No tenderness.   Abdominal:      General: Abdomen is flat. Bowel sounds are normal.      Tenderness: There is abdominal tenderness.      Comments: LLQ tenderness   Musculoskeletal:         General: No tenderness or deformity.      Right lower leg: No edema.      Left lower leg: No edema.   Skin:     General: Skin is warm and dry.      Capillary Refill: Capillary refill takes less than 2 seconds.   Neurological:      General: No focal deficit present.      Mental Status: He is alert and oriented to person, place, and time.      Comments: Good flexor and extension at both ankles. Sensation intact. Sensation of weakness and heaviness per patient       ED Course   Patient seen and examined. Labs ordered. CT Abdomen/Pelvis and Ct Lumbar Spine with contrast ordered.     See results of CT Lumbar spine, CT Abd/pelvis.   Discussed patient with Dr. Marte, neurosurgeon.  Advised admission by the hospitalist for further work-up.    Discussed patient with Dr. Alexandre who accepted patient for admission.  Discussed with patient and his wife.  They understand the reason for transfer and are agreeable.     Procedures      Results for orders placed or performed during the hospital encounter of 09/14/20 (from the past 24 hour(s))   CBC with platelets differential   Result Value Ref Range    WBC 3.4 (L) 4.0 - 11.0 10e9/L    RBC Count 2.62 (L) 4.4 - 5.9 10e12/L    Hemoglobin 8.1 (L) 13.3 - 17.7 g/dL    Hematocrit 24.2 (L) 40.0 - 53.0 %    MCV 92 78 - 100 fl    MCH 30.9 26.5 - 33.0 pg    MCHC 33.5 31.5 - 36.5 g/dL    RDW 18.6 (H) 10.0 - 15.0 %    Platelet Count 315 150 - 450 10e9/L    Diff Method Automated Method     % Neutrophils 58.8 %    % Lymphocytes 20.9 %     % Monocytes 13.7 %    % Eosinophils 4.8 %    % Basophils 0.6 %    % Immature Granulocytes 1.2 %    Absolute Neutrophil 2.0 1.6 - 8.3 10e9/L    Absolute Lymphocytes 0.7 (L) 0.8 - 5.3 10e9/L    Absolute Monocytes 0.5 0.0 - 1.3 10e9/L    Absolute Eosinophils 0.2 0.0 - 0.7 10e9/L    Absolute Basophils 0.0 0.0 - 0.2 10e9/L    Abs Immature Granulocytes 0.0 0 - 0.4 10e9/L   Comprehensive metabolic panel   Result Value Ref Range    Sodium 134 134 - 144 mmol/L    Potassium 3.7 3.5 - 5.1 mmol/L    Chloride 103 98 - 107 mmol/L    Carbon Dioxide 23 21 - 31 mmol/L    Anion Gap 8 3 - 14 mmol/L    Glucose 125 (H) 70 - 105 mg/dL    Urea Nitrogen 15 7 - 25 mg/dL    Creatinine 0.65 (L) 0.70 - 1.30 mg/dL    GFR Estimate >90 >60 mL/min/[1.73_m2]    GFR Estimate If Black >90 >60 mL/min/[1.73_m2]    Calcium 8.9 8.6 - 10.3 mg/dL    Bilirubin Total 0.4 0.3 - 1.0 mg/dL    Albumin 3.7 3.5 - 5.7 g/dL    Protein Total 6.4 6.4 - 8.9 g/dL    Alkaline Phosphatase 96 34 - 104 U/L    ALT 15 7 - 52 U/L    AST 17 13 - 39 U/L   CT Abdomen Pelvis w Contrast    Narrative    PROCEDURE INFORMATION:   Exam: CT Abdomen And Pelvis With Contrast   Exam date and time: 9/14/2020 3:18 AM   Age: 70 years old   Clinical indication: Other: Colon cancer patient with foot drop-bilat/l<r;   Additional info: Colon cancer, monitor, stage iv     TECHNIQUE:   Imaging protocol: Computed tomography of the abdomen and pelvis with   intravenous contrast.   Radiation optimization: All CT scans at this facility use at least one of these   dose optimization techniques: automated exposure control; mA and/or kV   adjustment per patient size (includes targeted exams where dose is matched to   clinical indication); or iterative reconstruction.   Contrast material: OMNIPAQUE 350; Contrast volume: 100 ml; Contrast route:   INTRAVENOUS (IV);      COMPARISON:   CT ABDOMEN PELVIS W CONTRAST 7/7/2020 9:15 AM     FINDINGS:   Lungs: Lung bases are clear.     Liver: 2 cm cyst of the left  lobe of the liver again noted. No liver mass   identified.  Gallbladder and bile ducts: Normal. No calcified stones. No ductal dilation.   Pancreas: Normal. No ductal dilation.   Spleen: Normal. No splenomegaly.   Adrenals: New 3 cm mass of each adrenal gland likely represents metastasis.   Kidneys and ureters: Possible small, 1 cm cyst of the right kidney. If   indicated clinically recommend correlation with renal ultrasound.  No   hydronephrosis of either kidney.   Stomach and bowel: Significant amount of stool is seen throughout the colon.   Normal bowel gas pattern.  No dilated loops of bowel.  No inflammation of the   bowel.   Appendix: No evidence of appendicitis.     Intraperitoneal space: Unremarkable. No free air. No significant fluid   collection.   Vasculature: The vasculature demonstrates diffuse severe atherosclerotic   calcification.   Lymph nodes: Unremarkable. No enlarged lymph nodes.   Bladder: Unremarkable as visualized.   Reproductive: Unremarkable as visualized.   Bones/joints: Probable bone island within the sacrum again noted. Cannot   exclude sclerotic metastasis.   Soft tissues: Unremarkable.   Report of prior examination is not available. Prior reports are usually   provided by the facility.       Impression    IMPRESSION:   New adrenal gland masses likely represent metastasis.    No ascites or adenopathy identified.    Probable cyst within the left lobe of the liver again noted. No hepatic   metastases identified.    Clinical history indicates bilateral footdrop. If cord or nerve root   compression is a concern clinically correlation with lumbosacral spine MRI is   mandatory.      THIS DOCUMENT HAS BEEN ELECTRONICALLY SIGNED BY JOSÉ MIGUEL AMADOR MD   CT Lumbar Spine w/o Contrast    Narrative    PROCEDURE INFORMATION:   Exam: CT Lumbar Spine Without Contrast   Exam date and time: 9/14/2020 3:20 AM   Age: 70 years old   Clinical indication: Weakness; Patient HX: Colon cancer now with foot  drop.   Left worse than right. Has bone mets known in tspine; Additional info: Back   pain, cauda equina syndrome suspected     TECHNIQUE:   Imaging protocol: Computed tomography images of the lumbar spine without   contrast.   Radiation optimization: All CT scans at this facility use at least one of these   dose optimization techniques: automated exposure control; mA and/or kV   adjustment per patient size (includes targeted exams where dose is matched to   clinical indication); or iterative reconstruction.     COMPARISON:   MR LUMBAR SPINE W O \T\ W CONTRAST 12/31/2019 12:37 PM     FINDINGS:   Vertebrae: No acute fracture.   Discs/Spinal canal/Neural foramina: No stenosis of the bony spinal canal due   identified. Mild degenerative change of the intervertebral discs. No  neural   foraminal narrowing identified. Diffuse mild facet arthropathy noted. Mild   deformity of the thecal sac at the L3-L4, L4-L5 and L5-S1 levels secondary to   the degenerative changes.  Other bones/joints: Bone metastasis are seen on earlier MRI. Metastatic disease   is suggested within the sacrum and iliac bones. Heterogeneous bone density   could represent progression of metastasis.     Soft tissues: Unremarkable.       Impression    IMPRESSION:   No fracture or stenosis of the bony spinal canal.    However, if cord or nerve root compression is a concern clinically correlation   with emergent repeat MRI is mandatory. Also recommend neuro surgical   consultation.    THIS DOCUMENT HAS BEEN ELECTRONICALLY SIGNED BY JOSÉ MIGUEL AMADOR MD       Medications   dextrose 5% and 0.9% NaCl infusion ( Intravenous New Bag 9/14/20 0369)   iohexol (OMNIPAQUE) 350 mg/mL solution 100 mL (100 mLs Intravenous Given 9/14/20 0300)       Assessments & Plan (with Medical Decision Making)     I have reviewed the nursing notes.    I have reviewed the findings, diagnosis, plan and need for follow up with the patient.      New Prescriptions    No medications on  file       Final diagnoses:   Neuropathy   Foot drop, left foot       9/14/2020   [facility]ur[facility] MD  09/14/20 0613

## 2020-09-14 NOTE — TELEPHONE ENCOUNTER
Julianne calls and states that Lu has had leg pain and numbness. States he is not walking well and did fall last night. Legs/feet feel like they are asleep. Does take Gabapentin 300 mg 2 caps three time daily. Julianne would like a call with advise.  Zaria Man RN...........9/14/2020 8:34 AM

## 2020-09-14 NOTE — ED NOTES
Images from 9/14/2020 and CT CAP 7/7/2020 sent to [facility] per Dr Owens. Rhona Tam on 9/14/2020 at 6:04 AM

## 2020-09-16 NOTE — TELEPHONE ENCOUNTER
Call returned to patient's wife, Julianne.  Discussion of Dr. Hamilton speaking with [facility] team, has occurred.  No further concerns or questions at this time.  Kelsey Hameed RN ....................  9/16/2020   10:30 AM

## 2020-09-16 NOTE — TELEPHONE ENCOUNTER
Julianne would like a call regarding Lu's [facility] admit and if Dr Hamilton has been in contact with the Dr's there, and his thoughts about what is happening.

## 2020-09-18 NOTE — TELEPHONE ENCOUNTER
Patient's wife contacted regarding call. Wife relayed patient is currently hospitalized at [facility], patient has decided to seek hospice care with recent news from [facility] providers that life expectancy would be 2-3 months. Patient wishes to stop treatments. Discussion of hospice care and work flow for intake process, care on weekend if concern arises. Relayed to patient's wife that hospice is 24/7 agency for support in care, call with any further concerns or questions to oncology at [facility]. Wife verbalized understanding and will call with updates as needed.  Kelsey Hameed RN ....................  9/18/2020   9:32 AM

## 2020-09-22 NOTE — TELEPHONE ENCOUNTER
Spoke with patients wife. Patient is at home now with hospice care. She states patient is doing well. He is comfortable. They have met with hospice. Wife states she does have a good support system and they are planning for the future as patient progresses. Explained to wife that we are still here if they need anything from us.

## 2020-09-30 NOTE — TELEPHONE ENCOUNTER
[facility] ([facility] of [facility] sent Rx request for the following:      Requested Prescriptions   Pending Prescriptions Disp Refills   nystatin (MYCOSTATIN) 454167 UNIT/GM external cream 30 g 1    Sig: Apply topically 2 times daily   Last Prescription Date:   8/26/20  Last Fill Qty/Refills:         30g, R-1       triamcinolone (KENALOG) 0.1 % external cream 30 g 1    Sig: Apply topically 2 times daily   Last Prescription Date:   8/10/20  Last Fill Qty/Refills:         30g, R-1      Last Office Visit:              7/15/20  Future Office visit:           None    Unable to complete prescription refill per RN Medication Refill Policy. Cristiane Tolentino RN .............. 9/30/2020  9:15 AM

## 2020-10-12 NOTE — TELEPHONE ENCOUNTER
Spouse is wondering is pt should have a shingles vaccination since he is now with hospice.  Please call Julianne at [phone]

## 2020-10-12 NOTE — TELEPHONE ENCOUNTER
After verifying pts name and date of birth with pt spouse, Julianne was notified of message below.  Alyssa Quiros LPN

## 2025-01-27 NOTE — TELEPHONE ENCOUNTER
Please call Julianne regarding using stool softners.    
Wife states that no bowel for approx 5 days.  Given Dulcolax 30 minutes ago.  Would like to avoid changing to diarrhea.  states that he also took Miralax this am. Per Darlin Sexton NP can use Miralax daily, unless stools become loose. Advised to increase fluids.  Zaria Man RN...........1/3/2020 11:23 AM    
[FreeTextEntry2] : right knee pain

## (undated) DEVICE — DRAPE C-ARM PACK 9"

## (undated) DEVICE — SOL WATER 1500ML

## (undated) DEVICE — SU MONOCRYL 4-0 PS-2 27" UND Y426H

## (undated) DEVICE — BLADE KNIFE SURG 11 371111

## (undated) DEVICE — SYR 50ML LL W/O NDL 309653

## (undated) DEVICE — PROBE COVER SAFERSONIC LONG 50/BX B204538

## (undated) DEVICE — COVER LIGHT HANDLE LT-F02

## (undated) DEVICE — SUCTION MANIFOLD NEPTUNE 2 SYS 4 PORT 0702-020-000

## (undated) DEVICE — ESU GROUND PAD ADULT W/CORD E7507

## (undated) DEVICE — TUBING SUCTION 10'X3/16" N510

## (undated) DEVICE — Device

## (undated) DEVICE — GLOVE PROTEXIS POWDER FREE SMT 7.5  2D72PT75X

## (undated) DEVICE — PACK MAJOR LAPAROTOMY LF SBA15MLFCA

## (undated) DEVICE — PREP CHLORAPREP 26ML TINTED ORANGE  260815

## (undated) DEVICE — ENDO BITE BLOCK 60 MAXI LF 00712804

## (undated) DEVICE — ADH SKIN CLOSURE PREMIERPRO EXOFIN 1.0ML 3470

## (undated) DEVICE — GLOVE BIOGEL INDICATOR 7.5 LF 41675

## (undated) DEVICE — SYR 10ML LL W/O NDL

## (undated) DEVICE — ENDO FORCEP ENDOJAW BIOPSY 2.8MMX230CM FB-220U

## (undated) DEVICE — SU PROLENE 2-0 CT-2 30" 8411H

## (undated) DEVICE — ENDO KIT COMPLIANCE DYKENDOCMPLY

## (undated) RX ORDER — FENTANYL CITRATE 50 UG/ML
INJECTION, SOLUTION INTRAMUSCULAR; INTRAVENOUS
Status: DISPENSED
Start: 2019-01-01

## (undated) RX ORDER — BUPIVACAINE HYDROCHLORIDE 5 MG/ML
INJECTION, SOLUTION EPIDURAL; INTRACAUDAL
Status: DISPENSED
Start: 2019-01-01

## (undated) RX ORDER — SODIUM CHLORIDE, SODIUM LACTATE, POTASSIUM CHLORIDE, CALCIUM CHLORIDE 600; 310; 30; 20 MG/100ML; MG/100ML; MG/100ML; MG/100ML
INJECTION, SOLUTION INTRAVENOUS
Status: DISPENSED
Start: 2020-01-01

## (undated) RX ORDER — HEPARIN SODIUM (PORCINE) LOCK FLUSH IV SOLN 100 UNIT/ML 100 UNIT/ML
SOLUTION INTRAVENOUS
Status: DISPENSED
Start: 2020-01-01

## (undated) RX ORDER — PROPOFOL 10 MG/ML
INJECTION, EMULSION INTRAVENOUS
Status: DISPENSED
Start: 2019-01-01

## (undated) RX ORDER — OXYCODONE HYDROCHLORIDE 5 MG/1
TABLET ORAL
Status: DISPENSED
Start: 2020-01-01

## (undated) RX ORDER — CEFAZOLIN SODIUM 2 G/100ML
INJECTION, SOLUTION INTRAVENOUS
Status: DISPENSED
Start: 2019-01-01

## (undated) RX ORDER — PANTOPRAZOLE SODIUM 40 MG/10ML
INJECTION, POWDER, LYOPHILIZED, FOR SOLUTION INTRAVENOUS
Status: DISPENSED
Start: 2020-01-01

## (undated) RX ORDER — HEPARIN SODIUM (PORCINE) LOCK FLUSH IV SOLN 100 UNIT/ML 100 UNIT/ML
SOLUTION INTRAVENOUS
Status: DISPENSED
Start: 2019-01-01

## (undated) RX ORDER — SODIUM CHLORIDE 9 MG/ML
INJECTION, SOLUTION INTRAVENOUS
Status: DISPENSED
Start: 2019-01-01

## (undated) RX ORDER — REGADENOSON 0.08 MG/ML
INJECTION, SOLUTION INTRAVENOUS
Status: DISPENSED
Start: 2019-07-18

## (undated) RX ORDER — PROPOFOL 10 MG/ML
INJECTION, EMULSION INTRAVENOUS
Status: DISPENSED
Start: 2020-01-01